# Patient Record
Sex: FEMALE | Race: OTHER | Employment: PART TIME | URBAN - METROPOLITAN AREA
[De-identification: names, ages, dates, MRNs, and addresses within clinical notes are randomized per-mention and may not be internally consistent; named-entity substitution may affect disease eponyms.]

---

## 2012-06-21 LAB — COLONOSCOPY, EXTERNAL: NORMAL

## 2017-05-13 ENCOUNTER — HOSPITAL ENCOUNTER (OUTPATIENT)
Dept: MAMMOGRAPHY | Age: 62
Discharge: HOME | End: 2017-05-13

## 2017-05-13 ENCOUNTER — HOSPITAL ENCOUNTER (OUTPATIENT)
Dept: LAB | Age: 62
Discharge: HOME | End: 2017-05-13

## 2017-05-13 ENCOUNTER — OFFICE VISIT (OUTPATIENT)
Dept: FAMILY PLANNING/WOMEN'S HEALTH CLINIC | Age: 62
End: 2017-05-13

## 2017-05-13 VITALS — DIASTOLIC BLOOD PRESSURE: 93 MMHG | SYSTOLIC BLOOD PRESSURE: 142 MMHG

## 2017-05-13 DIAGNOSIS — Z01.419 WELL WOMAN EXAM WITH ROUTINE GYNECOLOGICAL EXAM: Primary | ICD-10-CM

## 2017-05-13 DIAGNOSIS — Z01.419 WELL WOMAN EXAM WITH ROUTINE GYNECOLOGICAL EXAM: ICD-10-CM

## 2017-05-13 PROCEDURE — 88175 CYTOPATH C/V AUTO FLUID REDO: CPT | Performed by: NURSE PRACTITIONER

## 2017-05-13 PROCEDURE — 77067 SCR MAMMO BI INCL CAD: CPT

## 2017-05-13 NOTE — PROGRESS NOTES
EVERY WOMANS LIFE HISTORY QUESTIONNAIRE       No Yes Comments   Has a doctor ever seen or felt anything wrong with your breast? [x]                                  []                                     Have you ever had a breast biopsy? [x]                                  []                                          When and where was last mammogram performed? 4/2015 with [facility]    Have you ever been told that there was a problem on your mammogram?   No Yes Comments   [x]                                  []                                       Do you have breast implants? No Yes Comments   [x]                                  []                                       When was your last Pap test performed? 12/2012     Have you ever had an abnormal Pap test?   No Yes Comments   [x]                                  []                                       Have you had a hysterectomy? No Yes Comments (why)   []                                  [x]                                  2003 (supracervical)     Have you ever been diagnosed with any type of Cancer   No Yes Comments (type,when,where,type of treatment   [x]                                  []                                          Has a family member been diagnosed with breast or ovarian cancer? No Yes Comments (which family members, and type   [x]                                  []                                       Did your mother take ROLDAN? No Yes Unknown   []                                  []                                  X     Do you have a history of HIV exposure? No Yes    [x]                                  []                                         Have you been through menopause?    No Yes Date of LMP   []                                  [x]                                  2003 (hysterectomy)     Are you taking hormone replacement therapy (HRT)     No Yes Comments   [x]                                  [] How many times have you been pregnant? 3        Number of live births ? 3  Are you experiencing any of the following? No Yes Comments   Nipple Discharge [x]                                  []                                     Breast Lump/Masses [x]                                  []                                     Breast Skin Changes [x]                                  []                                          No Yes Comments   Vaginal Discharge [x]                                  []                                     Abnormal/unusual vaginal bleeding [x]                                  []                                         Are you experiencing any other health problems?     HTN, Hypothyroidism---PCP is Dr. Ishmael Lomeli Tell card given to pt for Pap Test result

## 2017-05-13 NOTE — PROGRESS NOTES
[address] [facility]  2017  Shakeel Franklin, 1955  HPI:  64 y. o.F,  here for [facility]. Pt denies abnormal SBE's, performs intermittently; denies abnormal vaginal dc and pelvic pain. Menopause s/p supracervical hysterectomy. [address] [address] of   Family History   Problem Relation Age of Onset    Other Mother      uterine fibroids    Post-op Nausea/Vomiting Mother     High Cholesterol Father     Coronary Artery Disease Father      SHX of   Social History     Social History    Marital status: LEGALLY      Spouse name: N/A    Number of children: N/A    Years of education: N/A     Social History Main Topics    Smoking status: Never Smoker    Smokeless tobacco: Never Used    Alcohol use No    Drug use: No    Sexual activity: Yes     Partners: Male     Birth control/ protection: Surgical     Other Topics Concern    None     Social History Narrative     Past surgeries include  has a past surgical history that includes hysterectomy (); hernia repair; cholecystectomy (11); and colonoscopy (12). No LMP recorded. Patient has had a hysterectomy. Last pap: 2012  Last mammogram:   Physical Exam: See scanned document.   Vitals:    17 1419   BP: (!) 142/93     Cassi Salcido NP

## 2017-05-26 ENCOUNTER — HOSPITAL ENCOUNTER (OUTPATIENT)
Dept: MAMMOGRAPHY | Age: 62
Discharge: HOME | End: 2017-05-26
Attending: INTERNAL MEDICINE

## 2017-05-26 DIAGNOSIS — R92.8 ABNORMAL MAMMOGRAM OF RIGHT BREAST: ICD-10-CM

## 2017-05-26 PROCEDURE — 77065 DX MAMMO INCL CAD UNI: CPT

## 2017-07-03 DIAGNOSIS — I10 HYPERTENSION, ESSENTIAL: ICD-10-CM

## 2017-07-03 RX ORDER — METOPROLOL TARTRATE 50 MG/1
TABLET ORAL
Qty: 180 TAB | Refills: 0 | Status: SHIPPED | OUTPATIENT
Start: 2017-07-03 | End: 2017-09-25 | Stop reason: SDUPTHER

## 2017-07-03 RX ORDER — AMLODIPINE BESYLATE 5 MG/1
5 TABLET ORAL DAILY
Qty: 90 TAB | Refills: 0 | Status: SHIPPED | OUTPATIENT
Start: 2017-07-03 | End: 2017-09-19 | Stop reason: SDUPTHER

## 2017-09-19 ENCOUNTER — OFFICE VISIT (OUTPATIENT)
Dept: INTERNAL MEDICINE CLINIC | Age: 62
End: 2017-09-19

## 2017-09-19 VITALS
TEMPERATURE: 98.4 F | WEIGHT: 160 LBS | OXYGEN SATURATION: 97 % | SYSTOLIC BLOOD PRESSURE: 142 MMHG | RESPIRATION RATE: 14 BRPM | HEART RATE: 60 BPM | HEIGHT: 62 IN | BODY MASS INDEX: 29.44 KG/M2 | DIASTOLIC BLOOD PRESSURE: 84 MMHG

## 2017-09-19 DIAGNOSIS — E66.3 OVERWEIGHT: ICD-10-CM

## 2017-09-19 DIAGNOSIS — I10 HYPERTENSION, ESSENTIAL: ICD-10-CM

## 2017-09-19 DIAGNOSIS — R92.8 ABNORMAL MAMMOGRAM OF RIGHT BREAST: ICD-10-CM

## 2017-09-19 DIAGNOSIS — E03.4 HYPOTHYROIDISM DUE TO ACQUIRED ATROPHY OF THYROID: Primary | ICD-10-CM

## 2017-09-19 DIAGNOSIS — I47.1 ATRIAL TACHYCARDIA (HCC): ICD-10-CM

## 2017-09-19 DIAGNOSIS — K21.9 GASTROESOPHAGEAL REFLUX DISEASE WITHOUT ESOPHAGITIS: ICD-10-CM

## 2017-09-19 RX ORDER — ACETAMINOPHEN 325 MG/1
TABLET ORAL
COMMUNITY

## 2017-09-19 RX ORDER — AMLODIPINE BESYLATE 5 MG/1
5 TABLET ORAL DAILY
Qty: 90 TAB | Refills: 1 | Status: SHIPPED | OUTPATIENT
Start: 2017-09-19 | End: 2017-09-19 | Stop reason: DRUGHIGH

## 2017-09-19 RX ORDER — AMLODIPINE BESYLATE 10 MG/1
10 TABLET ORAL DAILY
Qty: 90 TAB | Refills: 1 | Status: SHIPPED | OUTPATIENT
Start: 2017-09-19 | End: 2018-04-27 | Stop reason: SDUPTHER

## 2017-09-19 RX ORDER — LEVOTHYROXINE SODIUM 112 UG/1
112 TABLET ORAL
Qty: 90 TAB | Refills: 1 | Status: SHIPPED | OUTPATIENT
Start: 2017-09-19 | End: 2018-02-06 | Stop reason: CLARIF

## 2017-09-19 NOTE — PROGRESS NOTES
Esperanza Grubbs is a 58 y.o. female who was seen in clinic today (9/19/2017). She is self pay. Assessment & Plan:  Diagnoses and all orders for this visit:    1. Hypothyroidism due to acquired atrophy of thyroid- well controlled, continue current treatment pending review of labs   -     UNITHROID 112 mcg tablet; Take 1 Tab by mouth Daily (before breakfast). -     TSH 3RD GENERATION    2. Hypertension, essential- borderline controlled, will increase meds from 5mg to 10mg   -     amLODIPine (NORVASC) 10 mg tablet; Take 1 Tab by mouth daily. 3. Atrial tachycardia ([address]- asymptomatic, continue to monitor     4. Gastroesophageal reflux disease without esophagitis- well controlled, diet controlled     5. Abnormal mammogram of right breast- reviewed imaging, will f/u in 6 months    6. Overweight- needs improvement, worsening, I have reviewed/discussed the above normal BMI with the patient. I have recommended the following interventions: encourage exercise and lifestyle education regarding diet. Follow-up Disposition:  Return in about 1 year (around 9/19/2018), or if symptoms worsen or fail to improve, for Regular follow up.       ----------------------------------------------------------------------    Subjective:  Kristi was seen today for Hypertension and Hypothyroidism    Endocrine Review  Patient is seen for followup of hypothyroidism. Since last visit: no changes. She reports medication compliance: all the time and is taking separate from all her other meds. She reports the following concerns/problems/med side effects: none. Lab review: no recent labs for review, we reviewed her previous labs. Cardiovascular Review  The patient has hypertension. Since last visit: weight is up ~10 lbs. She reports taking medications as instructed, no medication side effects noted, patient does not perform home BP monitoring.   Diet and Lifestyle: generally follows a low fat low cholesterol diet, generally follows a low sodium diet, no formal exercise but active during the day. Labs: reviewed and discussed with patient. Lab Results   Component Value Date/Time    Sodium 142 04/08/2016 11:39 AM    Potassium 4.2 04/08/2016 11:39 AM        ----------------------------------------------------------------------      Prior to Admission medications    Medication Sig Start Date End Date Taking? Authorizing Provider   acetaminophen (TYLENOL) 325 mg tablet Take  by mouth every four (4) hours as needed for Pain. Yes Historical Provider   metoprolol tartrate (LOPRESSOR) 50 mg tablet TAKE ONE TABLET BY MOUTH TWICE A DAY 7/3/17  Yes Irineo Vital MD   amLODIPine (NORVASC) 5 mg tablet Take 1 Tab by mouth daily. 7/3/17  Yes Irineo Vital MD   UNITHROID 112 mcg tablet Take 1 Tab by mouth Daily (before breakfast). 4/27/17  Yes Irineo Vital MD   ibuprofen (ADVIL) 200 mg tablet Take 200 mg by mouth two (2) times daily as needed for Pain. Yes Historical Provider   aspirin 81 mg tablet Take 1 Tab by mouth daily. 11/18/11  Yes Irineo Vital MD   CALCIUM CARBONATE (CALCIUM 600 PO) Take 1 Tab by mouth two (2) times a day. Yes Historical Provider   MULTIVITAMIN PO Take 1 Tab by mouth daily. 5/31/11  Yes Historical Provider          Allergies   Allergen Reactions    Lisinopril Cough    Simvastatin Myalgia           Review of Systems   Constitutional: Negative for malaise/fatigue and weight loss. Respiratory: Negative for cough and shortness of breath. Cardiovascular: Negative for chest pain, palpitations and leg swelling. Gastrointestinal: Negative for abdominal pain, constipation, diarrhea, heartburn, nausea and vomiting. Genitourinary: Negative for frequency. Musculoskeletal: Negative for joint pain and myalgias. Skin: Negative for rash. Neurological: Negative for tingling, sensory change, focal weakness and headaches. Psychiatric/Behavioral: Negative for depression.  The patient is not nervous/anxious and does not have insomnia. Objective:   Physical Exam   Constitutional: No distress. Eyes: Conjunctivae are normal. No scleral icterus. Neck: No thyromegaly present. Cardiovascular: Regular rhythm and normal heart sounds. No murmur heard. Pulmonary/Chest: Effort normal and breath sounds normal. She has no wheezes. She has no rales. Abdominal: Bowel sounds are normal. She exhibits no mass. There is no hepatosplenomegaly. There is no tenderness. Musculoskeletal: She exhibits no edema. Psychiatric: She has a normal mood and affect. Her behavior is normal.         Visit Vitals    /84    Pulse 60    Temp 98.4 °F (36.9 °C) (Oral)    Resp 14    Ht 5' 2\" (1.575 m)    Wt 160 lb (72.6 kg)    SpO2 97%    BMI 29.26 kg/m2         Disclaimer:  Advised her to call back or return to office if symptoms worsen/change/persist.  Discussed expected course/resolution/complications of diagnosis in detail with patient. Medication risks/benefits/costs/interactions/alternatives discussed with patient. She was given an after visit summary which includes diagnoses, current medications, & vitals. She expressed understanding with the diagnosis and plan.         Brayden Hardy MD

## 2017-09-19 NOTE — MR AVS SNAPSHOT
Visit Information Date & Time Provider Department Dept. Phone Encounter #  
 9/19/2017  4:45 PM Ariadna Landrum, [address] [facility] Internal Medicine [phone] [id number] Follow-up Instructions Return in about 1 year (around 9/19/2018), or if symptoms worsen or fail to improve, for Regular follow up. Upcoming Health Maintenance Date Due ZOSTER VACCINE AGE 60> 6/29/2015 INFLUENZA AGE 9 TO ADULT 8/1/2017 BREAST CANCER SCRN MAMMOGRAM 5/26/2019 DTaP/Tdap/Td series (2 - Td) 10/7/2020 PAP AKA CERVICAL CYTOLOGY 5/13/2022 COLONOSCOPY 6/21/2022 Allergies as of 9/19/2017  Review Complete On: 9/19/2017 By: Ariadna Landrum MD  
  
 Severity Noted Reaction Type Reactions Lisinopril  10/28/2016    Cough Simvastatin  06/01/2012   Side Effect Myalgia Current Immunizations  Reviewed on 11/6/2015 Name Date Influenza Vaccine 10/28/2013 Influenza Vaccine (Quad) PF 10/28/2016, 11/6/2015 Influenza Vaccine Split 11/18/2011, 11/12/2010 Influenza Vaccine Whole 10/1/2009 TDAP Vaccine 10/7/2010 Not reviewed this visit You Were Diagnosed With   
  
 Codes Comments Hypothyroidism due to acquired atrophy of thyroid    -  Primary ICD-10-CM: E03.4 ICD-9-CM: 244.8, 246.8 Hypertension, essential     ICD-10-CM: I10 
ICD-9-CM: 401.9 Atrial tachycardia (HCC)     ICD-10-CM: I47.1 ICD-9-CM: 427.89 Gastroesophageal reflux disease without esophagitis     ICD-10-CM: K21.9 ICD-9-CM: 530.81 Abnormal mammogram of right breast     ICD-10-CM: R92.8 ICD-9-CM: 793.80 Overweight     ICD-10-CM: M74.5 ICD-9-CM: 278.02 Vitals BP Pulse Temp Resp Height(growth percentile) Weight(growth percentile) 142/84 60 98.4 °F (36.9 °C) (Oral) 14 5' 2\" (1.575 m) 160 lb (72.6 kg) SpO2 BMI OB Status Smoking Status 97% 29.26 kg/m2 Hysterectomy Never Smoker BMI and BSA Data Body Mass Index Body Surface Area 29.26 kg/m 2 1.78 m 2 Preferred Pharmacy Pharmacy Name Phone [address] [facility] [phone] [phone] Your Updated Medication List  
  
   
This list is accurate as of: 9/19/17  5:00 PM.  Always use your most recent med list. ADVIL 200 mg tablet Generic drug:  ibuprofen Take 200 mg by mouth two (2) times daily as needed for Pain. amLODIPine 10 mg tablet Commonly known as:  Jacquetta Couch Take 1 Tab by mouth daily. aspirin 81 mg tablet Take 1 Tab by mouth daily. CALCIUM 600 PO Take 1 Tab by mouth two (2) times a day. metoprolol tartrate 50 mg tablet Commonly known as:  LOPRESSOR  
TAKE ONE TABLET BY MOUTH TWICE A DAY MULTIVITAMIN PO Take 1 Tab by mouth daily. TYLENOL 325 mg tablet Generic drug:  acetaminophen Take  by mouth every four (4) hours as needed for Pain. UNITHROID 112 mcg tablet Generic drug:  levothyroxine Take 1 Tab by mouth Daily (before breakfast). Prescriptions Sent to Pharmacy Refills UNITHROID 112 mcg tablet 1 Sig: Take 1 Tab by mouth Daily (before breakfast). Class: Normal  
 Pharmacy: [facility] [place] [address] Ph #: [phone] Route: Oral  
 amLODIPine (NORVASC) 10 mg tablet 1 Sig: Take 1 Tab by mouth daily. Class: Normal  
 Pharmacy: [facility] [place] [address] Ph #: [phone] Route: Oral  
  
We Performed the Following TSH 3RD GENERATION [33460 CPT(R)] Follow-up Instructions Return in about 1 year (around 9/19/2018), or if symptoms worsen or fail to improve, for Regular follow up. To-Do List   
 11/30/2017 8:30 AM  
  Appointment with [facility] at [facility] ([phone]) Shower or bathe using soap and water. Do not use deodorant, powder, perfumes, or lotion the day of your exam.  If your prior mammograms were not performed at [facility] 6 please bring films with you or forward prior images 2 days before your procedure. If patient is not a callback diagnostic, the patient must have an order/script from the physician for the diagnostic. Please bring it on the day of the mammogram or have it faxed to the department. [facility]  [phone] [facility] [address] [place]  [phone] [facility] [phone] [facility] [address] [facility] [phone] [facility] [phone] Introducing [facility] & HEALTH SERVICES! Zoraida Diaz introduces [employer] patient portal. Now you can access parts of your medical record, email your doctor's office, and request medication refills online. 1. In your internet browser, go to [email or web address] 2. Click on the First Time User? Click Here link in the Sign In box. You will see the New Member Sign Up page. 3. Enter your [employer] Access Code exactly as it appears below. You will not need to use this code after youve completed the sign-up process. If you do not sign up before the expiration date, you must request a new code. · [employer] Access Code: [email or web address] Expires: 12/18/2017  5:00 PM 
 
4. Enter the last four digits of your Social Security Number (xxxx) and Date of Birth (mm/dd/yyyy) as indicated and click Submit. You will be taken to the next sign-up page. 5. Create a [employer] ID. This will be your [employer] login ID and cannot be changed, so think of one that is secure and easy to remember. 6. Create a [employer] password. You can change your password at any time. 7. Enter your Password Reset Question and Answer. This can be used at a later time if you forget your password. 8. Enter your e-mail address. You will receive e-mail notification when new information is available in [address] 9. Click Sign Up.  You can now view and download portions of your medical record. 10. Click the Download Summary menu link to download a portable copy of your medical information. If you have questions, please visit the Frequently Asked Questions section of the [employer] website. Remember, [employer] is NOT to be used for urgent needs. For medical emergencies, dial 911. Now available from your iPhone and Android! Please provide this summary of care documentation to your next provider. Your primary care clinician is listed as Jossie Kumar. If you have any questions after today's visit, please call [phone].

## 2017-09-25 RX ORDER — METOPROLOL TARTRATE 50 MG/1
TABLET ORAL
Qty: 180 TAB | Refills: 1 | Status: SHIPPED | OUTPATIENT
Start: 2017-09-25 | End: 2018-07-27 | Stop reason: SDUPTHER

## 2017-09-30 LAB — TSH SERPL DL<=0.005 MIU/L-ACNC: 0.47 UIU/ML (ref 0.45–4.5)

## 2017-11-30 ENCOUNTER — HOSPITAL ENCOUNTER (OUTPATIENT)
Dept: MAMMOGRAPHY | Age: 62
Discharge: HOME | End: 2017-11-30
Attending: NURSE PRACTITIONER

## 2017-11-30 DIAGNOSIS — R92.8 ABNORMAL MAMMOGRAM: ICD-10-CM

## 2017-11-30 PROCEDURE — 77065 DX MAMMO INCL CAD UNI: CPT

## 2017-12-20 ENCOUNTER — TELEPHONE (OUTPATIENT)
Dept: INTERNAL MEDICINE CLINIC | Age: 62
End: 2017-12-20

## 2017-12-20 NOTE — TELEPHONE ENCOUNTER
Patient called to ask about the status of her refill request for metoprolol tartrate (LOPRESSOR) 50 mg tablets. Will that be done soon?

## 2018-02-05 ENCOUNTER — TELEPHONE (OUTPATIENT)
Dept: INTERNAL MEDICINE CLINIC | Age: 63
End: 2018-02-05

## 2018-02-05 NOTE — TELEPHONE ENCOUNTER
Pt insurance is not covering her Unithroid it is  Going to cost  230.00.  She is out of the medication and needs help with getting a lower price script

## 2018-02-06 ENCOUNTER — TELEPHONE (OUTPATIENT)
Dept: INTERNAL MEDICINE CLINIC | Age: 63
End: 2018-02-06

## 2018-02-06 RX ORDER — LEVOTHYROXINE SODIUM 112 UG/1
112 TABLET ORAL
Qty: 90 TAB | Refills: 0 | Status: SHIPPED | OUTPATIENT
Start: 2018-02-06 | End: 2018-04-27 | Stop reason: SDUPTHER

## 2018-02-06 NOTE — TELEPHONE ENCOUNTER
Called pt and notified her that medication can be changed to Levothyroxine 112 mcg and would be $15.00 at [facility]. Pt is fine with that and medication has been sent to the [facility].

## 2018-02-06 NOTE — TELEPHONE ENCOUNTER
Can change to levothyroixin 112mcg, 1 tab daily, #90, RF 0. Will need labs in 2-3 months to verify stability. Find out where she wants it sent to.   Per George is $4 at [facility] to $15 at [facility].

## 2018-04-27 DIAGNOSIS — I10 HYPERTENSION, ESSENTIAL: ICD-10-CM

## 2018-04-27 RX ORDER — AMLODIPINE BESYLATE 10 MG/1
TABLET ORAL
Qty: 90 TAB | Refills: 1 | Status: SHIPPED | OUTPATIENT
Start: 2018-04-27 | End: 2018-07-27 | Stop reason: ALTCHOICE

## 2018-06-09 ENCOUNTER — OFFICE VISIT (OUTPATIENT)
Dept: FAMILY PLANNING/WOMEN'S HEALTH CLINIC | Age: 63
End: 2018-06-09

## 2018-06-09 ENCOUNTER — HOSPITAL ENCOUNTER (OUTPATIENT)
Dept: MAMMOGRAPHY | Age: 63
Discharge: HOME | End: 2018-06-09

## 2018-06-09 VITALS — SYSTOLIC BLOOD PRESSURE: 168 MMHG | DIASTOLIC BLOOD PRESSURE: 89 MMHG

## 2018-06-09 DIAGNOSIS — Z12.31 SCREENING MAMMOGRAM, ENCOUNTER FOR: ICD-10-CM

## 2018-06-09 DIAGNOSIS — Z12.31 SCREENING MAMMOGRAM, ENCOUNTER FOR: Primary | ICD-10-CM

## 2018-06-09 NOTE — PROGRESS NOTES
EVERY WOMANS LIFE HISTORY QUESTIONNAIRE       No Yes Comments   Has a doctor ever seen or felt anything wrong with your breast? [x]                                  []                                     Have you ever had a breast biopsy? [x]                                  []                                          When and where was last mammogram performed? 2017, [facility]    Have you ever been told that there was a problem on your mammogram?   No Yes Comments   []                                  [x]                                  Calcifications, benign     Do you have breast implants? No Yes Comments   [x]                                  []                                       When was your last Pap test performed? 5/2017,[facility]    Have you ever had an abnormal Pap test?   No Yes Comments   [x]                                  []                                       Have you had a hysterectomy? No Yes Comments (why)   []                                  [x]                                   Fibroids, 2003     Have you ever been diagnosed with any type of Cancer   No Yes Comments (type,when,where,type of treatment   [x]                                  []                                          Has a family member been diagnosed with breast or ovarian cancer? No Yes Comments (which family members, and type   [x]                                  []                                       Did your mother take ROLDAN? No Yes Unknown   []                                  []                                  unknown     Do you have a history of HIV exposure? No Yes    [x]                                  []                                         Have you been through menopause?    No Yes Date of LMP   []                                  [x]                                  2003     Are you taking hormone replacement therapy (HRT)     No Yes Comments   [x]                                  [] How many times have you been pregnant? 3    Number of live births ? 3    Are you experiencing any of the following? No Yes Comments   Nipple Discharge [x]                                  []                                     Breast Lump/Masses [x]                                  []                                     Breast Skin Changes [x]                                  []                                          No Yes Comments   Vaginal Discharge [x]                                  []                                     Abnormal/unusual vaginal bleeding [x]                                  []                                         Are you experiencing any other health problems?  Hypothyroid, hbp

## 2018-06-09 NOTE — PROGRESS NOTES
HISTORY OF PRESENT ILLNESS  Kristi Wilson is a 58 y.o. female. HPI   60yoF,  here for EWL exam. Ms. Shahrzad Wilson denies abnormal SBE's, performs every month. She reports a H/O breast calcifications in the right breast. She denies abnormal vaginal discharge and bloating; no pelvic pain. H/O supra-cervical hysterectomy in  for fibroids, last Pap in 2017. She did not take her 2 BP meds this morning and is aware her BP is elevated. She denies symptoms associated with it. Colonoscopy up to date with the last one 3 years ago that was negative. Nonsmoker. Review of Systems   Constitutional: Negative for chills, malaise/fatigue and weight loss. Eyes: Negative for blurred vision. Gastrointestinal: Negative for abdominal pain, blood in stool, constipation, diarrhea, nausea and vomiting. Neurological: Negative for dizziness and headaches. Physical Exam   Constitutional: She is oriented to person, place, and time. She appears well-developed and well-nourished. Pulmonary/Chest: Right breast exhibits no inverted nipple, no mass, no nipple discharge, no skin change and no tenderness. Left breast exhibits no inverted nipple, no mass, no nipple discharge, no skin change and no tenderness. Breasts are symmetrical.   Genitourinary: Rectum normal and vagina normal. Rectal exam shows no mass, no tenderness and guaiac negative stool. Pelvic exam was performed with patient supine. There is no rash, tenderness or lesion on the right labia. There is no rash, tenderness or lesion on the left labia. Cervix exhibits no motion tenderness, no discharge and no friability. No erythema, tenderness or bleeding in the vagina. No foreign body in the vagina. No signs of injury around the vagina. No vaginal discharge found. Lymphadenopathy:     She has no cervical adenopathy. She has no axillary adenopathy. Right: No inguinal and no supraclavicular adenopathy present.         Left: No inguinal and no supraclavicular adenopathy present. Neurological: She is alert and oriented to person, place, and time. Skin: Skin is warm and dry. Psychiatric: She has a normal mood and affect. Her behavior is normal. Thought content normal.   Nursing note and vitals reviewed. ASSESSMENT and PLAN  1. EWL exam  2. CBE benign  3. Diagnostic mammogram      -pt will return for this at [facility], 6/14      -Re: 6-month F/U for BI-RADS 3 (Nov. 2017)  4. H/O supra-cervical hysterectomy, 2003      -fibroids  5. H/O right breast calcifications  6.  BP GL's per Goran Khoury discussed      -reviewed BP medications in detail, dosage, time (pt has at home)

## 2018-06-14 ENCOUNTER — HOSPITAL ENCOUNTER (OUTPATIENT)
Dept: MAMMOGRAPHY | Age: 63
Discharge: HOME | End: 2018-06-14

## 2018-06-14 DIAGNOSIS — R92.1 BREAST CALCIFICATION SEEN ON MAMMOGRAM: ICD-10-CM

## 2018-06-14 PROCEDURE — 77066 DX MAMMO INCL CAD BI: CPT

## 2018-07-27 ENCOUNTER — OFFICE VISIT (OUTPATIENT)
Dept: INTERNAL MEDICINE CLINIC | Age: 63
End: 2018-07-27

## 2018-07-27 VITALS
WEIGHT: 159 LBS | HEART RATE: 62 BPM | RESPIRATION RATE: 16 BRPM | OXYGEN SATURATION: 98 % | BODY MASS INDEX: 29.26 KG/M2 | TEMPERATURE: 98.5 F | HEIGHT: 62 IN | SYSTOLIC BLOOD PRESSURE: 142 MMHG | DIASTOLIC BLOOD PRESSURE: 80 MMHG

## 2018-07-27 DIAGNOSIS — I47.1 ATRIAL TACHYCARDIA (HCC): ICD-10-CM

## 2018-07-27 DIAGNOSIS — E03.4 HYPOTHYROIDISM DUE TO ACQUIRED ATROPHY OF THYROID: ICD-10-CM

## 2018-07-27 DIAGNOSIS — E66.3 OVERWEIGHT (BMI 25.0-29.9): ICD-10-CM

## 2018-07-27 DIAGNOSIS — F43.9 STRESS AT HOME: ICD-10-CM

## 2018-07-27 DIAGNOSIS — I10 HYPERTENSION, ESSENTIAL: Primary | ICD-10-CM

## 2018-07-27 RX ORDER — METOPROLOL TARTRATE 50 MG/1
TABLET ORAL
Qty: 180 TAB | Refills: 1 | Status: SHIPPED | OUTPATIENT
Start: 2018-07-27 | End: 2019-07-11 | Stop reason: SDUPTHER

## 2018-07-27 RX ORDER — TERAZOSIN 5 MG/1
5 CAPSULE ORAL
Qty: 90 CAP | Refills: 1 | Status: SHIPPED | OUTPATIENT
Start: 2018-07-27 | End: 2019-06-11

## 2018-07-27 RX ORDER — LEVOTHYROXINE SODIUM 112 UG/1
112 TABLET ORAL
Qty: 90 TAB | Refills: 1 | Status: SHIPPED | OUTPATIENT
Start: 2018-07-27 | End: 2019-01-31 | Stop reason: SDUPTHER
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
## 2018-08-08 ENCOUNTER — DOCUMENTATION ONLY (OUTPATIENT)
Dept: INTERNAL MEDICINE CLINIC | Age: 63
End: 2018-08-08

## 2018-08-08 NOTE — PROGRESS NOTES
Call to patient regarding labs. Advised Dr. Sinai Camacho is still working on this for her and once done I will call her back. Also let her know Dr. Sinai Camacho is off next week so may be several weeks. . Patient verbalized understanding.

## 2018-08-28 ENCOUNTER — DOCUMENTATION ONLY (OUTPATIENT)
Dept: INTERNAL MEDICINE CLINIC | Age: 63
End: 2018-08-28

## 2018-08-28 DIAGNOSIS — E03.4 HYPOTHYROIDISM DUE TO ACQUIRED ATROPHY OF THYROID: Primary | ICD-10-CM

## 2018-08-28 NOTE — PROGRESS NOTES
Please call patient. I have spoken to [facility] rep Reena Borja). Charges have been removed, she does not owe LC anything. TSH ordered. Should verify cost but believe will be <$40 to get this done. Does not need to be fasting.

## 2018-08-28 NOTE — PROGRESS NOTES
Call to patient, advised per Dr. Balbir Sawyer she is good to have her labs done. Will be at the , she does not need to be fasting. She will come in Thursday to have it done.

## 2018-08-31 LAB — TSH SERPL DL<=0.005 MIU/L-ACNC: 0.56 UIU/ML (ref 0.45–4.5)

## 2018-09-14 ENCOUNTER — TELEPHONE (OUTPATIENT)
Dept: INTERNAL MEDICINE CLINIC | Age: 63
End: 2018-09-14

## 2018-09-21 ENCOUNTER — TELEPHONE (OUTPATIENT)
Dept: INTERNAL MEDICINE CLINIC | Age: 63
End: 2018-09-21

## 2018-09-21 NOTE — TELEPHONE ENCOUNTER
Call to patient to offer support. Told her Dr. Kathie Loyola is following Kemal's hospital course and that he (Kemal) has a great team of provider's caring for him. Kristi put on speaker phone to include Mirna Castillo, family friend. Dr. Kathie Loyola is provider for entire family. Told Kristi if she or their sons need anything to please call me; and to schedule follow up with Dr. Kathie Loyola as needed and within the next few weeks. They are scheduled to meet with Kemal's doctor for family meeting today. She will let us know if she needs anything.

## 2018-09-21 NOTE — TELEPHONE ENCOUNTER
Please call patient.  admitted to [facility] ICU. I have been following admission and aware of his poor prognosis. I agree with everything that is going on in the hospital.  He has a great team of providers taking care of him. I just want to provide my support to her and her children (they are all patients of mine). If she needs anything should come into see me. Should definitely follow up with me in the next 2-3 weeks.

## 2018-11-01 ENCOUNTER — OFFICE VISIT (OUTPATIENT)
Dept: INTERNAL MEDICINE CLINIC | Age: 63
End: 2018-11-01

## 2018-11-01 VITALS
SYSTOLIC BLOOD PRESSURE: 168 MMHG | WEIGHT: 160 LBS | HEART RATE: 68 BPM | DIASTOLIC BLOOD PRESSURE: 90 MMHG | HEIGHT: 62 IN | OXYGEN SATURATION: 98 % | TEMPERATURE: 98.2 F | RESPIRATION RATE: 16 BRPM | BODY MASS INDEX: 29.44 KG/M2

## 2018-11-01 DIAGNOSIS — F43.21 GRIEF: Primary | ICD-10-CM

## 2018-11-01 DIAGNOSIS — Z23 ENCOUNTER FOR IMMUNIZATION: ICD-10-CM

## 2018-11-01 DIAGNOSIS — I10 HYPERTENSION, ESSENTIAL: ICD-10-CM

## 2018-11-01 RX ORDER — AMLODIPINE BESYLATE 10 MG/1
10 TABLET ORAL DAILY
Qty: 90 TAB | Refills: 1 | Status: SHIPPED | OUTPATIENT
Start: 2018-11-01 | End: 2019-06-11 | Stop reason: SDUPTHER

## 2018-11-01 RX ORDER — AMLODIPINE BESYLATE 10 MG/1
TABLET ORAL DAILY
COMMUNITY
End: 2018-11-01 | Stop reason: SDUPTHER

## 2018-11-01 NOTE — PROGRESS NOTES
Follow up. Seeing a shadow from her right eye. Started Amlodipine in place of Terasozin. Asking if okay and can she stay on Amlodipine. Gwen Arias is a 61 y.o. female  who present for routine immunizations. she  denies any symptoms , reactions or allergies that would exclude them from being immunized today. Risks and adverse reactions were discussed and the VIS was given to them. All questions were addressed. she was observed for 5 min post injection. There were no reactions observed.     Levi Samuels RN

## 2018-11-01 NOTE — PATIENT INSTRUCTIONS
Grief (Actual/Anticipated): Care Instructions  Your Care Instructions    Grief is your emotional reaction to a major loss. The words \"sorrow\" and \"heartache\" often are used to describe feelings of grief. You feel grief when you lose a beloved person, pet, place, or thing. It is also natural to feel grief when you lose a valued way of life, such as a job, marriage, or good health. You may begin to grieve before a loss occurs. You may grieve for a loved one who is sick and dying. Children and adults often feel the pain of loss before a big move or divorce. This type of grief helps you get ready for a loss. Grief is different for each person. There is no \"normal\" or \"expected\" period of time for grieving. Some people adjust to their loss within a couple of months. Others may take 2 years or longer, especially if their lives were changed a lot or if the loss was sudden and shocking. Grieving can cause problems such as headaches, loss of appetite, and trouble with thinking or sleeping. You may withdraw from friends and family and behave in ways that are unusual for you. Grief may cause you to question your beliefs and views about life. Grief is natural and does not require medical treatment. But if you have trouble sleeping, it may help to take sleeping pills for a short time. It may help to talk with people who have been through or are going through similar losses. You may also want to talk to a counselor about your feelings. Talking about your loss, sharing your cares and concerns, and getting support from others are important parts of healthy grieving. Follow-up care is a key part of your treatment and safety. Be sure to make and go to all appointments, and call your doctor if you are having problems. It's also a good idea to know your test results and keep a list of the medicines you take. How can you care for yourself at home? · Get enough sleep. Your mind helps make sense of your life while you sleep. Missing sleep can lead to illness and make it harder for you to deal with your grief. · Eat healthy foods. Try to avoid eating only foods that give you comfort. Ask someone to join you for a meal if you do not like eating alone. Consider taking a multivitamin every day. · Get some exercise every day. Even a walk can help you deal with your grief. Other exercises, such as yoga, can also help you manage stress. · Comfort yourself. Take time to look at photos or use special items that make you feel better. · Stay involved in your life. Do not withdraw from the activities you enjoy. People you know at work, [employer], clubs, or other groups can help you get through your period of grief. · Think about joining a support group to help you deal with your grief. There are many support groups to help people recover from grief. When should you call for help? Call 911 anytime you think you may need emergency care. For example, call if:    · You feel you cannot stop from hurting yourself or someone else.    Watch closely for changes in your health, and be sure to contact your doctor if:    · You think you may be depressed.     · You do not get better as expected. Where can you learn more? Go to http://kelli-lazaro.info/. Enter H249 in the search box to learn more about \"Grief (Actual/Anticipated): Care Instructions. \"  Current as of: April 19, 2018  Content Version: 11.8  © 0145-6298 [employer]. Care instructions adapted under license by [employer] (which disclaims liability or warranty for this information). If you have questions about a medical condition or this instruction, always ask your healthcare professional. [address] any warranty or liability for your use of this information.
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
## 2018-11-01 NOTE — LETTER
11/2/2018 7:29 AM 
 
Ms. Kristi Zacarias [address] [id number] Dear Marcos Staley: 
 
Please find your most recent results below. Resulted Orders METABOLIC PANEL, COMPREHENSIVE Result Value Ref Range Glucose 97 65 - 99 mg/dL BUN 10 8 - 27 mg/dL Creatinine 0.61 0.57 - 1.00 mg/dL GFR est non-AA 97 >59 mL/min/1.73 GFR est  >59 mL/min/1.73  
 BUN/Creatinine ratio 16 12 - 28 Sodium 137 134 - 144 mmol/L Potassium 4.0 3.5 - 5.2 mmol/L Chloride 99 96 - 106 mmol/L  
 CO2 26 20 - 29 mmol/L Calcium 9.6 8.7 - 10.3 mg/dL Protein, total 7.7 6.0 - 8.5 g/dL Albumin 4.6 3.6 - 4.8 g/dL GLOBULIN, TOTAL 3.1 1.5 - 4.5 g/dL A-G Ratio 1.5 1.2 - 2.2 Bilirubin, total 0.4 0.0 - 1.2 mg/dL Alk. phosphatase 105 39 - 117 IU/L  
 AST (SGOT) 20 0 - 40 IU/L  
 ALT (SGPT) 20 0 - 32 IU/L  
LIPID PANEL Cholesterol, total 235 (H) 100 - 199 mg/dL Triglyceride 86 0 - 149 mg/dL HDL Cholesterol 67 >39 mg/dL VLDL, calculated 17 5 - 40 mg/dL LDL, calculated 151 (H) 0 - 99 mg/dL RECOMMENDATIONS: 
All labs are stable or at goal except for your cholesterol. It is slightly high. At this time no changes. We will likely need to start a cholesterol medication in the future though. Just keep an eye on the diet: limit the fried, fatty, and fast foods. Please call me if you have any questions: [phone] Sincerely, Cheng Burnette MD

## 2018-11-02 LAB
ALBUMIN SERPL-MCNC: 4.6 G/DL (ref 3.6–4.8)
ALBUMIN/GLOB SERPL: 1.5 {RATIO} (ref 1.2–2.2)
ALP SERPL-CCNC: 105 IU/L (ref 39–117)
ALT SERPL-CCNC: 20 IU/L (ref 0–32)
AST SERPL-CCNC: 20 IU/L (ref 0–40)
BILIRUB SERPL-MCNC: 0.4 MG/DL (ref 0–1.2)
BUN SERPL-MCNC: 10 MG/DL (ref 8–27)
BUN/CREAT SERPL: 16 (ref 12–28)
CALCIUM SERPL-MCNC: 9.6 MG/DL (ref 8.7–10.3)
CHLORIDE SERPL-SCNC: 99 MMOL/L (ref 96–106)
CHOLEST SERPL-MCNC: 235 MG/DL (ref 100–199)
CO2 SERPL-SCNC: 26 MMOL/L (ref 20–29)
CREAT SERPL-MCNC: 0.61 MG/DL (ref 0.57–1)
GLOBULIN SER CALC-MCNC: 3.1 G/DL (ref 1.5–4.5)
GLUCOSE SERPL-MCNC: 97 MG/DL (ref 65–99)
HDLC SERPL-MCNC: 67 MG/DL
LDLC SERPL CALC-MCNC: 151 MG/DL (ref 0–99)
POTASSIUM SERPL-SCNC: 4 MMOL/L (ref 3.5–5.2)
PROT SERPL-MCNC: 7.7 G/DL (ref 6–8.5)
SODIUM SERPL-SCNC: 137 MMOL/L (ref 134–144)
TRIGL SERPL-MCNC: 86 MG/DL (ref 0–149)
VLDLC SERPL CALC-MCNC: 17 MG/DL (ref 5–40)

## 2018-11-02 NOTE — PROGRESS NOTES
Letter sent to patient. All labs are stable or at goal except for worsening lipids. 10 yr CVD risk is 7.4%. Will defer statin at this time, but will need to address & likely start at next visit.

## 2018-11-09 ENCOUNTER — TELEPHONE (OUTPATIENT)
Dept: INTERNAL MEDICINE CLINIC | Age: 63
End: 2018-11-09

## 2018-12-07 ENCOUNTER — OFFICE VISIT (OUTPATIENT)
Dept: INTERNAL MEDICINE CLINIC | Age: 63
End: 2018-12-07

## 2018-12-07 VITALS — HEART RATE: 67 BPM | SYSTOLIC BLOOD PRESSURE: 156 MMHG | DIASTOLIC BLOOD PRESSURE: 95 MMHG

## 2018-12-07 DIAGNOSIS — I10 HYPERTENSION, ESSENTIAL: Primary | ICD-10-CM

## 2018-12-07 NOTE — PROGRESS NOTES
For BP check. Taking medication as prescribed. Advised would call back later with Dr. Raiza Patel recommendations.

## 2018-12-08 NOTE — PROGRESS NOTES
BP not at goal.  Chart reviewed. Has allergy to lisinopril. Did not tolerate alpha-blocker. Is on max dose of amlodipine and on beta blocker already. Will add in HCTZ 25mg. 1 tab PO daily, #90, RF 0. She was on HCTZ in the past ('11).

## 2018-12-12 ENCOUNTER — TELEPHONE (OUTPATIENT)
Dept: INTERNAL MEDICINE CLINIC | Age: 63
End: 2018-12-12

## 2018-12-12 DIAGNOSIS — I10 HYPERTENSION, ESSENTIAL: Primary | ICD-10-CM

## 2018-12-12 RX ORDER — HYDROCHLOROTHIAZIDE 25 MG/1
25 TABLET ORAL DAILY
Qty: 90 TAB | Refills: 0 | Status: SHIPPED | OUTPATIENT
Start: 2018-12-12 | End: 2019-07-12 | Stop reason: SDUPTHER

## 2019-01-31 DIAGNOSIS — E03.4 HYPOTHYROIDISM DUE TO ACQUIRED ATROPHY OF THYROID: ICD-10-CM

## 2019-01-31 RX ORDER — LEVOTHYROXINE SODIUM 112 UG/1
TABLET ORAL
Qty: 90 TAB | Refills: 1 | Status: SHIPPED | OUTPATIENT
Start: 2019-01-31 | End: 2019-04-26 | Stop reason: SDUPTHER

## 2019-04-26 DIAGNOSIS — E03.4 HYPOTHYROIDISM DUE TO ACQUIRED ATROPHY OF THYROID: ICD-10-CM

## 2019-04-28 RX ORDER — LEVOTHYROXINE SODIUM 112 UG/1
TABLET ORAL
Qty: 90 TAB | Refills: 1 | Status: SHIPPED | OUTPATIENT
Start: 2019-04-28 | End: 2019-12-09 | Stop reason: SDUPTHER

## 2019-06-11 ENCOUNTER — OFFICE VISIT (OUTPATIENT)
Dept: INTERNAL MEDICINE CLINIC | Age: 64
End: 2019-06-11

## 2019-06-11 VITALS
DIASTOLIC BLOOD PRESSURE: 90 MMHG | RESPIRATION RATE: 16 BRPM | SYSTOLIC BLOOD PRESSURE: 162 MMHG | HEIGHT: 62 IN | WEIGHT: 157 LBS | TEMPERATURE: 98.1 F | OXYGEN SATURATION: 98 % | HEART RATE: 68 BPM | BODY MASS INDEX: 28.89 KG/M2

## 2019-06-11 DIAGNOSIS — E66.3 OVERWEIGHT (BMI 25.0-29.9): ICD-10-CM

## 2019-06-11 DIAGNOSIS — Z12.39 BREAST CANCER SCREENING: ICD-10-CM

## 2019-06-11 DIAGNOSIS — Z23 ENCOUNTER FOR IMMUNIZATION: ICD-10-CM

## 2019-06-11 DIAGNOSIS — I47.1 ATRIAL TACHYCARDIA (HCC): ICD-10-CM

## 2019-06-11 DIAGNOSIS — I10 HYPERTENSION, ESSENTIAL: Primary | ICD-10-CM

## 2019-06-11 DIAGNOSIS — E03.4 HYPOTHYROIDISM DUE TO ACQUIRED ATROPHY OF THYROID: ICD-10-CM

## 2019-06-11 RX ORDER — LANOLIN ALCOHOL/MO/W.PET/CERES
1000 CREAM (GRAM) TOPICAL EVERY OTHER DAY
COMMUNITY

## 2019-06-11 RX ORDER — AMLODIPINE BESYLATE 10 MG/1
10 TABLET ORAL DAILY
Qty: 90 TAB | Refills: 1 | Status: SHIPPED | OUTPATIENT
Start: 2019-06-11 | End: 2020-01-17

## 2019-06-11 NOTE — PROGRESS NOTES
Zandra Matute is a 61 y.o. female who was seen in clinic today (6/11/2019). Assessment & Plan:     Diagnoses and all orders for this visit:    1. Hypertension, essential- at goal at home, did not take BP medication today, will check labs, she will RTC in 3-4 wks for BP cuff calibration  -     amLODIPine (NORVASC) 10 mg tablet; Take 1 Tab by mouth daily.  -     METABOLIC PANEL, COMPREHENSIVE  -     CBC W/O DIFF    2. Atrial tachycardia ([address]- well controlled, rare symptoms, continue Beta Blockers  -     METABOLIC PANEL, COMPREHENSIVE  -     CBC W/O DIFF    3. Hypothyroidism due to acquired atrophy of thyroid- well controlled, continue current treatment pending review of labs  -     TSH 3RD GENERATION    4. Breast cancer screening- she is overdue, she will get set up through a free clinic in the community. 5. Overweight (BMI 25.0-29. 9)- stable, needs improvement, I have recommended the following interventions: encourage exercise and lifestyle education regarding diet. 6. Encounter for immunization- declined Shingles due to cost         Follow-up and Dispositions    · Return in about 1 month (around 7/11/2019) for Nurse visit for blood pressure check THEN 6 months with me. Subjective:   Kristi was seen today for Hypertension and Hypothyroidism    Cardiovascular Review  The patient has hypertension and AT. Since last visit: started on HCTZ. She reports taking medications as instructed, no medication side effects noted, home BP monitoring in range of 300-793'H systolic over 49'Q diastolic. Diet and Lifestyle: generally follows a low sodium diet, no formal exercise but active during the day. Labs: reviewed and discussed with patient. Endocrine Review  Patient is seen for followup of hypothyroidism. Since last visit: no changes. She reports medication compliance: all the time and is taking separate from all other meds.   She reports the following concerns/problems/med side effects: none.  Lab review: labs reviewed and discussed with patient. Brief Labs:     Lab Results   Component Value Date/Time    Sodium 137 11/01/2018 11:22 AM    Potassium 4.0 11/01/2018 11:22 AM    Creatinine 0.61 11/01/2018 11:22 AM    Cholesterol, total 235 11/01/2018 11:22 AM    HDL Cholesterol 67 11/01/2018 11:22 AM    LDL, calculated 151 11/01/2018 11:22 AM    Triglyceride 86 11/01/2018 11:22 AM    TSH 0.559 08/30/2018 04:00 PM          Prior to Admission medications    Medication Sig Start Date End Date Taking? Authorizing Provider   cyanocobalamin (VITAMIN B-12) 1,000 mcg tablet Take 1,000 mcg by mouth every other day. Yes Provider, Historical   levothyroxine (SYNTHROID) 112 mcg tablet TAKE ONE TABLET BY MOUTH DAILY 4/28/19  Yes Antwan Hoover MD   hydroCHLOROthiazide (HYDRODIURIL) 25 mg tablet Take 1 Tab by mouth daily. 12/12/18  Yes Antwan Hoover MD   amLODIPine (NORVASC) 10 mg tablet Take 1 Tab by mouth daily. 11/1/18  Yes Antwan Hoover MD   metoprolol tartrate (LOPRESSOR) 50 mg tablet TAKE ONE TABLET BY MOUTH TWICE A DAY 7/27/18  Yes Antwan Hoover MD   terazosin (HYTRIN) 5 mg capsule Take 1 Cap by mouth nightly. 7/27/18  Yes Antwan Hoover MD   acetaminophen (TYLENOL) 325 mg tablet Take  by mouth every four (4) hours as needed for Pain. Yes Provider, Historical   aspirin 81 mg tablet Take 1 Tab by mouth daily. 11/18/11  Yes Antwan Hoover MD   CALCIUM CARBONATE (CALCIUM 600 PO) Take 1 Tab by mouth two (2) times a day. Yes Provider, Historical   MULTIVITAMIN PO Take 1 Tab by mouth daily. 5/31/11  Yes Provider, Historical          Allergies   Allergen Reactions    Lisinopril Cough    Simvastatin Myalgia           Review of Systems   Constitutional: Negative for malaise/fatigue and weight loss. Respiratory: Negative for cough and shortness of breath. Cardiovascular: Positive for palpitations. Negative for chest pain and leg swelling. Gastrointestinal: Negative for abdominal pain, constipation, diarrhea, heartburn, nausea and vomiting. She reports having some abdominal bloating, changed around her diet- no meat, no milk products. Used [employer] for 2 weeks. Symptoms have not returned   Genitourinary: Negative for frequency. Musculoskeletal: Negative for joint pain and myalgias. Skin: Negative for rash. Neurological: Negative for tingling, sensory change, focal weakness and headaches. Psychiatric/Behavioral: Negative for depression. The patient is not nervous/anxious and does not have insomnia. Objective:   Physical Exam   Constitutional: No distress. Eyes: Conjunctivae are normal. No scleral icterus. Neck: No thyromegaly present. Cardiovascular: Regular rhythm and normal heart sounds. No murmur heard. Pulmonary/Chest: Effort normal and breath sounds normal. She has no wheezes. She has no rales. Abdominal: Bowel sounds are normal. She exhibits no mass. There is no hepatosplenomegaly. There is no tenderness. Musculoskeletal: She exhibits no edema. Psychiatric: She has a normal mood and affect. Her behavior is normal.         Visit Vitals  /90   Pulse 68   Temp 98.1 °F (36.7 °C) (Oral)   Resp 16   Ht 5' 2\" (1.575 m)   Wt 157 lb (71.2 kg)   LMP 01/01/2003   SpO2 98%   BMI 28.72 kg/m²         Disclaimer:  Advised her to call back or return to office if symptoms worsen/change/persist.  Discussed expected course/resolution/complications of diagnosis in detail with patient. Medication risks/benefits/costs/interactions/alternatives discussed with patient. She was given an after visit summary which includes diagnoses, current medications, & vitals. She expressed understanding with the diagnosis and plan. Aspects of this note may have been generated using voice recognition software. Despite editing, there may be some syntax errors.        Cyrus Rios MD

## 2019-06-12 LAB
ALBUMIN SERPL-MCNC: 4.2 G/DL (ref 3.6–4.8)
ALBUMIN/GLOB SERPL: 1.4 {RATIO} (ref 1.2–2.2)
ALP SERPL-CCNC: 102 IU/L (ref 39–117)
ALT SERPL-CCNC: 17 IU/L (ref 0–32)
AST SERPL-CCNC: 19 IU/L (ref 0–40)
BILIRUB SERPL-MCNC: 0.4 MG/DL (ref 0–1.2)
BUN SERPL-MCNC: 5 MG/DL (ref 8–27)
BUN/CREAT SERPL: 9 (ref 12–28)
CALCIUM SERPL-MCNC: 9.4 MG/DL (ref 8.7–10.3)
CHLORIDE SERPL-SCNC: 102 MMOL/L (ref 96–106)
CO2 SERPL-SCNC: 24 MMOL/L (ref 20–29)
CREAT SERPL-MCNC: 0.55 MG/DL (ref 0.57–1)
ERYTHROCYTE [DISTWIDTH] IN BLOOD BY AUTOMATED COUNT: 13.7 % (ref 12.3–15.4)
GLOBULIN SER CALC-MCNC: 2.9 G/DL (ref 1.5–4.5)
GLUCOSE SERPL-MCNC: 89 MG/DL (ref 65–99)
HCT VFR BLD AUTO: 39.7 % (ref 34–46.6)
HGB BLD-MCNC: 13.5 G/DL (ref 11.1–15.9)
MCH RBC QN AUTO: 31.6 PG (ref 26.6–33)
MCHC RBC AUTO-ENTMCNC: 34 G/DL (ref 31.5–35.7)
MCV RBC AUTO: 93 FL (ref 79–97)
PLATELET # BLD AUTO: 327 X10E3/UL (ref 150–450)
POTASSIUM SERPL-SCNC: 3.8 MMOL/L (ref 3.5–5.2)
PROT SERPL-MCNC: 7.1 G/DL (ref 6–8.5)
RBC # BLD AUTO: 4.27 X10E6/UL (ref 3.77–5.28)
SODIUM SERPL-SCNC: 140 MMOL/L (ref 134–144)
TSH SERPL DL<=0.005 MIU/L-ACNC: 0.37 UIU/ML (ref 0.45–4.5)
WBC # BLD AUTO: 6.1 X10E3/UL (ref 3.4–10.8)

## 2019-06-12 NOTE — PROGRESS NOTES
Spoke with patient and let her know that her labs were stable except for her thyroid. Patient repeated the directions for medication and understood.

## 2019-06-12 NOTE — PROGRESS NOTES
Please call patient. All labs are stable or at goal except for TSH just barely low. She has been running low normal last few years. Would change to 1 tab daily except 1/2 tab on Sunday.

## 2019-07-11 DIAGNOSIS — I10 HYPERTENSION, ESSENTIAL: ICD-10-CM

## 2019-07-11 DIAGNOSIS — I47.1 ATRIAL TACHYCARDIA (HCC): ICD-10-CM

## 2019-07-11 RX ORDER — METOPROLOL TARTRATE 50 MG/1
TABLET ORAL
Qty: 180 TAB | Refills: 1 | Status: SHIPPED | OUTPATIENT
Start: 2019-07-11 | End: 2020-04-24 | Stop reason: SDUPTHER

## 2019-07-12 ENCOUNTER — CLINICAL SUPPORT (OUTPATIENT)
Dept: INTERNAL MEDICINE CLINIC | Age: 64
End: 2019-07-12

## 2019-07-12 VITALS — OXYGEN SATURATION: 97 % | DIASTOLIC BLOOD PRESSURE: 88 MMHG | HEART RATE: 56 BPM | SYSTOLIC BLOOD PRESSURE: 144 MMHG

## 2019-07-12 DIAGNOSIS — I10 HYPERTENSION, ESSENTIAL: ICD-10-CM

## 2019-07-12 DIAGNOSIS — I10 HYPERTENSION, ESSENTIAL: Primary | ICD-10-CM

## 2019-07-12 RX ORDER — HYDROCHLOROTHIAZIDE 12.5 MG/1
12.5 TABLET ORAL DAILY
Qty: 90 TAB | Refills: 1 | Status: CANCELLED | OUTPATIENT
Start: 2019-07-12

## 2019-07-12 RX ORDER — HYDROCHLOROTHIAZIDE 12.5 MG/1
12.5 TABLET ORAL DAILY
Qty: 90 TAB | Refills: 1 | Status: SHIPPED | OUTPATIENT
Start: 2019-07-12 | End: 2019-12-16 | Stop reason: SDUPTHER

## 2019-07-12 NOTE — PROGRESS NOTES
Cuff matches up pretty good, maybe running slightly low. Would continue w/ HCTZ, but change to 1/2 tab and take daily. Can not take every other day, this is not sufficient.

## 2019-07-12 NOTE — PROGRESS NOTES
For BP check. Taking HCTZ every other day. Wants to know if she should continue HCTZ, changed to every other day because it made her dizzy. Machine cuff 137/83.

## 2019-07-12 NOTE — PROGRESS NOTES
Call to patient, advised of Dr. Cr Hurst' note and recommendations. Prescription already sent to pharmacy by Dr. Cr Hurst as HCTZ 12.5 mg, one tab daily which is 1/2 the dose of the previous prescription. She will try taking 12.5 mg daily and see how she tolerates.

## 2019-12-09 DIAGNOSIS — E03.4 HYPOTHYROIDISM DUE TO ACQUIRED ATROPHY OF THYROID: ICD-10-CM

## 2019-12-09 RX ORDER — LEVOTHYROXINE SODIUM 112 UG/1
TABLET ORAL
Qty: 90 TAB | Refills: 1 | Status: SHIPPED | OUTPATIENT
Start: 2019-12-09 | End: 2020-01-17

## 2019-12-16 DIAGNOSIS — I10 HYPERTENSION, ESSENTIAL: ICD-10-CM

## 2019-12-16 RX ORDER — HYDROCHLOROTHIAZIDE 12.5 MG/1
12.5 TABLET ORAL DAILY
Qty: 90 TAB | Refills: 0 | Status: SHIPPED | OUTPATIENT
Start: 2019-12-16 | End: 2020-01-17 | Stop reason: SDUPTHER

## 2020-01-17 ENCOUNTER — OFFICE VISIT (OUTPATIENT)
Dept: INTERNAL MEDICINE CLINIC | Age: 65
End: 2020-01-17

## 2020-01-17 VITALS
RESPIRATION RATE: 16 BRPM | SYSTOLIC BLOOD PRESSURE: 136 MMHG | OXYGEN SATURATION: 99 % | BODY MASS INDEX: 29.44 KG/M2 | HEART RATE: 68 BPM | DIASTOLIC BLOOD PRESSURE: 80 MMHG | TEMPERATURE: 97.6 F | HEIGHT: 62 IN | WEIGHT: 160 LBS

## 2020-01-17 DIAGNOSIS — M25.461 SWELLING OF KNEE JOINT, RIGHT: ICD-10-CM

## 2020-01-17 DIAGNOSIS — I10 HYPERTENSION, ESSENTIAL: Primary | ICD-10-CM

## 2020-01-17 DIAGNOSIS — K21.9 GASTROESOPHAGEAL REFLUX DISEASE WITHOUT ESOPHAGITIS: ICD-10-CM

## 2020-01-17 DIAGNOSIS — E03.4 HYPOTHYROIDISM DUE TO ACQUIRED ATROPHY OF THYROID: ICD-10-CM

## 2020-01-17 DIAGNOSIS — I47.1 ATRIAL TACHYCARDIA (HCC): ICD-10-CM

## 2020-01-17 RX ORDER — FAMOTIDINE 40 MG/1
40 TABLET, FILM COATED ORAL DAILY
Qty: 30 TAB | Refills: 0 | Status: SHIPPED | OUTPATIENT
Start: 2020-01-17 | End: 2021-01-12

## 2020-01-17 RX ORDER — HYDROCHLOROTHIAZIDE 12.5 MG/1
12.5 TABLET ORAL DAILY
Qty: 90 TAB | Refills: 0 | Status: SHIPPED | OUTPATIENT
Start: 2020-01-17 | End: 2020-07-30 | Stop reason: SDUPTHER

## 2020-01-17 RX ORDER — METOPROLOL TARTRATE 50 MG/1
TABLET ORAL
Qty: 180 TAB | Refills: 1 | Status: CANCELLED | OUTPATIENT
Start: 2020-01-17

## 2020-01-17 RX ORDER — LEVOTHYROXINE SODIUM 112 UG/1
112 TABLET ORAL
COMMUNITY
End: 2020-07-22

## 2020-01-17 NOTE — PATIENT INSTRUCTIONS
Tdap (Tetanus, Diphtheria, Pertussis) Vaccine: What You Need to Know  Why get vaccinated? Tetanus, diphtheria, and pertussis are very serious diseases. Tdap vaccine can protect us from these diseases. And Tdap vaccine given to pregnant women can protect  babies against pertussis. Tetanus (lockjaw) is rare in the [facility] today. It causes painful muscle tightening and stiffness, usually all over the body. · It can lead to tightening of muscles in the head and neck so you can't open your mouth, swallow, or sometimes even breathe. Tetanus kills about 1 out of 10 people who are infected even after receiving the best medical care. Diphtheria is also rare in the [place] today. It can cause a thick coating to form in the back of the throat. · It can lead to breathing problems, heart failure, paralysis, and death. Pertussis (whooping cough) causes severe coughing spells, which can cause difficulty breathing, vomiting, and disturbed sleep. · It can also lead to weight loss, incontinence, and rib fractures. Up to 2 in 100 adolescents and 5 in 100 adults with pertussis are hospitalized or have complications, which could include pneumonia or death. These diseases are caused by bacteria. Diphtheria and pertussis are spread from person to person through secretions from coughing or sneezing. Tetanus enters the body through cuts, scratches, or wounds. Before vaccines, as many as 200,000 cases of diphtheria, 200,000 cases of pertussis, and hundreds of cases of tetanus were reported in the [place] each year. Since vaccination began, reports of cases for tetanus and diphtheria have dropped by about 99% and for pertussis by about 80%. Tdap vaccine  The Tdap vaccine can protect adolescents and adults from tetanus, diphtheria, and pertussis. One dose of Tdap is routinely given at age 6 or 15. People who did not get Tdap at that age should get it as soon as possible.   Tdap is especially important for health care professionals and anyone having close contact with a baby younger than 12 months. Pregnant women should get a dose of Tdap during every pregnancy, to protect the  from pertussis. Infants are most at risk for severe, life-threatening complications from pertussis. Another vaccine, called Td, protects against tetanus and diphtheria, but not pertussis. A Td booster should be given every 10 years. Tdap may be given as one of these boosters if you have never gotten Tdap before. Tdap may also be given after a severe cut or burn to prevent tetanus infection. Your doctor or the person giving you the vaccine can give you more information. Tdap may safely be given at the same time as other vaccines. Some people should not get this vaccine  · A person who has ever had a life-threatening allergic reaction after a previous dose of any diphtheria-, tetanus-, or pertussis-containing vaccine, OR has a severe allergy to any part of this vaccine, should not get Tdap vaccine. Tell the person giving the vaccine about any severe allergies. · Anyone who had coma or long repeated seizures within 7 days after a childhood dose of DTP or DTaP, or a previous dose of Tdap, should not get Tdap, unless a cause other than the vaccine was found. They can still get Td. · Talk to your doctor if you:  ? Have seizures or another nervous system problem. ? Had severe pain or swelling after any vaccine containing diphtheria, tetanus, or pertussis. ? Ever had a condition called Guillain-Barré Syndrome (GBS). ? Aren't feeling well on the day the shot is scheduled. Risks  With any medicine, including vaccines, there is a chance of side effects. These are usually mild and go away on their own. Serious reactions are also possible but are rare. Most people who get Tdap vaccine do not have any problems with it.   Mild problems following Tdap  (Did not interfere with activities)  · Pain where the shot was given (about 3 in 4 adolescents or 2 in 3 adults)  · Redness or swelling where the shot was given (about 1 person in 5)  · Mild fever of at least 100.4°F (up to about 1 in 25 adolescents or 1 in 100 adults)  · Headache (about 3 or 4 people in 10)  · Tiredness (about 1 person in 3 or 4)  · Nausea, vomiting, diarrhea, stomachache (up to 1 in 4 adolescents or 1 in 10 adults)  · Chills, sore joints (about 1 person in 10)  · Body aches (about 1 person in 3 or 4)  · Rash, swollen glands (uncommon)  Moderate problems following Tdap  (Interfered with activities, but did not require medical attention)  · Pain where the shot was given (up to 1 in 5 or 6)  · Redness or swelling where the shot was given (up to about 1 in 16 adolescents or 1 in 12 adults)  · Fever over 102°F (about 1 in 100 adolescents or 1 in 250 adults)  · Headache (about 1 in 7 adolescents or 1 in 10 adults)  · Nausea, vomiting, diarrhea, stomachache (up to 1 to 3 people in 100)  · Swelling of the entire arm where the shot was given (up to about 1 in 500)  Severe problems following Tdap  (Unable to perform usual activities; required medical attention)  · Swelling, severe pain, bleeding and redness in the arm where the shot was given (rare)  Problems that could happen after any vaccine:  · People sometimes faint after a medical procedure, including vaccination. Sitting or lying down for about 15 minutes can help prevent fainting, and injuries caused by a fall. Tell your doctor if you feel dizzy or have vision changes or ringing in the ears. · Some people get severe pain in the shoulder and have difficulty moving the arm where a shot was given. This happens very rarely. · Any medication can cause a severe allergic reaction. Such reactions from a vaccine are very rare, estimated at fewer than 1 in a million doses, and would happen within a few minutes to a few hours after the vaccination.   As with any medicine, there is a very remote chance of a vaccine causing a serious injury or death. The safety of vaccines is always being monitored. For more information, visit: www.cdc.gov/vaccinesafety. What if there is a serious problem? What should I look for? · Look for anything that concerns you, such as signs of a severe allergic reaction, very high fever, or unusual behavior. Signs of a severe allergic reaction can include hives, swelling of the face and throat, difficulty breathing, a fast heartbeat, dizziness, and weakness. These would usually start a few minutes to a few hours after the vaccination. What should I do? · If you think it is a severe allergic reaction or other emergency that can't wait, call  or get the person to the nearest hospital. Otherwise, call your doctor. · Afterward, the reaction should be reported to the Vaccine Adverse Event Reporting System (VAERS). Your doctor might file this report, or you can do it yourself through the VAERS web site at www.vaers. [facility].gov, or by calling 3-290.513.5783. [employer] does not give medical advice. The National Vaccine Injury Compensation Program  The National Vaccine Injury Compensation Program (VICP) is a federal program that was created to compensate people who may have been injured by certain vaccines. Persons who believe they may have been injured by a vaccine can learn about the program and about filing a claim by calling 9-436.191.5639 or visiting the [facility] [employer] website at www.[facility].gov/vaccinecompensation. There is a time limit to file a claim for compensation. How can I learn more? · Ask your doctor. He or she can give you the vaccine package insert or suggest other sources of information. · Call your local or state health department. · Contact the Centers for Disease Control and Prevention (CDC):  ? Call 6-326.161.1020 (3-054-YKX-INFO) or  ? Visit CDC's website at www.cdc.gov/vaccines  Vaccine Information Statement (Interim)  Tdap Vaccine  (2/24/15)  [address] [id number]  [place] [employer] for Disease Control and Prevention  Many Vaccine Information Statements are available in Sinhala and other languages. See www.immunize.org/vis. Muchas hojas de información sobre vacunas están disponibles en español y en otros idiomas. Visite www.immunize.org/vis. Care instructions adapted under license by [employer] (which disclaims liability or warranty for this information). If you have questions about a medical condition or this instruction, always ask your healthcare professional. [address] any warranty or liability for your use of this information.

## 2020-01-17 NOTE — PROGRESS NOTES
Rose Rey is a 59 y.o. female who was seen in clinic today (1/17/2020). Assessment & Plan:   Diagnoses and all orders for this visit:    1. Hypertension, essential- borderline controlled, acceptable, no changed pending review of labs  -     LIPID PANEL  -     METABOLIC PANEL, COMPREHENSIVE  -     hydroCHLOROthiazide (HYDRODIURIL) 12.5 mg tablet; Take 1 Tab by mouth daily. 2. Atrial tachycardia ([address]- no recurrence, asymptomatic, continue BB    3. Hypothyroidism due to acquired atrophy of thyroid- previously abnormal, due for labs since dose adjusted, continue current treatment   -     TSH 3RD GENERATION    4. Gastroesophageal reflux disease without esophagitis- recurrent, slightly worse, differential dx reviewed, will restart meds x 1 month, reviewed life style and diet changes  -     famotidine (PEPCID) 40 mg tablet; Take 1 Tab by mouth daily. 5. Swelling of knee joint, right- this is a new problem, symptoms are: stable, differential dx reviewed with the patient, exact etiology is unclear at this time. Since there is no pain will treat with wrap, ice, and elevation. Red flags were reviewed with the patient to RTC or notify me, expected time course for resolution reviewed. Weight is stable, I have recommended the following interventions: encourage exercise and lifestyle education regarding diet. Follow-up and Dispositions    · Return in about 6 months (around 7/17/2020) for Regular follow up. Subjective:   Kristi was seen today for Hypertension; Hypothyroidism; and Knee Swelling    Cardiovascular Review  The patient has hypertension and obesity. Since last visit: HCTZ dose adjusted and stopped Amlodipine (swelling). She reports taking medications as instructed, no medication side effects noted, home BP monitoring in range of 879-381'Z systolic over 99-08'O diastolic.   Diet and Lifestyle: generally follows a low fat low cholesterol diet, generally follows a low sodium diet, exercises regularly. Labs: reviewed and discussed with patient. Endocrine Review  Patient is seen for followup of hypothyroidism. Since last visit: dose reduced  She reports medication compliance: all the time and is taking separate from all other meds. She reports the following concerns/problems/med side effects: none. Lab review: labs reviewed and discussed with patient. Brief Labs:     Lab Results   Component Value Date/Time    Sodium 140 06/11/2019 08:51 AM    Potassium 3.8 06/11/2019 08:51 AM    Creatinine 0.55 06/11/2019 08:51 AM    Cholesterol, total 235 11/01/2018 11:22 AM    HDL Cholesterol 67 11/01/2018 11:22 AM    LDL, calculated 151 11/01/2018 11:22 AM    Triglyceride 86 11/01/2018 11:22 AM    TSH 0.371 06/11/2019 08:51 AM          Prior to Admission medications    Medication Sig Start Date End Date Taking? Authorizing Provider   levothyroxine (SYNTHROID) 112 mcg tablet Take  by mouth. Take 1 tab daily except 1/2 tab on Sunday. Yes Provider, Historical   hydroCHLOROthiazide (HYDRODIURIL) 12.5 mg tablet Take 1 Tab by mouth daily. 12/16/19  Yes Evelio Miller MD   metoprolol tartrate (LOPRESSOR) 50 mg tablet TAKE ONE TABLET BY MOUTH TWICE A DAY 7/11/19  Yes Evelio Miller MD   cyanocobalamin (VITAMIN B-12) 1,000 mcg tablet Take 1,000 mcg by mouth every other day. Yes Provider, Historical   acetaminophen (TYLENOL) 325 mg tablet Take  by mouth every four (4) hours as needed for Pain. Yes Provider, Historical   aspirin 81 mg tablet Take 1 Tab by mouth daily. 11/18/11  Yes Evelio Miller MD   CALCIUM CARBONATE (CALCIUM 600 PO) Take 1 Tab by mouth two (2) times a day. Yes Provider, Historical   MULTIVITAMIN PO Take 1 Tab by mouth daily. 5/31/11  Yes Provider, Historical   levothyroxine (SYNTHROID) 112 mcg tablet TAKE ONE TABLET BY MOUTH DAILY 12/9/19 1/17/20  Evelio Miller MD   amLODIPine (NORVASC) 10 mg tablet Take 1 Tab by mouth daily.  6/11/19 1/17/20 Tish Hart MD          Allergies   Allergen Reactions    Lisinopril Cough    Simvastatin Myalgia           Review of Systems   Constitutional: Negative for malaise/fatigue and weight loss. Respiratory: Negative for cough and shortness of breath. Cardiovascular: Negative for chest pain and palpitations. Gastrointestinal: Positive for heartburn (new, epigastric, every other day, slightly worse with some foods (she thinks)). Negative for abdominal pain, constipation, diarrhea, nausea and vomiting. Musculoskeletal: Negative for joint pain. R knee has had on/off swelling for the last week, no h/o trauma, no pain, worse as the day goes on. No edema in the ankles        Objective:   Physical Exam  Constitutional:       General: She is not in acute distress. Appearance: Normal appearance. Eyes:      Conjunctiva/sclera: Conjunctivae normal.   Neck:      Thyroid: No thyromegaly or thyroid tenderness. Cardiovascular:      Rate and Rhythm: Regular rhythm. Heart sounds: No murmur. Pulmonary:      Effort: Pulmonary effort is normal.      Breath sounds: Normal breath sounds. No decreased breath sounds or wheezing. Abdominal:      General: Bowel sounds are normal.      Palpations: Abdomen is soft. Tenderness: There is no tenderness. Musculoskeletal:      Right knee: She exhibits swelling (superior/lateral aspect). She exhibits normal range of motion. No tenderness found. No patellar tendon tenderness noted. Right lower leg: No edema. Left lower leg: No edema.    Psychiatric:         Mood and Affect: Mood and affect normal.           Visit Vitals  /80   Pulse 68   Temp 97.6 °F (36.4 °C) (Oral)   Resp 16   Ht 5' 2\" (1.575 m)   Wt 160 lb (72.6 kg)   LMP 01/01/2003   SpO2 99%   BMI 29.26 kg/m²         Disclaimer:  Advised her to call back or return to office if symptoms worsen/change/persist.  Discussed expected course/resolution/complications of diagnosis in detail with patient. Medication risks/benefits/costs/interactions/alternatives discussed with patient. She was given an after visit summary which includes diagnoses, current medications, & vitals. She expressed understanding with the diagnosis and plan. Aspects of this note may have been generated using voice recognition software. Despite editing, there may be some syntax errors.        Arya Edwards MD

## 2020-01-18 LAB
ALBUMIN SERPL-MCNC: 4.7 G/DL (ref 3.6–4.8)
ALBUMIN/GLOB SERPL: 1.6 {RATIO} (ref 1.2–2.2)
ALP SERPL-CCNC: 110 IU/L (ref 39–117)
ALT SERPL-CCNC: 22 IU/L (ref 0–32)
AST SERPL-CCNC: 23 IU/L (ref 0–40)
BILIRUB SERPL-MCNC: 0.4 MG/DL (ref 0–1.2)
BUN SERPL-MCNC: 7 MG/DL (ref 8–27)
BUN/CREAT SERPL: 11 (ref 12–28)
CALCIUM SERPL-MCNC: 9.9 MG/DL (ref 8.7–10.3)
CHLORIDE SERPL-SCNC: 99 MMOL/L (ref 96–106)
CHOLEST SERPL-MCNC: 240 MG/DL (ref 100–199)
CO2 SERPL-SCNC: 21 MMOL/L (ref 20–29)
CREAT SERPL-MCNC: 0.64 MG/DL (ref 0.57–1)
GLOBULIN SER CALC-MCNC: 2.9 G/DL (ref 1.5–4.5)
GLUCOSE SERPL-MCNC: 95 MG/DL (ref 65–99)
HDLC SERPL-MCNC: 65 MG/DL
LDLC SERPL CALC-MCNC: 158 MG/DL (ref 0–99)
POTASSIUM SERPL-SCNC: 3.9 MMOL/L (ref 3.5–5.2)
PROT SERPL-MCNC: 7.6 G/DL (ref 6–8.5)
SODIUM SERPL-SCNC: 138 MMOL/L (ref 134–144)
TRIGL SERPL-MCNC: 87 MG/DL (ref 0–149)
TSH SERPL DL<=0.005 MIU/L-ACNC: 1.77 UIU/ML (ref 0.45–4.5)
VLDLC SERPL CALC-MCNC: 17 MG/DL (ref 5–40)

## 2020-01-20 NOTE — PROGRESS NOTES
Letter sent to patient. All labs are stable or at goal for her.   TSH improved and at goal.  10 yr CVD risk is 7.8%, will defer statin (allergy to simvastatin)

## 2020-01-23 ENCOUNTER — TELEPHONE (OUTPATIENT)
Dept: INTERNAL MEDICINE CLINIC | Age: 65
End: 2020-01-23

## 2020-04-03 ENCOUNTER — HOSPITAL ENCOUNTER (OUTPATIENT)
Age: 65
Setting detail: OBSERVATION
Discharge: HOME | End: 2020-04-03
Attending: EMERGENCY MEDICINE | Admitting: STUDENT IN AN ORGANIZED HEALTH CARE EDUCATION/TRAINING PROGRAM
Payer: MEDICAID

## 2020-04-03 ENCOUNTER — APPOINTMENT (OUTPATIENT)
Dept: GENERAL RADIOLOGY | Age: 65
End: 2020-04-03
Attending: EMERGENCY MEDICINE
Payer: MEDICAID

## 2020-04-03 ENCOUNTER — APPOINTMENT (OUTPATIENT)
Dept: NON INVASIVE DIAGNOSTICS | Age: 65
End: 2020-04-03
Attending: INTERNAL MEDICINE
Payer: MEDICAID

## 2020-04-03 VITALS
SYSTOLIC BLOOD PRESSURE: 123 MMHG | TEMPERATURE: 97.6 F | HEART RATE: 51 BPM | OXYGEN SATURATION: 100 % | BODY MASS INDEX: 29.37 KG/M2 | HEIGHT: 62 IN | DIASTOLIC BLOOD PRESSURE: 71 MMHG | RESPIRATION RATE: 18 BRPM | WEIGHT: 159.61 LBS

## 2020-04-03 DIAGNOSIS — R77.8 ELEVATED TROPONIN: ICD-10-CM

## 2020-04-03 DIAGNOSIS — I20.0 UNSTABLE ANGINA (HCC): ICD-10-CM

## 2020-04-03 DIAGNOSIS — R00.2 PALPITATIONS: Primary | ICD-10-CM

## 2020-04-03 LAB
ALBUMIN SERPL-MCNC: 3.8 G/DL (ref 3.5–5)
ALBUMIN/GLOB SERPL: 0.8 {RATIO} (ref 1.1–2.2)
ALP SERPL-CCNC: 138 U/L (ref 45–117)
ALT SERPL-CCNC: 30 U/L (ref 12–78)
ANION GAP SERPL CALC-SCNC: 5 MMOL/L (ref 5–15)
AST SERPL-CCNC: 21 U/L (ref 15–37)
ATRIAL RATE: 71 BPM
AV VELOCITY RATIO: 0.71
BASOPHILS # BLD: 0.1 K/UL (ref 0–0.1)
BASOPHILS NFR BLD: 1 % (ref 0–1)
BILIRUB SERPL-MCNC: 0.3 MG/DL (ref 0.2–1)
BUN SERPL-MCNC: 11 MG/DL (ref 6–20)
BUN/CREAT SERPL: 16 (ref 12–20)
CALCIUM SERPL-MCNC: 9.5 MG/DL (ref 8.5–10.1)
CALCULATED P AXIS, ECG09: 50 DEGREES
CALCULATED R AXIS, ECG10: 51 DEGREES
CALCULATED T AXIS, ECG11: 47 DEGREES
CHLORIDE SERPL-SCNC: 104 MMOL/L (ref 97–108)
CO2 SERPL-SCNC: 29 MMOL/L (ref 21–32)
COMMENT, HOLDF: NORMAL
CREAT SERPL-MCNC: 0.68 MG/DL (ref 0.55–1.02)
D DIMER PPP FEU-MCNC: <0.19 MG/L FEU (ref 0–0.65)
DIAGNOSIS, 93000: NORMAL
DIFFERENTIAL METHOD BLD: ABNORMAL
ECHO AO ROOT DIAM: 3.04 CM
ECHO AV AREA PEAK VELOCITY: 2.7 CM2
ECHO AV PEAK GRADIENT: 7.8 MMHG
ECHO AV PEAK VELOCITY: 139.89 CM/S
ECHO EST RA PRESSURE: 3 MMHG
ECHO LA AREA 4C: 16 CM2
ECHO LA MAJOR AXIS: 3.39 CM
ECHO LA TO AORTIC ROOT RATIO: 1.11
ECHO LA VOL 2C: 32.07 ML (ref 22–52)
ECHO LA VOL 4C: 35.9 ML (ref 22–52)
ECHO LA VOL BP: 38.3 ML (ref 22–52)
ECHO LA VOL/BSA BIPLANE: 22.05 ML/M2 (ref 16–28)
ECHO LA VOLUME INDEX A2C: 18.47 ML/M2 (ref 16–28)
ECHO LA VOLUME INDEX A4C: 20.67 ML/M2 (ref 16–28)
ECHO LV E' LATERAL VELOCITY: 12.44 CM/S
ECHO LV E' SEPTAL VELOCITY: 8.78 CM/S
ECHO LV INTERNAL DIMENSION DIASTOLIC: 4.94 CM (ref 3.9–5.3)
ECHO LV INTERNAL DIMENSION SYSTOLIC: 3.36 CM
ECHO LV IVSD: 1.15 CM (ref 0.6–0.9)
ECHO LV MASS 2D: 250.2 G (ref 67–162)
ECHO LV MASS INDEX 2D: 144.1 G/M2 (ref 43–95)
ECHO LV POSTERIOR WALL DIASTOLIC: 1.11 CM (ref 0.6–0.9)
ECHO LVOT DIAM: 2.21 CM
ECHO LVOT PEAK GRADIENT: 4 MMHG
ECHO LVOT PEAK VELOCITY: 99.43 CM/S
ECHO MV A VELOCITY: 73.1 CM/S
ECHO MV AREA PHT: 2.6 CM2
ECHO MV E DECELERATION TIME (DT): 293.6 MS
ECHO MV E VELOCITY: 67.55 CM/S
ECHO MV E/A RATIO: 0.9
ECHO MV E/E' LATERAL: 5.43
ECHO MV E/E' RATIO (AVERAGED): 6.56
ECHO MV E/E' SEPTAL: 7.69
ECHO MV PRESSURE HALF TIME (PHT): 85.1 MS
ECHO PULMONARY ARTERY SYSTOLIC PRESSURE (PASP): 17.1 MMHG
ECHO PV MAX VELOCITY: 95.51 CM/S
ECHO PV PEAK GRADIENT: 3.6 MMHG
ECHO RIGHT VENTRICULAR SYSTOLIC PRESSURE (RVSP): 17.1 MMHG
ECHO RV INTERNAL DIMENSION: 4.55 CM
ECHO RV TAPSE: 3.41 CM (ref 1.5–2)
ECHO TV REGURGITANT MAX VELOCITY: 187.63 CM/S
ECHO TV REGURGITANT PEAK GRADIENT: 14.1 MMHG
EOSINOPHIL # BLD: 0.3 K/UL (ref 0–0.4)
EOSINOPHIL NFR BLD: 3 % (ref 0–7)
ERYTHROCYTE [DISTWIDTH] IN BLOOD BY AUTOMATED COUNT: 12.5 % (ref 11.5–14.5)
GLOBULIN SER CALC-MCNC: 4.5 G/DL (ref 2–4)
GLUCOSE SERPL-MCNC: 130 MG/DL (ref 65–100)
HCT VFR BLD AUTO: 44 % (ref 35–47)
HGB BLD-MCNC: 14.6 G/DL (ref 11.5–16)
IMM GRANULOCYTES # BLD AUTO: 0 K/UL (ref 0–0.04)
IMM GRANULOCYTES NFR BLD AUTO: 0 % (ref 0–0.5)
LVFS 2D: 31.94 %
LYMPHOCYTES # BLD: 3.7 K/UL (ref 0.8–3.5)
LYMPHOCYTES NFR BLD: 36 % (ref 12–49)
MAGNESIUM SERPL-MCNC: 2.3 MG/DL (ref 1.6–2.4)
MCH RBC QN AUTO: 31.8 PG (ref 26–34)
MCHC RBC AUTO-ENTMCNC: 33.2 G/DL (ref 30–36.5)
MCV RBC AUTO: 95.9 FL (ref 80–99)
MONOCYTES # BLD: 0.9 K/UL (ref 0–1)
MONOCYTES NFR BLD: 9 % (ref 5–13)
MV DEC SLOPE: 2.3
NEUTS SEG # BLD: 5.3 K/UL (ref 1.8–8)
NEUTS SEG NFR BLD: 51 % (ref 32–75)
NRBC # BLD: 0 K/UL (ref 0–0.01)
NRBC BLD-RTO: 0 PER 100 WBC
P-R INTERVAL, ECG05: 144 MS
PLATELET # BLD AUTO: 372 K/UL (ref 150–400)
PMV BLD AUTO: 9.5 FL (ref 8.9–12.9)
POTASSIUM SERPL-SCNC: 3.8 MMOL/L (ref 3.5–5.1)
PROT SERPL-MCNC: 8.3 G/DL (ref 6.4–8.2)
Q-T INTERVAL, ECG07: 402 MS
QRS DURATION, ECG06: 94 MS
QTC CALCULATION (BEZET), ECG08: 436 MS
RBC # BLD AUTO: 4.59 M/UL (ref 3.8–5.2)
SAMPLES BEING HELD,HOLD: NORMAL
SODIUM SERPL-SCNC: 138 MMOL/L (ref 136–145)
TROPONIN I SERPL-MCNC: 0.27 NG/ML
TROPONIN I SERPL-MCNC: 0.32 NG/ML
TROPONIN I SERPL-MCNC: 0.36 NG/ML
TSH SERPL DL<=0.05 MIU/L-ACNC: 4.9 UIU/ML (ref 0.36–3.74)
VENTRICULAR RATE, ECG03: 71 BPM
WBC # BLD AUTO: 10.3 K/UL (ref 3.6–11)

## 2020-04-03 PROCEDURE — 93458 L HRT ARTERY/VENTRICLE ANGIO: CPT | Performed by: INTERNAL MEDICINE

## 2020-04-03 PROCEDURE — 99284 EMERGENCY DEPT VISIT MOD MDM: CPT

## 2020-04-03 PROCEDURE — 93005 ELECTROCARDIOGRAM TRACING: CPT

## 2020-04-03 PROCEDURE — 93306 TTE W/DOPPLER COMPLETE: CPT

## 2020-04-03 PROCEDURE — 74011250636 HC RX REV CODE- 250/636: Performed by: INTERNAL MEDICINE

## 2020-04-03 PROCEDURE — 96372 THER/PROPH/DIAG INJ SC/IM: CPT

## 2020-04-03 PROCEDURE — 74011636320 HC RX REV CODE- 636/320: Performed by: INTERNAL MEDICINE

## 2020-04-03 PROCEDURE — 71045 X-RAY EXAM CHEST 1 VIEW: CPT

## 2020-04-03 PROCEDURE — 99218 HC RM OBSERVATION: CPT

## 2020-04-03 PROCEDURE — 74011250636 HC RX REV CODE- 250/636: Performed by: STUDENT IN AN ORGANIZED HEALTH CARE EDUCATION/TRAINING PROGRAM

## 2020-04-03 PROCEDURE — 85379 FIBRIN DEGRADATION QUANT: CPT

## 2020-04-03 PROCEDURE — 84443 ASSAY THYROID STIM HORMONE: CPT

## 2020-04-03 PROCEDURE — C1894 INTRO/SHEATH, NON-LASER: HCPCS | Performed by: INTERNAL MEDICINE

## 2020-04-03 PROCEDURE — 84484 ASSAY OF TROPONIN QUANT: CPT

## 2020-04-03 PROCEDURE — 80053 COMPREHEN METABOLIC PANEL: CPT

## 2020-04-03 PROCEDURE — 99152 MOD SED SAME PHYS/QHP 5/>YRS: CPT | Performed by: INTERNAL MEDICINE

## 2020-04-03 PROCEDURE — 74011250637 HC RX REV CODE- 250/637: Performed by: STUDENT IN AN ORGANIZED HEALTH CARE EDUCATION/TRAINING PROGRAM

## 2020-04-03 PROCEDURE — 77030019569 HC BND COMPR RAD TERU -B: Performed by: INTERNAL MEDICINE

## 2020-04-03 PROCEDURE — 77030004532 HC CATH ANGI DX IMP BSC -A: Performed by: INTERNAL MEDICINE

## 2020-04-03 PROCEDURE — 36415 COLL VENOUS BLD VENIPUNCTURE: CPT

## 2020-04-03 PROCEDURE — 99153 MOD SED SAME PHYS/QHP EA: CPT | Performed by: INTERNAL MEDICINE

## 2020-04-03 PROCEDURE — 77030010221 HC SPLNT WR POS TELE -B: Performed by: INTERNAL MEDICINE

## 2020-04-03 PROCEDURE — 83735 ASSAY OF MAGNESIUM: CPT

## 2020-04-03 PROCEDURE — 74011000250 HC RX REV CODE- 250: Performed by: INTERNAL MEDICINE

## 2020-04-03 PROCEDURE — 85025 COMPLETE CBC W/AUTO DIFF WBC: CPT

## 2020-04-03 PROCEDURE — 77030013744: Performed by: INTERNAL MEDICINE

## 2020-04-03 RX ORDER — ONDANSETRON 2 MG/ML
4 INJECTION INTRAMUSCULAR; INTRAVENOUS
Status: DISCONTINUED | OUTPATIENT
Start: 2020-04-03 | End: 2020-04-03 | Stop reason: HOSPADM

## 2020-04-03 RX ORDER — HYDROCHLOROTHIAZIDE 25 MG/1
12.5 TABLET ORAL DAILY
Status: DISCONTINUED | OUTPATIENT
Start: 2020-04-03 | End: 2020-04-03 | Stop reason: HOSPADM

## 2020-04-03 RX ORDER — SODIUM CHLORIDE 0.9 % (FLUSH) 0.9 %
5-40 SYRINGE (ML) INJECTION EVERY 8 HOURS
Status: CANCELLED | OUTPATIENT
Start: 2020-04-03

## 2020-04-03 RX ORDER — FENTANYL CITRATE 50 UG/ML
INJECTION, SOLUTION INTRAMUSCULAR; INTRAVENOUS AS NEEDED
Status: DISCONTINUED | OUTPATIENT
Start: 2020-04-03 | End: 2020-04-03 | Stop reason: HOSPADM

## 2020-04-03 RX ORDER — MORPHINE SULFATE 2 MG/ML
1 INJECTION, SOLUTION INTRAMUSCULAR; INTRAVENOUS
Status: DISCONTINUED | OUTPATIENT
Start: 2020-04-03 | End: 2020-04-03 | Stop reason: HOSPADM

## 2020-04-03 RX ORDER — LIDOCAINE HYDROCHLORIDE 10 MG/ML
INJECTION INFILTRATION; PERINEURAL AS NEEDED
Status: DISCONTINUED | OUTPATIENT
Start: 2020-04-03 | End: 2020-04-03 | Stop reason: HOSPADM

## 2020-04-03 RX ORDER — LEVOTHYROXINE SODIUM 112 UG/1
56 TABLET ORAL
COMMUNITY
End: 2020-04-24 | Stop reason: SDUPTHER

## 2020-04-03 RX ORDER — SODIUM CHLORIDE 9 MG/ML
500 INJECTION, SOLUTION INTRAVENOUS CONTINUOUS
Status: CANCELLED | OUTPATIENT
Start: 2020-04-03 | End: 2020-04-07

## 2020-04-03 RX ORDER — SODIUM CHLORIDE 0.9 % (FLUSH) 0.9 %
5-40 SYRINGE (ML) INJECTION AS NEEDED
Status: DISCONTINUED | OUTPATIENT
Start: 2020-04-03 | End: 2020-04-03 | Stop reason: HOSPADM

## 2020-04-03 RX ORDER — FAMOTIDINE 20 MG/1
40 TABLET, FILM COATED ORAL DAILY
Status: DISCONTINUED | OUTPATIENT
Start: 2020-04-03 | End: 2020-04-03 | Stop reason: HOSPADM

## 2020-04-03 RX ORDER — GUAIFENESIN 100 MG/5ML
81 LIQUID (ML) ORAL DAILY
Status: DISCONTINUED | OUTPATIENT
Start: 2020-04-03 | End: 2020-04-03 | Stop reason: HOSPADM

## 2020-04-03 RX ORDER — HEPARIN SODIUM 200 [USP'U]/100ML
INJECTION, SOLUTION INTRAVENOUS
Status: COMPLETED | OUTPATIENT
Start: 2020-04-03 | End: 2020-04-03

## 2020-04-03 RX ORDER — SODIUM CHLORIDE 0.9 % (FLUSH) 0.9 %
5-40 SYRINGE (ML) INJECTION EVERY 8 HOURS
Status: DISCONTINUED | OUTPATIENT
Start: 2020-04-03 | End: 2020-04-03 | Stop reason: HOSPADM

## 2020-04-03 RX ORDER — LEVOTHYROXINE SODIUM 112 UG/1
112 TABLET ORAL
Status: DISCONTINUED | OUTPATIENT
Start: 2020-04-03 | End: 2020-04-03 | Stop reason: HOSPADM

## 2020-04-03 RX ORDER — HEPARIN SODIUM 1000 [USP'U]/ML
INJECTION, SOLUTION INTRAVENOUS; SUBCUTANEOUS AS NEEDED
Status: DISCONTINUED | OUTPATIENT
Start: 2020-04-03 | End: 2020-04-03 | Stop reason: HOSPADM

## 2020-04-03 RX ORDER — SODIUM CHLORIDE 0.9 % (FLUSH) 0.9 %
5-40 SYRINGE (ML) INJECTION AS NEEDED
Status: CANCELLED | OUTPATIENT
Start: 2020-04-03

## 2020-04-03 RX ORDER — ROSUVASTATIN CALCIUM 10 MG/1
5 TABLET, COATED ORAL
Status: DISCONTINUED | OUTPATIENT
Start: 2020-04-03 | End: 2020-04-03 | Stop reason: HOSPADM

## 2020-04-03 RX ORDER — GUAIFENESIN 100 MG/5ML
324 LIQUID (ML) ORAL
Status: DISCONTINUED | OUTPATIENT
Start: 2020-04-03 | End: 2020-04-03

## 2020-04-03 RX ORDER — LEVOTHYROXINE SODIUM 112 UG/1
56 TABLET ORAL
Status: DISCONTINUED | OUTPATIENT
Start: 2020-04-05 | End: 2020-04-03 | Stop reason: HOSPADM

## 2020-04-03 RX ORDER — ENOXAPARIN SODIUM 100 MG/ML
40 INJECTION SUBCUTANEOUS EVERY 24 HOURS
Status: DISCONTINUED | OUTPATIENT
Start: 2020-04-03 | End: 2020-04-03 | Stop reason: HOSPADM

## 2020-04-03 RX ORDER — ACETAMINOPHEN 325 MG/1
650 TABLET ORAL
Status: DISCONTINUED | OUTPATIENT
Start: 2020-04-03 | End: 2020-04-03 | Stop reason: HOSPADM

## 2020-04-03 RX ORDER — MIDAZOLAM HYDROCHLORIDE 1 MG/ML
INJECTION, SOLUTION INTRAMUSCULAR; INTRAVENOUS AS NEEDED
Status: DISCONTINUED | OUTPATIENT
Start: 2020-04-03 | End: 2020-04-03 | Stop reason: HOSPADM

## 2020-04-03 RX ORDER — METOPROLOL TARTRATE 50 MG/1
50 TABLET ORAL 2 TIMES DAILY
Status: DISCONTINUED | OUTPATIENT
Start: 2020-04-03 | End: 2020-04-03 | Stop reason: HOSPADM

## 2020-04-03 RX ADMIN — METOPROLOL TARTRATE 50 MG: 50 TABLET, FILM COATED ORAL at 19:01

## 2020-04-03 RX ADMIN — FAMOTIDINE 40 MG: 20 TABLET ORAL at 08:14

## 2020-04-03 RX ADMIN — ENOXAPARIN SODIUM 40 MG: 40 INJECTION SUBCUTANEOUS at 03:36

## 2020-04-03 RX ADMIN — HYDROCHLOROTHIAZIDE 12.5 MG: 25 TABLET ORAL at 08:14

## 2020-04-03 RX ADMIN — ASPIRIN 81 MG 81 MG: 81 TABLET ORAL at 08:14

## 2020-04-03 RX ADMIN — LEVOTHYROXINE SODIUM 112 MCG: 112 TABLET ORAL at 06:10

## 2020-04-03 RX ADMIN — METOPROLOL TARTRATE 50 MG: 50 TABLET, FILM COATED ORAL at 08:14

## 2020-04-03 RX ADMIN — Medication 10 ML: at 06:15

## 2020-04-03 NOTE — PROGRESS NOTES
Cardiac Cath Lab Procedure Area Arrival Note:    Sean Esquivel arrived to Cardiac Cath Lab, Procedure Area. Patient identifiers verified with NAME and DATE OF BIRTH. Procedure verified with patient. Consent forms verified. Allergies verified. Patient informed of procedure and plan of care. Questions answered with review. Patient voiced understanding of procedure and plan of care. Patient on cardiac monitor, non-invasive blood pressure, SPO2 monitor. On  or O2 @ 2 lpm via NC.  IV of NS on pump at 75 ml/hr. Patient status doing well without problems. Patient is A&Ox 4. Patient reports no pain. Patient medicated during procedure with orders obtained and verified by Dr. Alia Maldonado. Refer to patients Cardiac Cath Lab PROCEDURE REPORT for vital signs, assessment, status, and response during procedure, printed at end of case. Printed report on chart or scanned into chart. TRANSFER - OUT REPORT:    Verbal report given to RT Akhil (JUDY) on Saen Esquivel being transferred to Cath Lab Recovery for routine progression of care       Report consisted of patients Situation, Background, Assessment and   Recommendations(SBAR). Information from the following report(s) SBAR, Procedure Summary and MAR was reviewed with the receiving nurse. Opportunity for questions and clarification was provided.

## 2020-04-03 NOTE — ED TRIAGE NOTES
Arrived from home, reporting rapid heart rate, that started yesterday. Reports episodes are intermittent, lasting 10-15 min, with some SOB during supine position.      Denies CP, NVD, Dizziness

## 2020-04-03 NOTE — PROGRESS NOTES
Patient admitted this am by Dr Bobby Castro for palpitations and has elevated trop   For [address] today

## 2020-04-03 NOTE — PROGRESS NOTES
TRANSFER - OUT REPORT:    Verbal report given to Siobhan Yao RN on [facility] being transferred to [facility] for routine progression of care       Report consisted of patients Situation, Background, Assessment and   Recommendations(SBAR). Information from the following report(s) Kardex and Procedure Summary was reviewed with the receiving nurse. Opportunity for questions and clarification was provided.

## 2020-04-03 NOTE — PROGRESS NOTES
Reason for Admission:  The patient presented to ER with elevated HR, to undergo a LHC today                     RUR Score:  No risk of re-admission score yet assigned at this time                    Plan for utilizing home health: The patient is not currently open to any home health services- will monitor for any H2H needs/diagnosis     PCP: First and Last name:  Dr. Dallas Mcburney   Name of Practice: [facility]   Are you a current patient: Yes/No: Yes   Approximate date of last visit: 2020                    Current Advanced Directive/Advance Care Plan: Full code, not on file                          Transition of Care Plan:  Home    The CM met with the patient at bedside in order to introduce the role of CM and assess for patient needs. The patient lives in apartment alone, however, she endorses that her Landlord's son also spends some time in the apartment with her. The patient verified demographics- the patient does not currently have health insurance. The patient endorses that she works at [employer] and is working on getting health insurance through [employer]- she is working with her supervisor and paperwork is being filed. The patient anticipates that she will hopefully have health insurance coverage by May. The patient endorses that she used to have a Care Card application but it - CM provided additional Care Card, [employer] anticipated to screen patient for Medicaid. The patient speaks and reads English, but [place]ican is her first language- CM provided in [place] per patient's preference. The patient is independent with ADLs and mobility. The patient denied difficulty affording or accessing medications- she utilizes [employer] and has her prescriptions at bedside, total cost approximately $25.00. The CM provided a [employer] savings card at bedside to also utilize at [employer]  The patient has three sons, however, son Alivia Hines ([phone]) lives in [facility] son Kemal lives in [place], and son Ren Whitehead is in [facility] currently. Patient's local emergency contact is her brother Jesus Nair ([phone]). CM will add Malik Patino to emergency contacts per patient request. The patient endorses that her son will transport home upon discharge. Observation notice provided in writing to patient and/or caregiver as well as verbal explanation of the policy. Patients who are in outpatient status also receive the Observation notice. CM will follow for transitions of care. Veronica Bernheim, MSW     Care Management Interventions  PCP Verified by CM: Yes(Patient verified PCP as Dr. Luis Holliday )  Mode of Transport at Discharge:  Other (see comment)(Patient's brother will transport home upon discharge)  Transition of Care Consult (CM Consult): Discharge Planning  MyChart Signup: No  Discharge Durable Medical Equipment: No  Health Maintenance Reviewed: Yes  Physical Therapy Consult: No  Occupational Therapy Consult: No  Speech Therapy Consult: No  Current Support Network: Own Home, Lives Alone(Patient lives at home in apartment alone, however, she endorses that her landlord's son also spends time in the apartment )  Confirm Follow Up Transport: Family  Discharge Location  Discharge Placement: Home

## 2020-04-03 NOTE — PROCEDURES
Findings:  1)Minor coronary disease--non obstructive with no angiographic evidence of acute plaque rupture  2)NOrmal LVEDP    Recommendation  1)Primary prevention for CAD  2)Trop elevation may be related to arrhythmia    Access: Right radial    Contrast 23 cc.

## 2020-04-03 NOTE — PROGRESS NOTES
TRANSFER - IN REPORT:    Verbal report received from [place] (name) on Laurence Ganis  being received from ED (unit) for routine progression of care      Report consisted of patients Situation, Background, Assessment and   Recommendations(SBAR). Information from the following report(s) SBAR, Kardex, ED Summary, Recent Results and Cardiac Rhythm NSR was reviewed with the receiving nurse. Opportunity for questions and clarification was provided. Assessment completed upon patients arrival to unit and care assumed.

## 2020-04-03 NOTE — ED PROVIDER NOTES
HPI     72-year-old female with a history of high cholesterol, hypertension, hypothyroid, GERD, presents the emergency department complaining of fast heartbeat. She states she has been having episodes of her heart racing since yesterday. She states it only lasted a few minutes but it happens multiple times throughout the day. She states she does get a little short of breath with it but does not have chest pain or feel dizzy. She states she has been nauseated some. She does not get sweaty. She has not had any pain or swelling in her legs or any recent travel. She does not smoke. She denies fever cough cold or congestion.     Past Medical History:   Diagnosis Date    GERD (gastroesophageal reflux disease)     Hypercholesterolemia     Hyperlipidemia 5/26/2011    Hypertension     Hypothyroid     Microscopic hematuria 1/2011    - Dr Soo Powell, w/u negative    Other ill-defined conditions(799.89)     states may have sleep apnea but never tested       Past Surgical History:   Procedure Laterality Date    HX CHOLECYSTECTOMY  6/2/11    HX COLONOSCOPY  6/21/12    HX HERNIA REPAIR      HX HYSTERECTOMY  2003    for fibroids, cervix is present         Family History:   Problem Relation Age of Onset    Other Mother         uterine fibroids    Post-op Nausea/Vomiting Mother     High Cholesterol Father     Coronary Artery Disease Father        Social History     Socioeconomic History    Marital status:      Spouse name: Not on file    Number of children: Not on file    Years of education: Not on file    Highest education level: Not on file   Occupational History    Not on file   Social Needs    Financial resource strain: Not on file    Food insecurity     Worry: Not on file     Inability: Not on file    Transportation needs     Medical: Not on file     Non-medical: Not on file   Tobacco Use    Smoking status: Never Smoker    Smokeless tobacco: Never Used   Substance and Sexual Activity    Alcohol use: No    Drug use: No    Sexual activity: Not Currently     Partners: Male     Birth control/protection: Surgical   Lifestyle    Physical activity     Days per week: Not on file     Minutes per session: Not on file    Stress: Not on file   Relationships    Social connections     Talks on phone: Not on file     Gets together: Not on file     Attends Ca[employer]ism service: Not on file     Active member of club or organization: Not on file     Attends meetings of clubs or organizations: Not on file     Relationship status: Not on file    Intimate partner violence     Fear of current or ex partner: Not on file     Emotionally abused: Not on file     Physically abused: Not on file     Forced sexual activity: Not on file   Other Topics Concern    Not on file   Social History Narrative    Not on file         ALLERGIES: Lisinopril and Simvastatin    Review of Systems   Constitutional: Negative for fever. HENT: Negative for congestion. Respiratory: Positive for shortness of breath. Negative for cough. Cardiovascular: Positive for palpitations. Negative for chest pain and leg swelling. Gastrointestinal: Negative for abdominal pain, nausea and vomiting. Genitourinary: Negative for dysuria. Musculoskeletal: Negative for gait problem. Skin: Negative for rash. Neurological: Negative for headaches. Psychiatric/Behavioral: Positive for dysphoric mood. Vitals:    04/03/20 0118   BP: (!) 185/96   Pulse: 78   Resp: 16   Temp: 98.1 °F (36.7 °C)   SpO2: 100%            Physical Exam  Constitutional:       General: She is not in acute distress. Appearance: She is well-developed. HENT:      Head: Normocephalic and atraumatic. Mouth/Throat:      Pharynx: No oropharyngeal exudate. Eyes:      General: No scleral icterus. Right eye: No discharge. Left eye: No discharge. Pupils: Pupils are equal, round, and reactive to light.    Neck:      Musculoskeletal: Normal range of motion and neck supple. Vascular: No JVD. Cardiovascular:      Rate and Rhythm: Normal rate and regular rhythm. Heart sounds: Normal heart sounds. No murmur. Pulmonary:      Effort: Pulmonary effort is normal. No respiratory distress. Breath sounds: Normal breath sounds. No stridor. No wheezing or rales. Chest:      Chest wall: No tenderness. Abdominal:      General: Bowel sounds are normal. There is no distension. Palpations: Abdomen is soft. There is no mass. Tenderness: There is no abdominal tenderness. There is no guarding or rebound. Musculoskeletal: Normal range of motion. Skin:     General: Skin is warm and dry. Capillary Refill: Capillary refill takes less than 2 seconds. Findings: No rash. Neurological:      Mental Status: She is oriented to person, place, and time. Psychiatric:         Behavior: Behavior normal.         Thought Content: Thought content normal.         Judgment: Judgment normal.          MDM       Procedures        ED EKG interpretation:  Rhythm: normal sinus rhythm; and regular . Rate (approx.): 71; Axis: normal; P wave: normal; QRS interval: normal ; ST/T wave: non-specific changes; . This EKG was interpreted by Erica Young MD,ED Provider. Elevated troponin. No chest pain or symptoms currently. Patient took aspirin this morning. WIll admit. Hospitalist Perfect Serve for Admission  2:10 AM    ED Room Number: RM60/15  Patient Name and age:  Jadyn Roberts 59 y.o.  female  Working Diagnosis:   1. Palpitations    2. Elevated troponin      COVID-19 Suspicion:  no  Readmission: no  Isolation Requirements:  no  Recommended Level of Care:  telemetry  Code Status:  Full Code  Department:[facility] Adult ED - (21 [phone]  Other:  59year old female with HTN, Chol, presents with intermittent palpitations with SOB. EKG ok. Troponin elevated at . 27.

## 2020-04-03 NOTE — PROGRESS NOTES
TRANSFER - IN REPORT:    Verbal report received from [facility]by Mason RT [facility] for routine progression of care. Report consisted of patients Situation, Background, Assessment and Recommendations(SBAR). Information from the following report(s) Kardex and Procedure Summary was reviewed with the receiving clinician. Opportunity for questions and clarification was provided. Assessment completed upon patients arrival to [address] [place] [address] and care assumed. Cardiac Cath Lab Recovery Arrival Note:    Aditya Simpson arrived to [facility] recovery area. Patient procedure= LHC. Patient on cardiac monitor, non-invasive blood pressure, SPO2 monitor. On RA . IV  of NS on pump at 75 ml/hr. Patient status doing well without problems. Patient is A&Ox 3. Patient reports no pain. PROCEDURE SITE CHECK:    Procedure site:without any bleeding and no hematoma, No pain/discomfort reported at procedure site. No change in patient status. Continue to monitor patient and status.

## 2020-04-03 NOTE — CONSULTS
Cardiology Consult/Progress Note           4/3/2020  1:15 AM   Elevated troponin [R79.89]  Palpitation [R00.2]   HPI: Barbara Ricketts is a 59 y.o. female admitted for Elevated troponin [R79.89]  Palpitation [R00.2]. She has PMH of HTN, hypothyroid, GERD. Presents with chief c/opalpitations and chest pain. She reported left arm and chest pain 2 week ago after lifting some water bottles. She also has history of intermittent symptoms over the past 2 weeks. Yesterday felt palpitations and heart racing for most of the day prompting ER visit. Had associated nausea and SOB. She has ruled in for NSTEMI with troponin now risen to 0.36. Dr. Roge Iverson who was requested to see for palpitations has requested that Dr. Annetta Monterroso proceed with [facility] today. She had seen Dr. Cooper Loyola in 2012 for palpitations, holter, stress echo were nomal. Has not been followed regularly by cardiology.  [facility]: never smoker, no ETOH  FH:+FH of CAD,  father had CAD, HLD    Investigation   Telemetry: normal sinus rhythm  ECG: , normal sinus rhythm, unchanged from previous tracings  Echocardiogram: Pending. Assessment and PLAN   1. NSTEMI:  Troponin trending up, now 0.37. EKG, NSR, no ischemic changes. Given family history of CAD, and other risk factors (post menopausal, HLD, HTN) will proceed with [facility] today. Will follow up echocardiogram. Continue ASA, statin, BB  2. HLD: HDL 65, . Crestor was started 5 mg q HS. 3. Hx of HTN: bp elevated. Continue HCTZ and Lopressor  4. Hypothyroid: TSH 4.9. Check free t4 On synthroid  5. Palpitations: ? PAF or SVT. Will need followup outpatient after discharge- followup per Dr. Roge Iverson.      []    High complexity decision making was performed  []    Patient is at high-risk of decompensation with multiple organ involvement     Review of Symptoms: (to be updated by Dr. Annetta Monterroso)  Respiratory: No exertional dyspnea, orthopnea, PND, cough, hemoptysis, URI.   Cardiovascular: No CP, +palpitations, no sweating, lightheadedness, dizziness, syncope, presyncope, lower extremity swelling. Otherwise no other pertinent positive or negative symptoms on ROS.   GI: had associated nausea  Patient Active Problem List    Diagnosis Date Noted    Palpitation 04/03/2020    Elevated troponin 04/03/2020    Abnormal mammogram of right breast 09/19/2017    Atrial tachycardia ([address] 11/09/2012    GERD (gastroesophageal reflux disease)     Microscopic hematuria 01/01/2011    Hypothyroid 09/23/2010    Hypertension, essential       July Rodriguez MD  Past Medical History:   Diagnosis Date    GERD (gastroesophageal reflux disease)     Hypercholesterolemia     Hyperlipidemia 5/26/2011    Hypertension     Hypothyroid     Microscopic hematuria 1/2011    - Dr Melinda Evans, w/u negative    Other ill-defined conditions(799.89)     states may have sleep apnea but never tested      Past Surgical History:   Procedure Laterality Date    HX CHOLECYSTECTOMY  6/2/11    HX COLONOSCOPY  6/21/12    HX HERNIA REPAIR      HX HYSTERECTOMY  2003    for fibroids, cervix is present     Social History     Socioeconomic History    Marital status:      Spouse name: Not on file    Number of children: Not on file    Years of education: Not on file    Highest education level: Not on file   Tobacco Use    Smoking status: Never Smoker    Smokeless tobacco: Never Used   Substance and Sexual Activity    Alcohol use: No    Drug use: No    Sexual activity: Not Currently     Partners: Male     Birth control/protection: Surgical     Family History   Problem Relation Age of Onset    Other Mother         uterine fibroids    Post-op Nausea/Vomiting Mother     High Cholesterol Father     Coronary Artery Disease Father       Current Facility-Administered Medications   Medication Dose Route Frequency    aspirin chewable tablet 81 mg  81 mg Oral DAILY    famotidine (PEPCID) tablet 40 mg  40 mg Oral DAILY    levothyroxine (SYNTHROID) tablet 112 mcg  112 mcg Oral Once per day on Mon Tue Wed Thu Fri Sat    metoprolol tartrate (LOPRESSOR) tablet 50 mg  50 mg Oral BID    sodium chloride (NS) flush 5-40 mL  5-40 mL IntraVENous Q8H    sodium chloride (NS) flush 5-40 mL  5-40 mL IntraVENous PRN    acetaminophen (TYLENOL) tablet 650 mg  650 mg Oral Q4H PRN    morphine injection 1 mg  1 mg IntraVENous Q4H PRN    ondansetron (ZOFRAN) injection 4 mg  4 mg IntraVENous Q4H PRN    enoxaparin (LOVENOX) injection 40 mg  40 mg SubCUTAneous Q24H    hydroCHLOROthiazide (HYDRODIURIL) tablet 12.5 mg  12.5 mg Oral DAILY    [START ON 4/5/2020] levothyroxine (SYNTHROID) tablet 56 mcg  56 mcg Oral every Sunday    rosuvastatin (CRESTOR) tablet 5 mg  5 mg Oral QHS      Prior to Admission Medications   Prescriptions Last Dose Informant Patient Reported? Taking? CALCIUM CARBONATE (CALCIUM 600 PO)   Yes Yes   Sig: Take 1 Tab by mouth two (2) times a day. acetaminophen (TYLENOL) 325 mg tablet   Yes Yes   Sig: Take  by mouth every four (4) hours as needed for Pain. aspirin 81 mg tablet   Yes Yes   Sig: Take 1 Tab by mouth daily. cyanocobalamin (VITAMIN B-12) 1,000 mcg tablet   Yes Yes   Sig: Take 1,000 mcg by mouth every other day. famotidine (PEPCID) 40 mg tablet   No Yes   Sig: Take 1 Tab by mouth daily. hydroCHLOROthiazide (HYDRODIURIL) 12.5 mg tablet   No Yes   Sig: Take 1 Tab by mouth daily. levothyroxine (SYNTHROID) 112 mcg tablet   Yes Yes   Sig: Take 112 mcg by mouth six (6) days a week. Take 1 tab daily except 1/2 tab on Sunday. levothyroxine (SYNTHROID) 112 mcg tablet   Yes Yes   Sig: Take 56 mcg by mouth every Sunday. Take 1 tab daily except 1/2 tab on Sunday. metoprolol tartrate (LOPRESSOR) 50 mg tablet   No Yes   Sig: TAKE ONE TABLET BY MOUTH TWICE A DAY   multivitamin (ONE A DAY) tablet   Yes Yes   Sig: Take 1 Tab by mouth daily.       Facility-Administered Medications: None      Allergies   Allergen Reactions  Lisinopril Cough    Simvastatin Myalgia       Labs:   Recent Results (from the past 24 hour(s))   EKG, 12 LEAD, INITIAL    Collection Time: 04/03/20  1:20 AM   Result Value Ref Range    Ventricular Rate 71 BPM    Atrial Rate 71 BPM    P-R Interval 144 ms    QRS Duration 94 ms    Q-T Interval 402 ms    QTC Calculation (Bezet) 436 ms    Calculated P Axis 50 degrees    Calculated R Axis 51 degrees    Calculated T Axis 47 degrees    Diagnosis       Normal sinus rhythm  When compared with ECG of 31-MAY-2011 15:29,  No significant change was found  Confirmed by Yoan Hemphill M.D., Rl Rosas ([id number]) on 4/3/2020 8:27:50 AM     SAMPLES BEING HELD    Collection Time: 04/03/20  1:28 AM   Result Value Ref Range    SAMPLES BEING HELD red     COMMENT        Add-on orders for these samples will be processed based on acceptable specimen integrity and analyte stability, which may vary by analyte. CBC WITH AUTOMATED DIFF    Collection Time: 04/03/20  1:28 AM   Result Value Ref Range    WBC 10.3 3.6 - 11.0 K/uL    RBC 4.59 3.80 - 5.20 M/uL    HGB 14.6 11.5 - 16.0 g/dL    HCT 44.0 35.0 - 47.0 %    MCV 95.9 80.0 - 99.0 FL    MCH 31.8 26.0 - 34.0 PG    MCHC 33.2 30.0 - 36.5 g/dL    RDW 12.5 11.5 - 14.5 %    PLATELET 845 069 - 450 K/uL    MPV 9.5 8.9 - 12.9 FL    NRBC 0.0 0  WBC    ABSOLUTE NRBC 0.00 0.00 - 0.01 K/uL    NEUTROPHILS 51 32 - 75 %    LYMPHOCYTES 36 12 - 49 %    MONOCYTES 9 5 - 13 %    EOSINOPHILS 3 0 - 7 %    BASOPHILS 1 0 - 1 %    IMMATURE GRANULOCYTES 0 0.0 - 0.5 %    ABS. NEUTROPHILS 5.3 1.8 - 8.0 K/UL    ABS. LYMPHOCYTES 3.7 (H) 0.8 - 3.5 K/UL    ABS. MONOCYTES 0.9 0.0 - 1.0 K/UL    ABS. EOSINOPHILS 0.3 0.0 - 0.4 K/UL    ABS. BASOPHILS 0.1 0.0 - 0.1 K/UL    ABS. IMM.  GRANS. 0.0 0.00 - 0.04 K/UL    DF AUTOMATED     METABOLIC PANEL, COMPREHENSIVE    Collection Time: 04/03/20  1:28 AM   Result Value Ref Range    Sodium 138 136 - 145 mmol/L    Potassium 3.8 3.5 - 5.1 mmol/L    Chloride 104 97 - 108 mmol/L    CO2 29 21 - 32 mmol/L    Anion gap 5 5 - 15 mmol/L    Glucose 130 (H) 65 - 100 mg/dL    BUN 11 6 - 20 MG/DL    Creatinine 0.68 0.55 - 1.02 MG/DL    BUN/Creatinine ratio 16 12 - 20      GFR est AA >60 >60 ml/min/1.73m2    GFR est non-AA >60 >60 ml/min/1.73m2    Calcium 9.5 8.5 - 10.1 MG/DL    Bilirubin, total 0.3 0.2 - 1.0 MG/DL    ALT (SGPT) 30 12 - 78 U/L    AST (SGOT) 21 15 - 37 U/L    Alk. phosphatase 138 (H) 45 - 117 U/L    Protein, total 8.3 (H) 6.4 - 8.2 g/dL    Albumin 3.8 3.5 - 5.0 g/dL    Globulin 4.5 (H) 2.0 - 4.0 g/dL    A-G Ratio 0.8 (L) 1.1 - 2.2     TROPONIN I    Collection Time: 04/03/20  1:28 AM   Result Value Ref Range    Troponin-I, Qt. 0.27 (H) <0.05 ng/mL   MAGNESIUM    Collection Time: 04/03/20  1:28 AM   Result Value Ref Range    Magnesium 2.3 1.6 - 2.4 mg/dL   TSH 3RD GENERATION    Collection Time: 04/03/20  1:28 AM   Result Value Ref Range    TSH 4.90 (H) 0.36 - 3.74 uIU/mL   D DIMER    Collection Time: 04/03/20  1:54 AM   Result Value Ref Range    D-dimer <0.19 0.00 - 0.65 mg/L FEU   TROPONIN I    Collection Time: 04/03/20  7:44 AM   Result Value Ref Range    Troponin-I, Qt. 0.36 (H) <0.05 ng/mL   ECHO ADULT COMPLETE    Collection Time: 04/03/20 11:50 AM   Result Value Ref Range    LA Volume 38.30 22 - 52 mL    LV E' Lateral Velocity 12.44 cm/s    LV E' Septal Velocity 8.78 cm/s    Tapse 3.41 (A) 1.5 - 2.0 cm    Ao Root D 3.04 cm       Intake/Output Summary (Last 24 hours) at 4/3/2020 1237  Last data filed at 4/3/2020 0748  Gross per 24 hour   Intake 0 ml   Output 400 ml   Net -400 ml      Patient Vitals for the past 24 hrs:   Temp Pulse Resp BP SpO2   04/03/20 1109 97.6 °F (36.4 °C) (!) 53 18 (!) 153/97 99 %   04/03/20 0748 97.9 °F (36.6 °C) 64 18 142/82 98 %   04/03/20 0307 98.4 °F (36.9 °C) 63 17 172/83 99 %   04/03/20 0210 98.1 °F (36.7 °C) 64 17 155/83 100 %   04/03/20 0118 98.1 °F (36.7 °C) 78 16 (!) 185/96 100 %    EXAM to be updated by Dr. Nidia Johns:  General:    Alert, cooperative, no distress. Psychiatric:    Normal Mood and affect    Eye/ENT:      Pupils equal, No asymmetry, Conjunctival pink. Able to hear voice at normal amplitude   Lungs:      Visibly symmetric chest expansion, No palpable tenderness. Clear to auscultation bilaterally. Heart[de-identified]    Regular rate and rhythm, S1, S2 normal, no murmur, click, rub or gallop. No JVD, Normal palpable peripheral pulses. No cyanosis   Abdomen:     Soft, non-tender. Bowel sounds normal. No masses,  No      organomegaly. Extremities:   Extremities normal, atraumatic, no edema. Neurologic:    No gross focal deficits         Rosa Colón NP  4/3/2020   12:37 PM     [facility] Office:   [facility] Office:  [address] [place] [address] Dr    [place] [address] [facility]  [address]     [address] [place] [address] Nw  P: [phone]    P: [phone]  F: [phone]    F: [phone]

## 2020-04-03 NOTE — ROUTINE PROCESS
TRANSFER - OUT REPORT:    Verbal report given to Desirae Magallontere (name) on [facility]  being transferred to  [facility] (unit) for routine progression of care       Report consisted of patients Situation, Background, Assessment and   Recommendations(SBAR). Information from the following report(s) SBAR, ED Summary and Recent Results was reviewed with the receiving nurse. Lines:   Peripheral IV 04/03/20 Right Antecubital (Active)   Site Assessment Clean, dry, & intact 4/3/2020  1:31 AM        Opportunity for questions and clarification was provided.       Patient transported with:   Monitor  Registered Nurse

## 2020-04-03 NOTE — PROGRESS NOTES
[address] [facility] visit. Unable to talk with Mrs. Marielos Haeys because nurse was with her. Prayer offered for her well-being.     LETI Lui, RN, ACSW, LCSW   Page:  [phone]

## 2020-04-03 NOTE — H&P
HISTORY AND PHYSICAL  Bhavya Quintero MD        PCP: Arpit Hernandez MD    Please note that this dictation was completed with Swank, the computer voice recognition software. Quite often unanticipated grammatical, syntax, homophones, and other interpretive errors are inadvertently transcribed by the computer software. Please disregard these errors. Please excuse any errors that have escaped final proofreading. CHIEF COMPLAINTS   Racing of the heart        HISTORY OF PRESENT ILLNESS  Patient is 79-year-old female with a history of hyperlipidemia, hypertension, hypothyroid and GERD who presents to the emergency department  with chief complaint of racing of the heart. Patient reports symptoms started last week , occurs intermittently , get worse since  yesterday  and was associated with nausea and shortness of breath. She denies any fever , chills, vomiting, chest pain, cough, lightheadedness or dizziness, urinary or bowel complaints. Of note, she was evaluated by cardiology for similar symptoms in 2012 , Holter monitor was unremarkable. Normal exercise stress echo in 4/2010 , LVEF:55 % to 60 % in 2012. The emergency department, V/S were unremarkable. Lab work-ups: Leukocytosis or lactic acidosis , BMP unremarkable, troponin 0 0. 11. EKG: NSR , chest x-ray with no acute cardiopulmonary disease.        PMH/PSH:  Past Medical History:   Diagnosis Date    GERD (gastroesophageal reflux disease)     Hypercholesterolemia     Hyperlipidemia 5/26/2011    Hypertension     Hypothyroid     Microscopic hematuria 1/2011    - Dr Soo Powell, w/u negative    Other ill-defined conditions(799.89)     states may have sleep apnea but never tested     Past Surgical History:   Procedure Laterality Date    HX CHOLECYSTECTOMY  6/2/11    HX COLONOSCOPY  6/21/12    HX HERNIA REPAIR      HX HYSTERECTOMY  2003    for fibroids, cervix is present       Home meds:   Prior to Admission medications    Medication Sig Start Date End Date Taking? Authorizing Provider   levothyroxine (SYNTHROID) 112 mcg tablet Take  by mouth. Take 1 tab daily except 1/2 tab on Sunday. Provider, Historical   hydroCHLOROthiazide (HYDRODIURIL) 12.5 mg tablet Take 1 Tab by mouth daily. 1/17/20   Mariam Houser MD   famotidine (PEPCID) 40 mg tablet Take 1 Tab by mouth daily. 1/17/20   Mariam Houser MD   metoprolol tartrate (LOPRESSOR) 50 mg tablet TAKE ONE TABLET BY MOUTH TWICE A DAY 7/11/19   Mariam Houser MD   cyanocobalamin (VITAMIN B-12) 1,000 mcg tablet Take 1,000 mcg by mouth every other day. Provider, Historical   acetaminophen (TYLENOL) 325 mg tablet Take  by mouth every four (4) hours as needed for Pain. Provider, Historical   aspirin 81 mg tablet Take 1 Tab by mouth daily. 11/18/11   Mariam Houser MD   CALCIUM CARBONATE (CALCIUM 600 PO) Take 1 Tab by mouth two (2) times a day. Provider, Historical   MULTIVITAMIN PO Take 1 Tab by mouth daily. 5/31/11   Provider, Historical       Allergies: Allergies   Allergen Reactions    Lisinopril Cough    Simvastatin Myalgia       FH:  Family History   Problem Relation Age of Onset    Other Mother         uterine fibroids    Post-op Nausea/Vomiting Mother     High Cholesterol Father     Coronary Artery Disease Father        SH:  Social History     Tobacco Use    Smoking status: Never Smoker    Smokeless tobacco: Never Used   Substance Use Topics    Alcohol use: No       ROS: A comprehensive review of systems was negative except for that written in the HPI. PHYSICAL EXAM:  Visit Vitals  /83 (BP 1 Location: Left arm)   Pulse 64   Temp 98.1 °F (36.7 °C)   Resp 17   LMP 01/01/2003   SpO2 100%       General:          Alert, cooperative, no distress, appears stated age.      HEENT:           Atraumatic, anicteric sclerae, pink conjunctivae                          No oral ulcers, mucosa moist, throat clear, dentition fair  Neck: Supple, symmetrical,  thyroid: non tender  Lungs:             Clear to auscultation bilaterally. No Wheezing or Rhonchi. No rales. Chest wall:      No tenderness  No Accessory muscle use. Heart:              Regular  rhythm,  No  murmur   No edema  Abdomen:        Soft, non-tender. Not distended. Bowel sounds normal  Extremities:     No cyanosis. No clubbing,                            Skin turgor normal, Capillary refill normal, Radial dial pulse 2+  Skin:                Not pale. Not Jaundiced  No rashes   Psych:             Not anxious or agitated. Neurologic:     Alert and oriented to PPT, CNII-XII intact. Motor and sensory exam grossly intact. Labs/Imaging:  Recent Results (from the past 24 hour(s))   EKG, 12 LEAD, INITIAL    Collection Time: 04/03/20  1:20 AM   Result Value Ref Range    Ventricular Rate 71 BPM    Atrial Rate 71 BPM    P-R Interval 144 ms    QRS Duration 94 ms    Q-T Interval 402 ms    QTC Calculation (Bezet) 436 ms    Calculated P Axis 50 degrees    Calculated R Axis 51 degrees    Calculated T Axis 47 degrees    Diagnosis       Normal sinus rhythm  When compared with ECG of 31-MAY-2011 15:29,  No significant change was found     SAMPLES BEING HELD    Collection Time: 04/03/20  1:28 AM   Result Value Ref Range    SAMPLES BEING HELD red     COMMENT        Add-on orders for these samples will be processed based on acceptable specimen integrity and analyte stability, which may vary by analyte.    CBC WITH AUTOMATED DIFF    Collection Time: 04/03/20  1:28 AM   Result Value Ref Range    WBC 10.3 3.6 - 11.0 K/uL    RBC 4.59 3.80 - 5.20 M/uL    HGB 14.6 11.5 - 16.0 g/dL    HCT 44.0 35.0 - 47.0 %    MCV 95.9 80.0 - 99.0 FL    MCH 31.8 26.0 - 34.0 PG    MCHC 33.2 30.0 - 36.5 g/dL    RDW 12.5 11.5 - 14.5 %    PLATELET 841 156 - 466 K/uL    MPV 9.5 8.9 - 12.9 FL    NRBC 0.0 0  WBC    ABSOLUTE NRBC 0.00 0.00 - 0.01 K/uL    NEUTROPHILS 51 32 - 75 %    LYMPHOCYTES 36 12 - 49 %    MONOCYTES 9 5 - 13 %    EOSINOPHILS 3 0 - 7 %    BASOPHILS 1 0 - 1 %    IMMATURE GRANULOCYTES 0 0.0 - 0.5 %    ABS. NEUTROPHILS 5.3 1.8 - 8.0 K/UL    ABS. LYMPHOCYTES 3.7 (H) 0.8 - 3.5 K/UL    ABS. MONOCYTES 0.9 0.0 - 1.0 K/UL    ABS. EOSINOPHILS 0.3 0.0 - 0.4 K/UL    ABS. BASOPHILS 0.1 0.0 - 0.1 K/UL    ABS. IMM. GRANS. 0.0 0.00 - 0.04 K/UL    DF AUTOMATED     METABOLIC PANEL, COMPREHENSIVE    Collection Time: 04/03/20  1:28 AM   Result Value Ref Range    Sodium 138 136 - 145 mmol/L    Potassium 3.8 3.5 - 5.1 mmol/L    Chloride 104 97 - 108 mmol/L    CO2 29 21 - 32 mmol/L    Anion gap 5 5 - 15 mmol/L    Glucose 130 (H) 65 - 100 mg/dL    BUN 11 6 - 20 MG/DL    Creatinine 0.68 0.55 - 1.02 MG/DL    BUN/Creatinine ratio 16 12 - 20      GFR est AA >60 >60 ml/min/1.73m2    GFR est non-AA >60 >60 ml/min/1.73m2    Calcium 9.5 8.5 - 10.1 MG/DL    Bilirubin, total 0.3 0.2 - 1.0 MG/DL    ALT (SGPT) 30 12 - 78 U/L    AST (SGOT) 21 15 - 37 U/L    Alk.  phosphatase 138 (H) 45 - 117 U/L    Protein, total 8.3 (H) 6.4 - 8.2 g/dL    Albumin 3.8 3.5 - 5.0 g/dL    Globulin 4.5 (H) 2.0 - 4.0 g/dL    A-G Ratio 0.8 (L) 1.1 - 2.2     TROPONIN I    Collection Time: 04/03/20  1:28 AM   Result Value Ref Range    Troponin-I, Qt. 0.27 (H) <0.05 ng/mL   MAGNESIUM    Collection Time: 04/03/20  1:28 AM   Result Value Ref Range    Magnesium 2.3 1.6 - 2.4 mg/dL   TSH 3RD GENERATION    Collection Time: 04/03/20  1:28 AM   Result Value Ref Range    TSH 4.90 (H) 0.36 - 3.74 uIU/mL   D DIMER    Collection Time: 04/03/20  1:54 AM   Result Value Ref Range    D-dimer <0.19 0.00 - 0.65 mg/L FEU       Recent Labs     04/03/20  0128   WBC 10.3   HGB 14.6   HCT 44.0        Recent Labs     04/03/20  0128      K 3.8      CO2 29   BUN 11   CREA 0.68   *   CA 9.5   MG 2.3     Recent Labs     04/03/20  0128   SGOT 21   ALT 30   *   TBILI 0.3   TP 8.3*   ALB 3.8   GLOB 4.5*       Recent Labs     04/03/20  0128   TROIQ 0.27*       No results for input(s): INR, PTP, APTT, INREXT in the last 72 hours. No results for input(s): PH, PCO2, PO2 in the last 72 hours. XR CHEST PORT  Narrative: EXAM: XR CHEST PORT    INDICATION: palpitations    COMPARISON: 9.5.2013    FINDINGS: A portable AP radiograph of the chest was obtained at 0129 hours. The  patient is on a cardiac monitor. The lungs are clear. The cardiac and  mediastinal contours and pulmonary vascularity are normal.  The bones and soft  tissues are grossly within normal limits. Impression: IMPRESSION: Normal chest.          Assessment & Plan:  =====================  Palpitation  Elevated troponin , Demand type  VS r/oACS   EKG NSR ,unremarkable for acute ischemia  Trend serial troponins  Check TSH   Chest x-ray revealed no acute cardiopulmonary process  Received  in the ED  Continue home BB  N.p.o. for now  Cardiac consult    Hypertension  Stable  continue home meds    Hypothyroidism  Check TSH  Continue home meds    Hyperlipidemia   ? Reported intolerance to statins     GERD  Stable  Continue home famotidine    Patient's Baseline: Ambulates with walking  DVT ppx: Lovenox   Code status: Full   Disposition: TBD                 Signed By: Lowell Sampson MD     April 3, 2020

## 2020-04-03 NOTE — PROGRESS NOTES
0700: patient had an uneventful shift. 0730: Bedside and Verbal shift change report given to Sami Carrera (oncoming nurse) by Shania Hernandez (offgoing nurse). Report included the following information SBAR, Kardex, ED Summary, Intake/Output, MAR, Recent Results and Cardiac Rhythm NSR. Problem: Falls - Risk of  Goal: *Absence of Falls  Description: Document Arnold Kevin Fall Risk and appropriate interventions in the flowsheet. Outcome: Progressing Towards Goal  Note: Fall Risk Interventions:     Medication Interventions: Patient to call before getting OOB, Teach patient to arise slowly     Call bell and personal effects within reach. Bed locked and in low position. Non skid footwear in place. Problem: Pressure Injury - Risk of  Goal: *Prevention of pressure injury  Description: Document Johan Scale and appropriate interventions in the flowsheet.   Outcome: Progressing Towards Goal  Note: Pressure Injury Interventions:     Turns self at appropriate intervals; skin assessed Q4H; blood glucose controlled           Problem: Arrhythmia Pathway (Adult)  Goal: *Absence of arrhythmia  Outcome: Progressing Towards Goal

## 2020-04-03 NOTE — DISCHARGE SUMMARY
Discharge Summary       PATIENT ID: Santhosh Iglesias  MRN: [id number]   YOB: 1955    DATE OF ADMISSION: 4/3/2020  1:15 AM    DATE OF DISCHARGE: 4/3/2020   PRIMARY CARE PROVIDER: Hanh Rivas MD     ATTENDING PHYSICIAN: Lindsey ePreyra   DISCHARGING PROVIDER: Kaelyn Joyner MD    To contact this individual call [phone] and ask the  to page. If unavailable ask to be transferred the Adult Hospitalist Department. CONSULTATIONS: IP CONSULT TO CARDIOLOGY    PROCEDURES/SURGERIES: Procedure(s):  LEFT HEART CATH / CORONARY ANGIOGRAPHY    ADMITTING 01 [facility] COURSE:     Patient is 22-year-old female with a history of hyperlipidemia, hypertension, hypothyroid and GERD who presents to the emergency department  with chief complaint of racing of the heart. Patient reports symptoms started last week , occurs intermittently , get worse since  yesterday  and was associated with nausea and shortness of breath. She denies any fever , chills, vomiting, chest pain, cough, lightheadedness or dizziness, urinary or bowel complaints.      Of note, she was evaluated by cardiology for similar symptoms in 2012 , Holter monitor was unremarkable. Normal exercise stress echo in 4/2010 , LVEF:55 % to 60 % in 2012.     The emergency department, V/S were unremarkable. Lab work-ups: Leukocytosis or lactic acidosis , BMP unremarkable, troponin 0 0. 11. EKG: NSR , chest x-ray with no acute cardiopulmonary disease. Palpitation  Elevated troponin , Demand type  VS r/oACS   EKG NSR ,unremarkable for acute ischemia  Trend serial troponins  Check TSH   Chest x-ray revealed no acute cardiopulmonary process  Received  in the ED       Hypertension  Stable  continue home meds     Hypothyroidism  Check TSH  Continue home meds     Hyperlipidemia   ? Reported intolerance to statins      GERD  Stable  Continue home famotidine        Per [facility]     1)Minor coronary disease--non obstructive with no angiographic evidence of acute plaque rupture  2)NOrmal LVEDP      Recommendation  1)Primary prevention for CAD  2)Trop elevation may be related to arrhythmia      Follow up with pcp     DISCHARGE DIAGNOSES / PLAN:      Above      ADDITIONAL CARE RECOMMENDATIONS:   Please follow up with pcp     PENDING TEST RESULTS:   At the time of discharge the following test results are still pending:     FOLLOW UP APPOINTMENTS:    Follow-up Information     Follow up With Specialties Details Why Contact Info    Trung Savage MD Internal Medicine In 1 week  [facility]  [address] [facility] [address]  [facility] [address] [facility]      Debo Velazquez MD Cardiology In 1 week  [address] [facility] [address]  [facility] 7 [phone]               DIET: Regular Diet  Oral Nutritional Supplements:    ACTIVITY: Activity as tolerated    WOUND CARE:     EQUIPMENT needed:       DISCHARGE MEDICATIONS:  Current Discharge Medication List      CONTINUE these medications which have NOT CHANGED    Details   !! levothyroxine (SYNTHROID) 112 mcg tablet Take 56 mcg by mouth every Sunday. Take 1 tab daily except 1/2 tab on Sunday. !! levothyroxine (SYNTHROID) 112 mcg tablet Take 112 mcg by mouth six (6) days a week. Take 1 tab daily except 1/2 tab on Sunday. hydroCHLOROthiazide (HYDRODIURIL) 12.5 mg tablet Take 1 Tab by mouth daily. Qty: 90 Tab, Refills: 0    Associated Diagnoses: Hypertension, essential      famotidine (PEPCID) 40 mg tablet Take 1 Tab by mouth daily. Qty: 30 Tab, Refills: 0    Associated Diagnoses: Gastroesophageal reflux disease without esophagitis      metoprolol tartrate (LOPRESSOR) 50 mg tablet TAKE ONE TABLET BY MOUTH TWICE A DAY  Qty: 180 Tab, Refills: 1    Associated Diagnoses: Hypertension, essential; Atrial tachycardia (HCC)      cyanocobalamin (VITAMIN B-12) 1,000 mcg tablet Take 1,000 mcg by mouth every other day.       acetaminophen (TYLENOL) 325 mg tablet Take  by mouth every four (4) hours as needed for Pain. aspirin 81 mg tablet Take 1 Tab by mouth daily. CALCIUM CARBONATE (CALCIUM 600 PO) Take 1 Tab by mouth two (2) times a day. multivitamin (ONE A DAY) tablet Take 1 Tab by mouth daily. !! - Potential duplicate medications found. Please discuss with provider. NOTIFY YOUR PHYSICIAN FOR ANY OF THE FOLLOWING:   Fever over 101 degrees for 24 hours. Chest pain, shortness of breath, fever, chills, nausea, vomiting, diarrhea, change in mentation, falling, weakness, bleeding. Severe pain or pain not relieved by medications. Or, any other signs or symptoms that you may have questions about. DISPOSITION:   xx Home With:   OT  PT  HH  RN       Long term SNF/Inpatient Rehab    Independent/assisted living    Hospice    Other:       PATIENT CONDITION AT DISCHARGE:     Functional status    Poor     Deconditioned    x Independent      Cognition    x Lucid     Forgetful     Dementia      Catheters/lines (plus indication)    [place]     PICC     PEG    x None      Code status   x  Full code     DNR      PHYSICAL EXAMINATION AT DISCHARGE:  General:          Alert, cooperative, no distress, appears stated age. HEENT:           Atraumatic, anicteric sclerae, pink conjunctivae                          No oral ulcers, mucosa moist, throat clear, dentition fair  Neck:               Supple, symmetrical  Lungs:             Clear to auscultation bilaterally. No Wheezing or Rhonchi. No rales. Chest wall:      No tenderness  No Accessory muscle use. Heart:              Regular  rhythm,  No  murmur   No edema  Abdomen:        Soft, non-tender. Not distended. Bowel sounds normal  Extremities:     No cyanosis. No clubbing,                            Skin turgor normal, Capillary refill normal  Skin:                Not pale. Not Jaundiced  No rashes   Psych:             Not anxious or agitated.   Neurologic:      Alert, moves all extremities, answers questions appropriately and responds to commands       CHRONIC MEDICAL DIAGNOSES:  Problem List as of 4/3/2020 Date Reviewed: 1/17/2020          Codes Class Noted - Resolved    Palpitation ICD-10-CM: R00.2  ICD-9-CM: 785.1  4/3/2020 - Present        Elevated troponin ICD-10-CM: R79.89  ICD-9-CM: 790.6  4/3/2020 - Present        Abnormal mammogram of right breast ICD-10-CM: R92.8  ICD-9-CM: 793.80  9/19/2017 - Present        Atrial tachycardia ([address] ICD-10-CM: I47.1  ICD-9-CM: 427.89  11/9/2012 - Present        GERD (gastroesophageal reflux disease) ICD-10-CM: K21.9  ICD-9-CM: 530.81  Unknown - Present        Microscopic hematuria ICD-10-CM: R31.29  ICD-9-CM: 599.72  1/1/2011 - Present    Overview Signed 1/21/2011  7:27 AM by Virginia Conley MD     - Dr Laly De La Torre, w/u negative             Hypertension, essential ICD-10-CM: I10  ICD-9-CM: 401.9  Unknown - Present    Overview Signed 1/17/2020 10:08 AM by Virginia Conley MD     Did not tolerate lisinopril (cough), amlodipine (? Swelling), HCTZ (25mg due to dizziness, okay on 12.5m)             Hypothyroid ICD-10-CM: E03.9  ICD-9-CM: 244.9  9/23/2010 - Present        RESOLVED: Palpitations ICD-10-CM: R00.2  ICD-9-CM: 785.1  10/11/2012 - 9/19/2017        RESOLVED: Dizziness ICD-10-CM: R42  ICD-9-CM: 780.4  10/11/2012 - 8/27/2015        RESOLVED: Urinary frequency ICD-10-CM: R35.0  ICD-9-CM: 788.41  10/4/2012 - 5/3/2013        RESOLVED: Status post laparoscopic cholecystectomy ICD-10-CM: Z90.49  ICD-9-CM: V45.89  Unknown - 10/4/2012        RESOLVED: Hyperlipidemia ICD-10-CM: E78.5  ICD-9-CM: 272.4  5/26/2011 - 10/28/2016              Greater than 30  minutes were spent with the patient on counseling and coordination of care    Signed:   Eduardo Foster MD  4/3/2020  3:56 PM

## 2020-04-03 NOTE — PROGRESS NOTES
Admission Medication Reconciliation:    Information obtained from:  patient, chart review, internal medicine follow-up visit notes  RxQuery data available¹:  NO    Comments/Recommendations: Updated PTA meds/reviewed patient's allergies. 1)  Refill history not available. 2)  Medication changes (since last review): Added  - none    Adjusted  - levothyroxine from 112 mcg daily to 112 mcg daily EXCEPT 56 mcg on Sundays    Removed  - none       ¹RxQuery pharmacy benefit data reflects medications filled and processed through the patient's insurance, however   this data does NOT capture whether the medication was picked up or is currently being taken by the patient. Allergies:  Lisinopril and Simvastatin    Significant PMH/Disease States:   Past Medical History:   Diagnosis Date    GERD (gastroesophageal reflux disease)     Hypercholesterolemia     Hyperlipidemia 5/26/2011    Hypertension     Hypothyroid     Microscopic hematuria 1/2011    - Dr Soo Powell, w/u negative    Other ill-defined conditions(799.89)     states may have sleep apnea but never tested     Chief Complaint for this Admission:    Chief Complaint   Patient presents with    Rapid Heart Rate     Prior to Admission Medications:   Prior to Admission Medications   Prescriptions Last Dose Informant Taking? CALCIUM CARBONATE (CALCIUM 600 PO)   Yes   Sig: Take 1 Tab by mouth two (2) times a day. acetaminophen (TYLENOL) 325 mg tablet   Yes   Sig: Take  by mouth every four (4) hours as needed for Pain. aspirin 81 mg tablet   Yes   Sig: Take 1 Tab by mouth daily. cyanocobalamin (VITAMIN B-12) 1,000 mcg tablet   Yes   Sig: Take 1,000 mcg by mouth every other day. famotidine (PEPCID) 40 mg tablet   Yes   Sig: Take 1 Tab by mouth daily. hydroCHLOROthiazide (HYDRODIURIL) 12.5 mg tablet   Yes   Sig: Take 1 Tab by mouth daily. levothyroxine (SYNTHROID) 112 mcg tablet   Yes   Sig: Take 112 mcg by mouth six (6) days a week.  Take 1 tab daily except 1/2 tab on Sunday. levothyroxine (SYNTHROID) 112 mcg tablet   Yes   Sig: Take 56 mcg by mouth every Sunday. Take 1 tab daily except 1/2 tab on Sunday. metoprolol tartrate (LOPRESSOR) 50 mg tablet   Yes   Sig: TAKE ONE TABLET BY MOUTH TWICE A DAY   multivitamin (ONE A DAY) tablet   Yes   Sig: Take 1 Tab by mouth daily. Facility-Administered Medications: None       Please contact the main inpatient pharmacy with any questions or concerns at [phone] and we will direct you to the clinical pharmacist covering this patient's care while in-house.    Damaris Zhang

## 2020-04-03 NOTE — DISCHARGE INSTRUCTIONS
Discharge Instructions       PATIENT ID: Elizabeth Galvez  MRN: [id number]   YOB: 1955    DATE OF ADMISSION: 4/3/2020  1:15 AM    DATE OF DISCHARGE: 4/3/2020    PRIMARY CARE PROVIDER: Dilcia Schmidt MD     ATTENDING PHYSICIAN: Hayes Peraza MD  DISCHARGING PROVIDER: Bernarda Olguin MD    To contact this individual call [phone] and ask the  to page. If unavailable ask to be transferred the Adult Hospitalist Department. DISCHARGE DIAGNOSES   palpitations    CONSULTATIONS: IP CONSULT TO CARDIOLOGY    PROCEDURES/SURGERIES: Procedure(s):  LEFT HEART CATH / CORONARY ANGIOGRAPHY    PENDING TEST RESULTS:   At the time of discharge the following test results are still pending:     FOLLOW UP APPOINTMENTS:   Follow-up Information     Follow up With Specialties Details Why Contact Info    Dilcia Schmidt MD Internal Medicine In 1 week  [place] [address]  [address] [address] [facility] [address] Gomez MD Cardiology In 1 week  [place] [address]  [address] [address] [facility] [address] [phone]             ADDITIONAL CARE RECOMMENDATIONS:   Please follow up with Dr Carlos Cr , and talk to her to start cholesterol medication , other than simvastatin like crestor / may not have side effects like myalgia     DIET: Regular Diet  Oral Nutritional Supplements    ACTIVITY: Activity as tolerated    WOUND CARE:     EQUIPMENT needed:       DISCHARGE MEDICATIONS:   See Medication Reconciliation Form    · It is important that you take the medication exactly as they are prescribed. · Keep your medication in the bottles provided by the pharmacist and keep a list of the medication names, dosages, and times to be taken in your wallet. · Do not take other medications without consulting your doctor. NOTIFY YOUR PHYSICIAN FOR ANY OF THE FOLLOWING:   Fever over 101 degrees for 24 hours.    Chest pain, shortness of breath, fever, chills, nausea, vomiting, diarrhea, change in mentation, falling, weakness, bleeding. Severe pain or pain not relieved by medications. Or, any other signs or symptoms that you may have questions about.       DISPOSITION:  xx  Home With:   OT  PT  HH  RN       SNF/Inpatient Rehab/LTAC    Independent/assisted living    Hospice    Other:     CDMP Checked:   Yes x     PROBLEM LIST Updated:  Yes x       Signed:   Kaelyn Joyner MD  4/3/2020  3:47 PM

## 2020-04-06 ENCOUNTER — TELEPHONE (OUTPATIENT)
Dept: CARDIOLOGY CLINIC | Age: 65
End: 2020-04-06

## 2020-04-06 NOTE — TELEPHONE ENCOUNTER
----- Message from Sherri Deleon MD sent at 4/4/2020 10:15 AM EDT -----    Needs fu with ashwini chew for EP    LVM for patient to return call at earliest convenience. Appt has been scheduled for Janette Gillespie MD for April 20, 2020 @ 1:15 pm.   [address] [place] [address] [address]. [address] [place] [address], [address] [place] [address]  [phone]      Patient returned call, 2 pt identifiers used    Above information given.

## 2020-04-20 ENCOUNTER — PATIENT MESSAGE (OUTPATIENT)
Dept: INTERNAL MEDICINE CLINIC | Age: 65
End: 2020-04-20

## 2020-04-21 PROBLEM — R77.8 ELEVATED TROPONIN: Status: RESOLVED | Noted: 2020-04-03 | Resolved: 2020-04-21

## 2020-04-21 NOTE — TELEPHONE ENCOUNTER
Call to patient, identified with 2 identifiers. Missed appointment with Dr. Arlene Cervantes this morning, she rescheduled with Tanisha Robert NP, same day. She was being seen for hospital follow up, saw cardiologist yesterday and got confused about her follow up with Dr. Arlene Cervantes. She is not currently having any issues and is okay to wait to see Dr. Arlene Cervantes. Today's appointment with Stephanie Pratt was cancelled and rescheduled with Dr. Arlene Cervantes for Friday 04/24/20 at 11 a.m. Both providers notified.

## 2020-04-21 NOTE — TELEPHONE ENCOUNTER
----- Message from Shan Tobar MD sent at 4/21/2020  1:53 PM EDT -----  Unless she is having issues that need for her to be seen for today, I would prefer to see her. We can put her on my schedule for later this week or next week. This is for a hospital f/u from April 3rd. Was supposed to see [facility] yesterday. I attached my precharting note, in case she wants to be seen. If it sounds like to Seema Avila like this is all anxiety driven then defer treatment to me. Tell her you will talk to me and we will call her back with a plan either today or tomorrow. Follow Up  María Elena Dai is seen for follow up from recent admission to [address] [place] [address] on 4/3. We reviewed the records. She presented with palpitations. Work up was negative, except for slight increase in troponin attributed to arrhythmia. TTE normal.  Cardiac cath w/ mild disease, no intervention. Her medications were not adjusted. She has a similar issue in '12. Holter relatively normal, TTE normal at that time.     She had f/u with Dr Filemon Rodgers at [facility] yesterday.     There was a question about her needing a statin. Chart reviewed was on simvastatin in the past but stopped due to myalgias. Was on pravastatin briefly, but no documentation of any issues.          She has a history of hypothyroidism. Since last visit: TSH checked while admitted and was slightly high (4.9). Previously was 0.371, decreased from 1 tab daily to 1 tab 6 days/wk, increased to 1.77, and now is at 4.9. Medications were not adjusted in the hospital.      ----- Message -----  From: Vivian Liu RN  Sent: 4/21/2020  12:39 PM EDT  To: Laura Torres NP, Shan Tobar MD    Kristi missed her appointment this morning and has since been put on Abbie's schedule at 2:40. She was asking me about her, if anxiety could be a part of it; has cardiologist; wasn't sure what she could do with her virtually. Any recommendations/suggestions?

## 2020-04-24 ENCOUNTER — VIRTUAL VISIT (OUTPATIENT)
Dept: INTERNAL MEDICINE CLINIC | Age: 65
End: 2020-04-24

## 2020-04-24 DIAGNOSIS — E78.00 PURE HYPERCHOLESTEROLEMIA: ICD-10-CM

## 2020-04-24 DIAGNOSIS — I47.1 ATRIAL TACHYCARDIA (HCC): Primary | ICD-10-CM

## 2020-04-24 DIAGNOSIS — I10 HYPERTENSION, ESSENTIAL: ICD-10-CM

## 2020-04-24 DIAGNOSIS — E03.4 HYPOTHYROIDISM DUE TO ACQUIRED ATROPHY OF THYROID: ICD-10-CM

## 2020-04-24 DIAGNOSIS — F51.01 PRIMARY INSOMNIA: ICD-10-CM

## 2020-04-24 RX ORDER — LOVASTATIN 20 MG/1
20 TABLET ORAL
Qty: 30 TAB | Refills: 0 | Status: SHIPPED | OUTPATIENT
Start: 2020-04-24 | End: 2021-01-12

## 2020-04-24 RX ORDER — AMLODIPINE BESYLATE 5 MG/1
5 TABLET ORAL DAILY
COMMUNITY
End: 2022-10-14 | Stop reason: SDUPTHER

## 2020-04-24 RX ORDER — METOPROLOL TARTRATE 50 MG/1
TABLET ORAL
Qty: 180 TAB | Refills: 1 | Status: SHIPPED | OUTPATIENT
Start: 2020-04-24 | End: 2021-03-05 | Stop reason: SDUPTHER

## 2020-04-24 RX ORDER — TRAZODONE HYDROCHLORIDE 100 MG/1
50-100 TABLET ORAL
Qty: 30 TAB | Refills: 1 | Status: SHIPPED | OUTPATIENT
Start: 2020-04-24 | End: 2020-09-04

## 2020-04-24 NOTE — PROGRESS NOTES
Sean Esquivel is a 59 y.o. female who was seen by synchronous (real-time) audio-video technology on 4/24/2020. She had a visit on 4/21/20 but she missed it. She confirmed that, for purposes of billing, this is a virtual visit with her provider for which we will submit a claim for reimbursement to her insurance company. She is aware that she will be responsible for any copays, coinsurance amounts or other amounts not covered by her insurance company. Do you accept - YES    This visit was completed was completed virtually using [employer]      Assessment & Plan:   Diagnoses and all orders for this visit:    1. Atrial tachycardia ([address]- resolved, asymptomatic, continue on BB. Defer to specialist.  -     metoprolol tartrate (LOPRESSOR) 50 mg tablet; TAKE ONE TABLET BY MOUTH TWICE A DAY    2. Primary insomnia- acute on chronic issue, slightly worse, no overt depression or anxiety, sounds like stress. Reviewed OTC and Rx options, will try meds below. -     traZODone (DESYREL) 100 mg tablet; Take 0.5-1 Tabs by mouth nightly as needed for Sleep. 3. Hypertension, essential- unclear control, high in cardiology office and started on amlodipine, will defer to him. She was on this in the past, unclear why stopped but I believe edema. Did not tolerate lisinopril (cough) and higher doses of HCTZ (dizziness). -     metoprolol tartrate (LOPRESSOR) 50 mg tablet; TAKE ONE TABLET BY MOUTH TWICE A DAY    4. Pure hypercholesterolemia- uncontrolled, due to cost will start on meds below, has had myalgias w/ simvastatin. Was on pravastatin previously but not sure if stopped due to side effects or cost.  -     lovastatin (MEVACOR) 20 mg tablet; Take 1 Tab by mouth nightly. 5. Hypothyroid- asymptomatic, TSH high, dose was adjusted previously due to low TSH. Will not make any changes and repeat at f/u. Follow-up and Dispositions    · Return in about 3 months (around 7/24/2020).          Subjective:   Kristi Fraser Kelley Fowler was seen for Hospital Follow Up    Follow Up  Sean Esquivel is seen for follow up from recent admission to [facility] on 4/3. We reviewed the records. She presented with palpitations. Work up was negative, except for slight increase in troponin attributed to arrhythmia. TTE normal.  Cardiac cath w/ mild disease, no intervention. Her medications were not adjusted. She has a similar issue in '12. Holter relatively normal, TTE normal at that time.     She had f/u with Dr Elida Lam at [facility] on 4/20. Note reviewed. He started her on amlodipine. She was on this in the past and did have some slight swelling, nothing at this time. Chart reviewed was on simvastatin in the past but stopped due to myalgias. Was on pravastatin briefly, but no documentation of any issues. She reports symptoms are resolved and have no retuned.        Endocrine Review  Patient is seen for followup of hypothyroidism. Since last visit: TSH checked while admitted and was slightly high (4.9). Previously was 0.371, decreased from 1 tab daily to 1 tab 6 days/wk, increased to 1.77, and now is at 4.9. She reports medication compliance: all the time and is taking separate from all other meds. She reports the following concerns/problems/med side effects: none. Lab review: labs reviewed and discussed with patient. PMH, PSH, Meds, and Allergies reviewed. Prior to Admission medications    Medication Sig Start Date End Date Taking? Authorizing Provider   levothyroxine (SYNTHROID) 112 mcg tablet Take 112 mcg by mouth six (6) days a week. Take 1 tab daily except 1/2 tab on Sunday. Yes Provider, Historical   hydroCHLOROthiazide (HYDRODIURIL) 12.5 mg tablet Take 1 Tab by mouth daily. 1/17/20  Yes Jolanta Adorno MD   famotidine (PEPCID) 40 mg tablet Take 1 Tab by mouth daily.  1/17/20  Yes Jolanta Adorno MD   metoprolol tartrate (LOPRESSOR) 50 mg tablet TAKE ONE TABLET BY MOUTH TWICE A DAY 7/11/19  Yes July Rodriguez MD   cyanocobalamin (VITAMIN B-12) 1,000 mcg tablet Take 1,000 mcg by mouth every other day. Yes Provider, Historical   acetaminophen (TYLENOL) 325 mg tablet Take  by mouth every four (4) hours as needed for Pain. Yes Provider, Historical   aspirin 81 mg tablet Take 1 Tab by mouth daily. 11/18/11  Yes July Rodriguez MD   CALCIUM CARBONATE (CALCIUM 600 PO) Take 1 Tab by mouth two (2) times a day. Yes Provider, Historical   multivitamin (ONE A DAY) tablet Take 1 Tab by mouth daily. 5/31/11  Yes Provider, Historical   levothyroxine (SYNTHROID) 112 mcg tablet Take 56 mcg by mouth every Sunday. Take 1 tab daily except 1/2 tab on Sunday. 4/24/20  Provider, Historical       Allergies   Allergen Reactions    Lisinopril Cough    Simvastatin Myalgia         Review of Systems   Constitutional: Negative for chills, fever, malaise/fatigue and weight loss. Respiratory: Negative for cough and shortness of breath. Cardiovascular: Negative for chest pain, palpitations and leg swelling. Gastrointestinal: Negative for abdominal pain, constipation, diarrhea, heartburn, nausea and vomiting. Musculoskeletal: Negative for joint pain and myalgias. Neurological: Negative for dizziness and headaches. Psychiatric/Behavioral: Negative for depression. The patient has insomnia. The patient is not nervous/anxious. Objective:     General: alert, cooperative, no distress   Mental  status: normal mood, behavior, speech, dress, motor activity, and thought processes   Eyes: normal sclera   Mouth: Covered by mouth by mask   Neck: no visualized mass   Resp: normal effort and no respiratory distress   Neuro: no gross deficits   Musculoskeletal: normal ROM of neck   Skin:    Psychiatric: normal affect         Due to this being a TeleHealth evaluation, many elements of the physical examination are unable to be assessed.      Pursuant to the emergency declaration under the [address] and the National Emergencies Act, [facility] waiver authority and the [employer] and [place] General Act, this Virtual  Visit was conducted, with patient's consent, to reduce the patient's risk of exposure to COVID-19 and provide continuity of care for an established patient. Services were provided through a video synchronous discussion virtually to substitute for in-person clinic visit. We discussed the expected course, resolution and complications of the diagnosis(es) in detail. Medication risks, benefits, costs, interactions, and alternatives were discussed as indicated. I advised her to contact the office if her condition worsens, changes or fails to improve as anticipated. She expressed understanding with the diagnosis(es) and plan.      Shan Tobar MD

## 2020-07-22 RX ORDER — LEVOTHYROXINE SODIUM 112 UG/1
TABLET ORAL
Qty: 90 TAB | Refills: 0 | Status: SHIPPED | OUTPATIENT
Start: 2020-07-22 | End: 2020-11-03 | Stop reason: SDUPTHER

## 2020-07-30 DIAGNOSIS — I10 HYPERTENSION, ESSENTIAL: ICD-10-CM

## 2020-07-31 RX ORDER — HYDROCHLOROTHIAZIDE 12.5 MG/1
12.5 TABLET ORAL DAILY
Qty: 90 TAB | Refills: 0 | Status: SHIPPED | OUTPATIENT
Start: 2020-07-31 | End: 2020-12-01

## 2020-09-04 ENCOUNTER — OFFICE VISIT (OUTPATIENT)
Dept: INTERNAL MEDICINE CLINIC | Age: 65
End: 2020-09-04

## 2020-09-04 VITALS
TEMPERATURE: 97.3 F | WEIGHT: 160 LBS | HEART RATE: 82 BPM | SYSTOLIC BLOOD PRESSURE: 138 MMHG | RESPIRATION RATE: 18 BRPM | HEIGHT: 62 IN | BODY MASS INDEX: 29.44 KG/M2 | OXYGEN SATURATION: 98 % | DIASTOLIC BLOOD PRESSURE: 86 MMHG

## 2020-09-04 DIAGNOSIS — E03.4 HYPOTHYROIDISM DUE TO ACQUIRED ATROPHY OF THYROID: Primary | ICD-10-CM

## 2020-09-04 DIAGNOSIS — Z23 NEEDS FLU SHOT: ICD-10-CM

## 2020-09-04 DIAGNOSIS — R30.0 BURNING WITH URINATION: ICD-10-CM

## 2020-09-04 DIAGNOSIS — I47.1 ATRIAL TACHYCARDIA (HCC): ICD-10-CM

## 2020-09-04 DIAGNOSIS — R30.0 DYSURIA: ICD-10-CM

## 2020-09-04 DIAGNOSIS — Z12.31 ENCOUNTER FOR SCREENING MAMMOGRAM FOR MALIGNANT NEOPLASM OF BREAST: ICD-10-CM

## 2020-09-04 DIAGNOSIS — M25.561 ACUTE PAIN OF RIGHT KNEE: ICD-10-CM

## 2020-09-04 DIAGNOSIS — Z78.0 POSTMENOPAUSAL STATE: ICD-10-CM

## 2020-09-04 DIAGNOSIS — I10 HYPERTENSION, ESSENTIAL: ICD-10-CM

## 2020-09-04 DIAGNOSIS — E78.00 PURE HYPERCHOLESTEROLEMIA: ICD-10-CM

## 2020-09-04 LAB
BILIRUB UR QL STRIP: NEGATIVE
GLUCOSE UR-MCNC: NEGATIVE MG/DL
KETONES P FAST UR STRIP-MCNC: NEGATIVE MG/DL
PH UR STRIP: 7 [PH] (ref 4.6–8)
PROT UR QL STRIP: NEGATIVE
SP GR UR STRIP: 1.01 (ref 1–1.03)
UA UROBILINOGEN AMB POC: NORMAL (ref 0.2–1)
URINALYSIS CLARITY POC: CLEAR
URINALYSIS COLOR POC: YELLOW
URINE BLOOD POC: NORMAL
URINE LEUKOCYTES POC: NEGATIVE
URINE NITRITES POC: NEGATIVE

## 2020-09-04 PROCEDURE — 90694 VACC AIIV4 NO PRSRV 0.5ML IM: CPT

## 2020-09-04 PROCEDURE — G8752 SYS BP LESS 140: HCPCS | Performed by: INTERNAL MEDICINE

## 2020-09-04 PROCEDURE — 99214 OFFICE O/P EST MOD 30 MIN: CPT | Performed by: INTERNAL MEDICINE

## 2020-09-04 PROCEDURE — G8427 DOCREV CUR MEDS BY ELIG CLIN: HCPCS | Performed by: INTERNAL MEDICINE

## 2020-09-04 PROCEDURE — G0008 ADMIN INFLUENZA VIRUS VAC: HCPCS

## 2020-09-04 PROCEDURE — 1101F PT FALLS ASSESS-DOCD LE1/YR: CPT | Performed by: INTERNAL MEDICINE

## 2020-09-04 PROCEDURE — G8536 NO DOC ELDER MAL SCRN: HCPCS | Performed by: INTERNAL MEDICINE

## 2020-09-04 PROCEDURE — G9899 SCRN MAM PERF RSLTS DOC: HCPCS | Performed by: INTERNAL MEDICINE

## 2020-09-04 PROCEDURE — 3017F COLORECTAL CA SCREEN DOC REV: CPT | Performed by: INTERNAL MEDICINE

## 2020-09-04 PROCEDURE — 81003 URINALYSIS AUTO W/O SCOPE: CPT | Performed by: INTERNAL MEDICINE

## 2020-09-04 PROCEDURE — 1090F PRES/ABSN URINE INCON ASSESS: CPT | Performed by: INTERNAL MEDICINE

## 2020-09-04 PROCEDURE — G8400 PT W/DXA NO RESULTS DOC: HCPCS | Performed by: INTERNAL MEDICINE

## 2020-09-04 PROCEDURE — G8754 DIAS BP LESS 90: HCPCS | Performed by: INTERNAL MEDICINE

## 2020-09-04 PROCEDURE — G8432 DEP SCR NOT DOC, RNG: HCPCS | Performed by: INTERNAL MEDICINE

## 2020-09-04 PROCEDURE — G8417 CALC BMI ABV UP PARAM F/U: HCPCS | Performed by: INTERNAL MEDICINE

## 2020-09-04 NOTE — PROGRESS NOTES
Albertina Fleischer is a 72 y.o. female who was seen in clinic today (9/4/2020). Assessment & Plan:     Diagnoses and all orders for this visit:    1. Hypothyroidism due to acquired atrophy of thyroid- previously elevated, due for labs  -     TSH 3RD GENERATION    2. Encounter for screening mammogram for malignant neoplasm of breast  -     VALERIO MAMMO BI SCREENING INCL CAD; Future    3. Postmenopausal state  -     DEXA BONE DENSITY STUDY AXIAL; Future    4. Hypertension, essential- at goal, continue current treatment pending review of labs   -     METABOLIC PANEL, COMPREHENSIVE  -     CBC W/O DIFF    5. Atrial tachycardia ([address]- asymptomatic, no changes  -     METABOLIC PANEL, COMPREHENSIVE  -     CBC W/O DIFF    6. Pure hypercholesterolemia- elevated, will repeat labs and now that she has insurance can consider different statin (had issues w/ simvastatin)  -     METABOLIC PANEL, COMPREHENSIVE    7. Acute pain of right knee- this is a new problem, symptoms are: stable, differential dx reviewed with the patient, favor OA exacerbation more then meniscus at this time. Will treat with: ice, NSAIDs, rest, stretching. Red flags were reviewed with the patient to RTC or notify me, expected time course for resolution reviewed. 8. Dysuria    9. Burning with urination  -     AMB POC URINALYSIS DIP STICK AUTO W/O MICRO    10. Needs flu shot  -     FLU (FLUAD QUAD INFLUENZA VACCINE,QUAD,ADJUVANTED)      Follow-up and Dispositions    · Return in about 4 months (around 1/4/2021) for FULL PHYSICAL - 30 minutes. Subjective:   Kristi was seen today for Hypertension (FU); Cholesterol Problem (FU); and Thyroid Problem (FU)        Since last visit: turned 65, is on Medicare. Endocrine Review  Patient is seen for followup of hypothyroidism. She is taking 1 tab daily but 1/2 tab on Sunday. She reports medication compliance: all the time and is taking separate from all other meds.   She reports the following concerns/problems/med side effects: none. Lab review: labs reviewed and discussed with patient. Cardiovascular Review  The patient has hypertension, hyperlipidemia and h/o AT. She reports taking medications as instructed, no medication side effects noted, patient does not perform home BP monitoring. Diet and Lifestyle: generally follows a low fat low cholesterol diet, generally follows a low sodium diet, no formal exercise but active during the day. Labs: reviewed and discussed with patient. Pain Review:  She presents do to pain of her right knee that is secondary to no known injury and started a few weeks ago. Since it started her pain has not changed. She describes the pain as aching. It is intermittent and only lasting for a few seconds. The pain radiates: no where. It is located on the inside of the knee. No giving out but at times does feel like it will lock up on her, but has not yet. Exacerbating factors identifiable by patient are standing, walking. She has tried the following: Tylenol and osteo bi-flex. These have been: not very effective. Previous workup: none. Prior to Admission medications    Medication Sig Start Date End Date Taking? Authorizing Provider   hydroCHLOROthiazide (HYDRODIURIL) 12.5 mg tablet Take 1 Tab by mouth daily. 7/31/20  Yes Noa Tellez MD   levothyroxine (SYNTHROID) 112 mcg tablet TAKE ONE TABLET BY MOUTH DAILY 7/22/20  Yes Noa Tellez MD   metoprolol tartrate (LOPRESSOR) 50 mg tablet TAKE ONE TABLET BY MOUTH TWICE A DAY 4/24/20  Yes Nao Tellez MD   amLODIPine (NORVASC) 5 mg tablet Take 5 mg by mouth daily. Yes Provider, Historical   lovastatin (MEVACOR) 20 mg tablet Take 1 Tab by mouth nightly. 4/24/20  Yes Noa Tellez MD   famotidine (PEPCID) 40 mg tablet Take 1 Tab by mouth daily.  1/17/20  Yes Noa Tellez MD   cyanocobalamin (VITAMIN B-12) 1,000 mcg tablet Take 1,000 mcg by mouth every other day.   Yes Provider, Historical   aspirin 81 mg tablet Take 1 Tab by mouth daily. 11/18/11  Yes Caroline Rico MD   multivitamin (ONE A DAY) tablet Take 1 Tab by mouth daily. 5/31/11  Yes Provider, Historical   traZODone (DESYREL) 100 mg tablet Take 0.5-1 Tabs by mouth nightly as needed for Sleep. 4/24/20 9/4/20  Caroline Rico MD   acetaminophen (TYLENOL) 325 mg tablet Take  by mouth every four (4) hours as needed for Pain. Provider, Historical   CALCIUM CARBONATE (CALCIUM 600 PO) Take 1 Tab by mouth two (2) times a day. 9/4/20  Provider, Historical          Allergies   Allergen Reactions    Lisinopril Cough    Simvastatin Myalgia           Review of Systems   Constitutional: Negative for malaise/fatigue and weight loss. Respiratory: Negative for cough and shortness of breath. Cardiovascular: Negative for chest pain, palpitations and leg swelling. Gastrointestinal: Negative for abdominal pain, constipation, diarrhea, heartburn, nausea and vomiting. Musculoskeletal: Positive for joint pain. Negative for myalgias. Skin: Negative for rash. Neurological: Negative for dizziness and headaches. Psychiatric/Behavioral: Negative for depression. The patient is not nervous/anxious and does not have insomnia. Objective:   Physical Exam  Constitutional:       General: She is not in acute distress. Appearance: Normal appearance. Eyes:      Conjunctiva/sclera: Conjunctivae normal.   Neck:      Thyroid: No thyromegaly. Cardiovascular:      Rate and Rhythm: Regular rhythm. Heart sounds: No murmur. Pulmonary:      Effort: Pulmonary effort is normal.      Breath sounds: Normal breath sounds. No decreased breath sounds or wheezing. Abdominal:      General: Bowel sounds are normal.      Palpations: Abdomen is soft. Tenderness: There is no abdominal tenderness.    Musculoskeletal:      Right knee: She exhibits normal range of motion, no swelling, no ecchymosis and no deformity. Tenderness found. Medial joint line and lateral joint line tenderness noted. No patellar tendon tenderness noted. Right lower leg: No edema. Left lower leg: No edema. Psychiatric:         Mood and Affect: Mood and affect normal.           Visit Vitals  /86   Pulse 82   Temp 97.3 °F (36.3 °C) (Temporal)   Resp 18   Ht 5' 2\" (1.575 m)   Wt 160 lb (72.6 kg)   LMP 01/01/2003   SpO2 98%   BMI 29.26 kg/m²         Disclaimer:  Advised her to call back or return to office if symptoms worsen/change/persist.  Discussed expected course/resolution/complications of diagnosis in detail with patient. Medication risks/benefits/costs/interactions/alternatives discussed with patient. She was given an after visit summary which includes diagnoses, current medications, & vitals. She expressed understanding with the diagnosis and plan. Aspects of this note may have been generated using voice recognition software. Despite editing, there may be some syntax errors.        Ruben Wang MD

## 2020-09-05 LAB
ALBUMIN SERPL-MCNC: 4.3 G/DL (ref 3.8–4.8)
ALBUMIN/GLOB SERPL: 1.5 {RATIO} (ref 1.2–2.2)
ALP SERPL-CCNC: 109 IU/L (ref 39–117)
ALT SERPL-CCNC: 16 IU/L (ref 0–32)
AST SERPL-CCNC: 22 IU/L (ref 0–40)
BILIRUB SERPL-MCNC: 0.4 MG/DL (ref 0–1.2)
BUN SERPL-MCNC: 8 MG/DL (ref 8–27)
BUN/CREAT SERPL: 14 (ref 12–28)
CALCIUM SERPL-MCNC: 9.5 MG/DL (ref 8.7–10.3)
CHLORIDE SERPL-SCNC: 100 MMOL/L (ref 96–106)
CO2 SERPL-SCNC: 24 MMOL/L (ref 20–29)
CREAT SERPL-MCNC: 0.59 MG/DL (ref 0.57–1)
ERYTHROCYTE [DISTWIDTH] IN BLOOD BY AUTOMATED COUNT: 12.7 % (ref 11.7–15.4)
GLOBULIN SER CALC-MCNC: 2.9 G/DL (ref 1.5–4.5)
GLUCOSE SERPL-MCNC: 89 MG/DL (ref 65–99)
HCT VFR BLD AUTO: 41 % (ref 34–46.6)
HGB BLD-MCNC: 13.7 G/DL (ref 11.1–15.9)
MCH RBC QN AUTO: 31.9 PG (ref 26.6–33)
MCHC RBC AUTO-ENTMCNC: 33.4 G/DL (ref 31.5–35.7)
MCV RBC AUTO: 96 FL (ref 79–97)
PLATELET # BLD AUTO: 361 X10E3/UL (ref 150–450)
POTASSIUM SERPL-SCNC: 3.7 MMOL/L (ref 3.5–5.2)
PROT SERPL-MCNC: 7.2 G/DL (ref 6–8.5)
RBC # BLD AUTO: 4.29 X10E6/UL (ref 3.77–5.28)
SODIUM SERPL-SCNC: 139 MMOL/L (ref 134–144)
TSH SERPL DL<=0.005 MIU/L-ACNC: 2.95 UIU/ML (ref 0.45–4.5)
WBC # BLD AUTO: 7.9 X10E3/UL (ref 3.4–10.8)

## 2020-09-17 ENCOUNTER — HOSPITAL ENCOUNTER (OUTPATIENT)
Dept: MAMMOGRAPHY | Age: 65
Discharge: HOME | End: 2020-09-17
Attending: INTERNAL MEDICINE
Payer: MEDICARE

## 2020-09-17 DIAGNOSIS — Z12.31 ENCOUNTER FOR SCREENING MAMMOGRAM FOR MALIGNANT NEOPLASM OF BREAST: ICD-10-CM

## 2020-09-17 DIAGNOSIS — Z78.0 POSTMENOPAUSAL STATE: ICD-10-CM

## 2020-09-17 PROBLEM — M85.852 OSTEOPENIA OF LEFT HIP: Status: ACTIVE | Noted: 2020-09-17

## 2020-09-17 PROCEDURE — 77067 SCR MAMMO BI INCL CAD: CPT

## 2020-09-17 PROCEDURE — 77080 DXA BONE DENSITY AXIAL: CPT

## 2020-09-18 NOTE — PROGRESS NOTES
Results reviewed. Results released via [email or web address]. DEXA (Bone Density) is abnormal - osteopenia (L hip only), no changes.

## 2020-11-03 RX ORDER — LEVOTHYROXINE SODIUM 112 UG/1
TABLET ORAL
Qty: 90 TAB | Refills: 0 | Status: SHIPPED | OUTPATIENT
Start: 2020-11-03 | End: 2021-01-12

## 2020-12-01 DIAGNOSIS — I10 HYPERTENSION, ESSENTIAL: ICD-10-CM

## 2020-12-01 RX ORDER — HYDROCHLOROTHIAZIDE 12.5 MG/1
TABLET ORAL
Qty: 90 TAB | Refills: 1 | Status: SHIPPED | OUTPATIENT
Start: 2020-12-01 | End: 2021-01-13

## 2020-12-18 ENCOUNTER — HOSPITAL ENCOUNTER (OUTPATIENT)
Dept: GENERAL RADIOLOGY | Age: 65
Discharge: HOME | End: 2020-12-18
Attending: INTERNAL MEDICINE
Payer: MEDICARE

## 2020-12-18 ENCOUNTER — OFFICE VISIT (OUTPATIENT)
Dept: INTERNAL MEDICINE CLINIC | Age: 65
End: 2020-12-18
Payer: MEDICARE

## 2020-12-18 ENCOUNTER — TELEPHONE (OUTPATIENT)
Dept: INTERNAL MEDICINE CLINIC | Age: 65
End: 2020-12-18

## 2020-12-18 VITALS
TEMPERATURE: 97.8 F | WEIGHT: 157 LBS | OXYGEN SATURATION: 100 % | DIASTOLIC BLOOD PRESSURE: 93 MMHG | HEIGHT: 62 IN | BODY MASS INDEX: 28.89 KG/M2 | SYSTOLIC BLOOD PRESSURE: 170 MMHG | HEART RATE: 68 BPM | RESPIRATION RATE: 18 BRPM

## 2020-12-18 DIAGNOSIS — M25.561 ACUTE PAIN OF RIGHT KNEE: ICD-10-CM

## 2020-12-18 DIAGNOSIS — M25.561 ACUTE PAIN OF RIGHT KNEE: Primary | ICD-10-CM

## 2020-12-18 DIAGNOSIS — Z23 ENCOUNTER FOR IMMUNIZATION: ICD-10-CM

## 2020-12-18 PROCEDURE — G8536 NO DOC ELDER MAL SCRN: HCPCS | Performed by: INTERNAL MEDICINE

## 2020-12-18 PROCEDURE — G8755 DIAS BP > OR = 90: HCPCS | Performed by: INTERNAL MEDICINE

## 2020-12-18 PROCEDURE — 1090F PRES/ABSN URINE INCON ASSESS: CPT | Performed by: INTERNAL MEDICINE

## 2020-12-18 PROCEDURE — 3017F COLORECTAL CA SCREEN DOC REV: CPT | Performed by: INTERNAL MEDICINE

## 2020-12-18 PROCEDURE — 90732 PPSV23 VACC 2 YRS+ SUBQ/IM: CPT | Performed by: INTERNAL MEDICINE

## 2020-12-18 PROCEDURE — G0463 HOSPITAL OUTPT CLINIC VISIT: HCPCS | Performed by: INTERNAL MEDICINE

## 2020-12-18 PROCEDURE — 96372 THER/PROPH/DIAG INJ SC/IM: CPT | Performed by: INTERNAL MEDICINE

## 2020-12-18 PROCEDURE — G8432 DEP SCR NOT DOC, RNG: HCPCS | Performed by: INTERNAL MEDICINE

## 2020-12-18 PROCEDURE — G8753 SYS BP > OR = 140: HCPCS | Performed by: INTERNAL MEDICINE

## 2020-12-18 PROCEDURE — 99213 OFFICE O/P EST LOW 20 MIN: CPT | Performed by: INTERNAL MEDICINE

## 2020-12-18 PROCEDURE — G9899 SCRN MAM PERF RSLTS DOC: HCPCS | Performed by: INTERNAL MEDICINE

## 2020-12-18 PROCEDURE — G8427 DOCREV CUR MEDS BY ELIG CLIN: HCPCS | Performed by: INTERNAL MEDICINE

## 2020-12-18 PROCEDURE — G8399 PT W/DXA RESULTS DOCUMENT: HCPCS | Performed by: INTERNAL MEDICINE

## 2020-12-18 PROCEDURE — 1101F PT FALLS ASSESS-DOCD LE1/YR: CPT | Performed by: INTERNAL MEDICINE

## 2020-12-18 PROCEDURE — G8417 CALC BMI ABV UP PARAM F/U: HCPCS | Performed by: INTERNAL MEDICINE

## 2020-12-18 PROCEDURE — 73562 X-RAY EXAM OF KNEE 3: CPT

## 2020-12-18 RX ORDER — METHYLPREDNISOLONE ACETATE 40 MG/ML
40 INJECTION, SUSPENSION INTRA-ARTICULAR; INTRALESIONAL; INTRAMUSCULAR; SOFT TISSUE ONCE
Status: COMPLETED | OUTPATIENT
Start: 2020-12-18 | End: 2020-12-18

## 2020-12-18 RX ORDER — METHYLPREDNISOLONE SODIUM SUCCINATE 40 MG/ML
40 INJECTION, POWDER, LYOPHILIZED, FOR SOLUTION INTRAMUSCULAR; INTRAVENOUS ONCE
Status: DISCONTINUED | OUTPATIENT
Start: 2020-12-18 | End: 2020-12-18 | Stop reason: CLARIF

## 2020-12-18 RX ORDER — DICLOFENAC SODIUM 10 MG/G
2 GEL TOPICAL DAILY
COMMUNITY

## 2020-12-18 RX ORDER — LIDOCAINE HYDROCHLORIDE 10 MG/ML
1 INJECTION INFILTRATION; PERINEURAL ONCE
Status: COMPLETED | OUTPATIENT
Start: 2020-12-18 | End: 2020-12-18

## 2020-12-18 RX ORDER — METHYLPREDNISOLONE ACETATE 40 MG/ML
40 INJECTION, SUSPENSION INTRA-ARTICULAR; INTRALESIONAL; INTRAMUSCULAR; SOFT TISSUE ONCE
Status: DISCONTINUED | OUTPATIENT
Start: 2020-12-18 | End: 2020-12-18 | Stop reason: CLARIF

## 2020-12-18 RX ADMIN — METHYLPREDNISOLONE ACETATE 40 MG: 40 INJECTION, SUSPENSION INTRA-ARTICULAR; INTRALESIONAL; INTRAMUSCULAR; SOFT TISSUE at 12:36

## 2020-12-18 RX ADMIN — LIDOCAINE HYDROCHLORIDE 1 ML: 10 INJECTION, SOLUTION INFILTRATION; PERINEURAL at 10:30

## 2020-12-18 NOTE — PATIENT INSTRUCTIONS
Pneumococcal Polysaccharide Vaccine: What You Need to Know  Why get vaccinated? Pneumococcal polysaccharide vaccine (PPSV23) can prevent pneumococcal disease. Pneumococcal disease refers to any illness caused by pneumococcal bacteria. These bacteria can cause many types of illnesses, including pneumonia, which is an infection of the lungs. Pneumococcal bacteria are one of the most common causes of pneumonia. Besides pneumonia, pneumococcal bacteria can also cause:  · Ear infections,  · Sinus infections  · Meningitis (infection of the tissue covering the brain and spinal cord)  · Bacteremia (bloodstream infection)  Anyone can get pneumococcal disease, but children under 3years of age, people with certain medical conditions, adults 72 years or older, and cigarette smokers are at the highest risk. Most pneumococcal infections are mild. However, some can result in long-term problems, such as brain damage or hearing loss. Meningitis, bacteremia, and pneumonia caused by pneumococcal disease can be fatal.  PPSV23  PPSV23 protects against 23 types of bacteria that cause pneumococcal disease. PPSV23 is recommended for:  · All adults 72 years or older,  · Anyone 2 years or older with certain medical conditions that can lead to an increased risk for pneumococcal disease. Most people need only one dose of PPSV23. A second dose of PPSV23, and another type of pneumococcal vaccine called PCV13, are recommended for certain high-risk groups. Your health care provider can give you more information. People 65 years or older should get a dose of PPSV23 even if they have already gotten one or more doses of the vaccine before they turned 65. Talk with your health care provider  Tell your vaccine provider if the person getting the vaccine:  · Has had an allergic reaction after a previous dose of PPSV23, or has any severe, life-threatening allergies.   In some cases, your health care provider may decide to postpone PPSV23 vaccination to a future visit. People with minor illnesses, such as a cold, may be vaccinated. People who are moderately or severely ill should usually wait until they recover before getting PPSV23. Your health care provider can give you more information. Risks of a vaccine reaction  · Redness or pain where the shot is given, feeling tired, fever, or muscle aches can happen after PPSV23. People sometimes faint after medical procedures, including vaccination. Tell your provider if you feel dizzy or have vision changes or ringing in the ears. As with any medicine, there is a very remote chance of a vaccine causing a severe allergic reaction, other serious injury, or death. What if there is a serious problem? An allergic reaction could occur after the vaccinated person leaves the clinic. If you see signs of a severe allergic reaction (hives, swelling of the face and throat, difficulty breathing, a fast heartbeat, dizziness, or weakness), call 9-1-1 and get the person to the nearest hospital.  For other signs that concern you, call your health care provider. Adverse reactions should be reported to the Vaccine Adverse Event Reporting System (VAERS). Your health care provider will usually file this report, or you can do it yourself. Visit the VAERS website at www.vaers. hhs.gov at www.vaers. hhs.gov or call 1-190.227.8684. VAERS is only for reporting reactions, and VAERS staff do not give medical advice. How can I learn more? · Ask your health care provider. · Call your local or state health department. · Contact the Centers for Disease Control and Prevention (CDC):  ? Call 7-476.405.6876 (1-800-CDC-INFO) or  ? Visit CDC's website at www.cdc.gov/vaccines  Vaccine Information Statement  PPSV23 Vaccine  10/30/2019  [facility] for Disease Control and Prevention  Many Vaccine Information Statements are available in Mongolian and other languages. See www.immunize.org/vis.   Hojas de información Sobre Vacunas están disponibles en español y en muchos otros idiomas. [employer]ite [employer]. Care instructions adapted under license by [employer] (which disclaims liability or warranty for this information). If you have questions about a medical condition or this instruction, always ask your healthcare professional. [address] any warranty or liability for your use of this information.

## 2020-12-18 NOTE — PROGRESS NOTES
Right knee pain since Wednesday. NKI. Cata Cisse  is a 72 y.o. who presents for routine immunizations. Prior to vaccine administration: Consent was obtained. Risks and adverse reactions were discussed. The patient was provided the VIS and they were given an opportunity to ask questions; all questions were addressed. She  denies any symptoms, reactions or allergies that would exclude them from being immunized today. There were no adverse reactions observed post vaccination. Patient was advised to seek medical or call the office with any questions or concerns post vaccination. Patient verbalized understanding.    Agustina Benz RN

## 2020-12-18 NOTE — PROGRESS NOTES
Results reviewed. Please call patient and notify her that her xray is abnormal, and shows arthritis. Hopefully steroid injection helped. Needs to limit triggers (prolonged standing, walking). If this continues will need to see ortho to d/w her about TKR.

## 2020-12-18 NOTE — PROGRESS NOTES
Verified patient identity with two identifiers. Spoke with patient by phone. notified her that her xray is abnormal, and shows arthritis. She states steroid injection has helped. Informed to limit triggers (prolonged standing, walking) and if this continues will need to refer to ortho. Patient verbalized understanding.

## 2020-12-18 NOTE — PROGRESS NOTES
Edie Baumann is a 72 y.o. female who was seen in clinic today (12/18/2020) for an acute visit. Assessment & Plan:   1. Acute pain of right knee- this is an acute on chronic problem, symptoms are: not improving, differential dx reviewed with the patient, favor inflammation (likely overuse on top of OA). Will treat w/ steroid injection today and continue w/ topical treatments. Can continue ice and knee brace. Will get xray - shows OA, no fracture. Red flags and expectations were reviewed & discussed with the her. She verbalized understanding. .   -     lidocaine (XYLOCAINE) 10 mg/mL (1 %) injection 1 mL; 1 mL, Intra artICUlar, ONCE, 1 dose, Fri 12/18/20 at 1200  -     methylPREDNISolone acetate (DEPO-MEDROL) 40 mg/mL injection 40 mg; 40 mg, Intra artICUlar, ONCE, 1 dose, Fri 12/18/20 at 1200    2. Encounter for immunization  -     PNEUMOCOCCAL POLYSACCHARIDE VACCINE, 23-VALENT, ADULT OR IMMUNOSUPPRESSED PT DOSE,  -     ADMIN PNEUMOCOCCAL VACCINE      Follow-up and Dispositions    · Return if symptoms worsen or fail to improve. Subjective:   Kristi was seen today for Knee Pain    Pain Review:  She presents do to pain of her right knee that is secondary to no known injury and started 2 days ago. Since it started her pain has not changed. She has had issues of R knee pain on/off for the last year. Normally when it does flare will only last for 1-2 days, this is slightly different. She describes the pain as sharp. It is constant but fluctuating in intensity. The pain radiates: no where. Exacerbating factors identifiable by patient are prolonged standing. She has tried the following: Voltaren gel and ice and tylenol. These have been: somewhat effective. Previous workup: seen in Jan '20 for similar issues. Procedure Note  Procedure: Joint injection  Location: knee - right  Time: 10:45 AM    Procedure explained to patient.       Time out performed:  * Patient was identified by name and date of birth   * Agreement on procedure being performed was verified  * Risks and Benefits explained to the patient  * Procedure site verified and marked as necessary  * Patient was positioned for comfort  * Consent was signed and verified    Pain level prior to procedure: 4/10  Pain level after procedure: 4/10    The area was sterilized with Povidone-Iodine. The site was anesthetized with ethyl chloride. An 22 gauge needle was used to inject 40mg of depo-medrol and 1 mL of 1% lidocaine into the right knee using a lateral approach. Patient tolerated the procedure well. Hemostasis was obtained and a bandage placed. Red flags were reviewed with the patient to RTC or notify me. Prior to Admission medications    Medication Sig Start Date End Date Taking? Authorizing Provider   calcium carbonate/vitamin D3 (CALCIUM 600 + D,3, PO) Take  by mouth every other day. Yes Provider, Historical   diclofenac (Voltaren) 1 % gel Apply  to affected area daily. Yes Provider, Historical   trolamine salicylate (ASPERCREME EX) by Apply Externally route every other day. Yes Provider, Historical   hydroCHLOROthiazide (HYDRODIURIL) 12.5 mg tablet TAKE ONE TABLET BY MOUTH DAILY 12/1/20  Yes Kelley Miller MD   levothyroxine (SYNTHROID) 112 mcg tablet TAKE ONE TABLET BY MOUTH DAILY 11/3/20  Yes Kelley Miller MD   metoprolol tartrate (LOPRESSOR) 50 mg tablet TAKE ONE TABLET BY MOUTH TWICE A DAY 4/24/20  Yes Kelley Miller MD   amLODIPine (NORVASC) 5 mg tablet Take 5 mg by mouth daily. Yes Provider, Historical   famotidine (PEPCID) 40 mg tablet Take 1 Tab by mouth daily. 1/17/20  Yes Kelley Miller MD   cyanocobalamin (VITAMIN B-12) 1,000 mcg tablet Take 1,000 mcg by mouth every other day. Yes Provider, Historical   acetaminophen (TYLENOL) 325 mg tablet Take  by mouth every four (4) hours as needed for Pain. Yes Provider, Historical   aspirin 81 mg tablet Take 1 Tab by mouth daily. 11/18/11  Yes Cachorro Mehta MD   multivitamin (ONE A DAY) tablet Take 1 Tab by mouth daily. 5/31/11  Yes Provider, Historical   lovastatin (MEVACOR) 20 mg tablet Take 1 Tab by mouth nightly. 4/24/20   Cachorro Mehta MD          Allergies   Allergen Reactions    Lisinopril Cough    Simvastatin Myalgia           Review of Systems   Constitutional: Negative for chills, fever, malaise/fatigue and weight loss. Respiratory: Negative for cough and shortness of breath. Cardiovascular: Negative for chest pain and palpitations. Gastrointestinal: Negative for abdominal pain, constipation, diarrhea, nausea and vomiting. Musculoskeletal: Positive for joint pain. Negative for back pain. Objective:   Physical Exam  Musculoskeletal:      Right shoulder: Swelling: medial aspect. Right knee: She exhibits swelling (medial aspect) and deformity (knee appears to be laterally shifted). She exhibits normal range of motion. Tenderness found. Medial joint line tenderness noted. No lateral joint line and no patellar tendon tenderness noted. Visit Vitals  BP (!) 170/93   Pulse 68   Temp 97.8 °F (36.6 °C) (Temporal)   Resp 18   Ht 5' 2\" (1.575 m)   Wt 157 lb (71.2 kg)   LMP 01/01/2003   SpO2 100%   BMI 28.72 kg/m²         Disclaimer:  Advised her to call back or return to office if symptoms worsen/change/persist.  Discussed expected course/resolution/complications of diagnosis in detail with patient. Medication risks/benefits/costs/interactions/alternatives discussed with patient. She was given an after visit summary which includes diagnoses, current medications, & vitals. She expressed understanding with the diagnosis and plan. Aspects of this note may have been generated using voice recognition software. Despite editing, there may be some syntax errors.        Caitlin Kinsey MD

## 2021-01-04 ENCOUNTER — TELEPHONE (OUTPATIENT)
Dept: INTERNAL MEDICINE CLINIC | Age: 66
End: 2021-01-04

## 2021-01-04 NOTE — TELEPHONE ENCOUNTER
Call to patient, identified with 2 identifiers. Advised of Dr. Dwain Lock' note and recommendations. She is mostly feeling tired. Reviewed quarantine, symptom management, red flags to go to ED. Patient verbalized understanding.

## 2021-01-04 NOTE — TELEPHONE ENCOUNTER
Anum Castillo (Self) [phone] (H)     Pt wants to let dr Sujey Arias know that she tested [facility] for [facility]

## 2021-01-12 ENCOUNTER — HOSPITAL ENCOUNTER (OUTPATIENT)
Age: 66
Setting detail: OBSERVATION
Discharge: HOME | End: 2021-01-13
Attending: EMERGENCY MEDICINE | Admitting: FAMILY MEDICINE
Payer: MEDICARE

## 2021-01-12 ENCOUNTER — APPOINTMENT (OUTPATIENT)
Dept: GENERAL RADIOLOGY | Age: 66
End: 2021-01-12
Attending: EMERGENCY MEDICINE
Payer: MEDICARE

## 2021-01-12 ENCOUNTER — APPOINTMENT (OUTPATIENT)
Dept: CT IMAGING | Age: 66
End: 2021-01-12
Attending: FAMILY MEDICINE
Payer: MEDICARE

## 2021-01-12 DIAGNOSIS — R77.8 ELEVATED TROPONIN: ICD-10-CM

## 2021-01-12 DIAGNOSIS — U07.1 COVID-19: Primary | ICD-10-CM

## 2021-01-12 LAB
ALBUMIN SERPL-MCNC: 3.3 G/DL (ref 3.5–5)
ALBUMIN/GLOB SERPL: 0.8 {RATIO} (ref 1.1–2.2)
ALP SERPL-CCNC: 138 U/L (ref 45–117)
ALT SERPL-CCNC: 31 U/L (ref 12–78)
ANION GAP SERPL CALC-SCNC: 5 MMOL/L (ref 5–15)
AST SERPL-CCNC: 23 U/L (ref 15–37)
ATRIAL RATE: 69 BPM
BASOPHILS # BLD: 0 K/UL (ref 0–0.1)
BASOPHILS # BLD: 0 K/UL (ref 0–0.1)
BASOPHILS NFR BLD: 0 % (ref 0–1)
BASOPHILS NFR BLD: 1 % (ref 0–1)
BILIRUB SERPL-MCNC: 0.2 MG/DL (ref 0.2–1)
BUN SERPL-MCNC: 15 MG/DL (ref 6–20)
BUN/CREAT SERPL: 19 (ref 12–20)
CALCIUM SERPL-MCNC: 9.1 MG/DL (ref 8.5–10.1)
CALCULATED P AXIS, ECG09: 50 DEGREES
CALCULATED R AXIS, ECG10: 14 DEGREES
CALCULATED T AXIS, ECG11: 52 DEGREES
CHLORIDE SERPL-SCNC: 99 MMOL/L (ref 97–108)
CO2 SERPL-SCNC: 26 MMOL/L (ref 21–32)
COMMENT, HOLDF: NORMAL
COMMENT, HOLDF: NORMAL
COVID-19 RAPID TEST, COVR: DETECTED
CREAT SERPL-MCNC: 0.81 MG/DL (ref 0.55–1.02)
DIAGNOSIS, 93000: NORMAL
DIFFERENTIAL METHOD BLD: ABNORMAL
DIFFERENTIAL METHOD BLD: ABNORMAL
EOSINOPHIL # BLD: 0.1 K/UL (ref 0–0.4)
EOSINOPHIL # BLD: 0.1 K/UL (ref 0–0.4)
EOSINOPHIL NFR BLD: 1 % (ref 0–7)
EOSINOPHIL NFR BLD: 1 % (ref 0–7)
ERYTHROCYTE [DISTWIDTH] IN BLOOD BY AUTOMATED COUNT: 12.3 % (ref 11.5–14.5)
ERYTHROCYTE [DISTWIDTH] IN BLOOD BY AUTOMATED COUNT: 12.4 % (ref 11.5–14.5)
FERRITIN SERPL-MCNC: 63 NG/ML (ref 8–252)
GLOBULIN SER CALC-MCNC: 4.3 G/DL (ref 2–4)
GLUCOSE SERPL-MCNC: 118 MG/DL (ref 65–100)
HCT VFR BLD AUTO: 39.5 % (ref 35–47)
HCT VFR BLD AUTO: 39.5 % (ref 35–47)
HEALTH STATUS, XMCV2T: ABNORMAL
HGB BLD-MCNC: 13.8 G/DL (ref 11.5–16)
HGB BLD-MCNC: 14 G/DL (ref 11.5–16)
IMM GRANULOCYTES # BLD AUTO: 0.1 K/UL (ref 0–0.04)
IMM GRANULOCYTES # BLD AUTO: 0.1 K/UL (ref 0–0.04)
IMM GRANULOCYTES NFR BLD AUTO: 1 % (ref 0–0.5)
IMM GRANULOCYTES NFR BLD AUTO: 1 % (ref 0–0.5)
LACTATE SERPL-SCNC: 1.4 MMOL/L (ref 0.4–2)
LYMPHOCYTES # BLD: 2.2 K/UL (ref 0.8–3.5)
LYMPHOCYTES # BLD: 2.2 K/UL (ref 0.8–3.5)
LYMPHOCYTES NFR BLD: 23 % (ref 12–49)
LYMPHOCYTES NFR BLD: 28 % (ref 12–49)
MAGNESIUM SERPL-MCNC: 2.3 MG/DL (ref 1.6–2.4)
MCH RBC QN AUTO: 32 PG (ref 26–34)
MCH RBC QN AUTO: 32.3 PG (ref 26–34)
MCHC RBC AUTO-ENTMCNC: 34.9 G/DL (ref 30–36.5)
MCHC RBC AUTO-ENTMCNC: 35.4 G/DL (ref 30–36.5)
MCV RBC AUTO: 91.2 FL (ref 80–99)
MCV RBC AUTO: 91.6 FL (ref 80–99)
MONOCYTES # BLD: 0.7 K/UL (ref 0–1)
MONOCYTES # BLD: 0.9 K/UL (ref 0–1)
MONOCYTES NFR BLD: 9 % (ref 5–13)
MONOCYTES NFR BLD: 9 % (ref 5–13)
NEUTS SEG # BLD: 4.9 K/UL (ref 1.8–8)
NEUTS SEG # BLD: 6.5 K/UL (ref 1.8–8)
NEUTS SEG NFR BLD: 60 % (ref 32–75)
NEUTS SEG NFR BLD: 66 % (ref 32–75)
NRBC # BLD: 0 K/UL (ref 0–0.01)
NRBC # BLD: 0 K/UL (ref 0–0.01)
NRBC BLD-RTO: 0 PER 100 WBC
NRBC BLD-RTO: 0 PER 100 WBC
P-R INTERVAL, ECG05: 164 MS
PLATELET # BLD AUTO: 345 K/UL (ref 150–400)
PLATELET # BLD AUTO: 374 K/UL (ref 150–400)
PMV BLD AUTO: 8.6 FL (ref 8.9–12.9)
PMV BLD AUTO: 8.7 FL (ref 8.9–12.9)
POTASSIUM SERPL-SCNC: 3.7 MMOL/L (ref 3.5–5.1)
PROCALCITONIN SERPL-MCNC: <0.05 NG/ML
PROT SERPL-MCNC: 7.6 G/DL (ref 6.4–8.2)
Q-T INTERVAL, ECG07: 406 MS
QRS DURATION, ECG06: 88 MS
QTC CALCULATION (BEZET), ECG08: 435 MS
RBC # BLD AUTO: 4.31 M/UL (ref 3.8–5.2)
RBC # BLD AUTO: 4.33 M/UL (ref 3.8–5.2)
SAMPLES BEING HELD,HOLD: NORMAL
SAMPLES BEING HELD,HOLD: NORMAL
SODIUM SERPL-SCNC: 130 MMOL/L (ref 136–145)
SOURCE, COVRS: ABNORMAL
SPECIMEN SOURCE, FCOV2M: ABNORMAL
SPECIMEN TYPE, XMCV1T: ABNORMAL
TROPONIN I SERPL-MCNC: 0.28 NG/ML
TROPONIN I SERPL-MCNC: 0.36 NG/ML
VENTRICULAR RATE, ECG03: 69 BPM
WBC # BLD AUTO: 8 K/UL (ref 3.6–11)
WBC # BLD AUTO: 9.8 K/UL (ref 3.6–11)

## 2021-01-12 PROCEDURE — 87635 SARS-COV-2 COVID-19 AMP PRB: CPT

## 2021-01-12 PROCEDURE — 71275 CT ANGIOGRAPHY CHEST: CPT

## 2021-01-12 PROCEDURE — 82728 ASSAY OF FERRITIN: CPT

## 2021-01-12 PROCEDURE — U0005 INFEC AGEN DETEC AMPLI PROBE: HCPCS

## 2021-01-12 PROCEDURE — 36415 COLL VENOUS BLD VENIPUNCTURE: CPT

## 2021-01-12 PROCEDURE — 80053 COMPREHEN METABOLIC PANEL: CPT

## 2021-01-12 PROCEDURE — 84145 PROCALCITONIN (PCT): CPT

## 2021-01-12 PROCEDURE — 85025 COMPLETE CBC W/AUTO DIFF WBC: CPT

## 2021-01-12 PROCEDURE — 84484 ASSAY OF TROPONIN QUANT: CPT

## 2021-01-12 PROCEDURE — 83605 ASSAY OF LACTIC ACID: CPT

## 2021-01-12 PROCEDURE — 99218 HC RM OBSERVATION: CPT

## 2021-01-12 PROCEDURE — 93005 ELECTROCARDIOGRAM TRACING: CPT

## 2021-01-12 PROCEDURE — 71045 X-RAY EXAM CHEST 1 VIEW: CPT

## 2021-01-12 PROCEDURE — 83735 ASSAY OF MAGNESIUM: CPT

## 2021-01-12 PROCEDURE — 74011000636 HC RX REV CODE- 636: Performed by: FAMILY MEDICINE

## 2021-01-12 PROCEDURE — 99284 EMERGENCY DEPT VISIT MOD MDM: CPT

## 2021-01-12 RX ORDER — LEVOTHYROXINE SODIUM 125 UG/1
125 TABLET ORAL
Status: DISCONTINUED | OUTPATIENT
Start: 2021-01-13 | End: 2021-01-13

## 2021-01-12 RX ORDER — ACETAMINOPHEN 650 MG/1
650 SUPPOSITORY RECTAL
Status: DISCONTINUED | OUTPATIENT
Start: 2021-01-12 | End: 2021-01-13 | Stop reason: HOSPADM

## 2021-01-12 RX ORDER — POTASSIUM CHLORIDE 750 MG/1
10 TABLET, FILM COATED, EXTENDED RELEASE ORAL DAILY
COMMUNITY
End: 2021-01-13

## 2021-01-12 RX ORDER — ZINC SULFATE 50(220)MG
1 CAPSULE ORAL DAILY
Status: DISCONTINUED | OUTPATIENT
Start: 2021-01-13 | End: 2021-01-13 | Stop reason: HOSPADM

## 2021-01-12 RX ORDER — ASCORBIC ACID 500 MG
1000 TABLET ORAL DAILY
Status: DISCONTINUED | OUTPATIENT
Start: 2021-01-13 | End: 2021-01-13 | Stop reason: HOSPADM

## 2021-01-12 RX ORDER — ACETAMINOPHEN 325 MG/1
650 TABLET ORAL
Status: DISCONTINUED | OUTPATIENT
Start: 2021-01-12 | End: 2021-01-13 | Stop reason: HOSPADM

## 2021-01-12 RX ORDER — HYDRALAZINE HYDROCHLORIDE 20 MG/ML
10 INJECTION INTRAMUSCULAR; INTRAVENOUS
Status: DISCONTINUED | OUTPATIENT
Start: 2021-01-12 | End: 2021-01-13 | Stop reason: HOSPADM

## 2021-01-12 RX ORDER — AMLODIPINE BESYLATE 5 MG/1
5 TABLET ORAL DAILY
Status: DISCONTINUED | OUTPATIENT
Start: 2021-01-13 | End: 2021-01-13 | Stop reason: HOSPADM

## 2021-01-12 RX ORDER — GUAIFENESIN 100 MG/5ML
81 LIQUID (ML) ORAL DAILY
Status: DISCONTINUED | OUTPATIENT
Start: 2021-01-13 | End: 2021-01-13 | Stop reason: HOSPADM

## 2021-01-12 RX ORDER — THERA TABS 400 MCG
1 TAB ORAL DAILY
Status: DISCONTINUED | OUTPATIENT
Start: 2021-01-13 | End: 2021-01-13 | Stop reason: HOSPADM

## 2021-01-12 RX ORDER — LEVOTHYROXINE SODIUM 125 UG/1
125 TABLET ORAL
Status: ON HOLD | COMMUNITY
End: 2021-01-13 | Stop reason: SDUPTHER

## 2021-01-12 RX ORDER — METOPROLOL TARTRATE 50 MG/1
50 TABLET ORAL DAILY
Status: DISCONTINUED | OUTPATIENT
Start: 2021-01-13 | End: 2021-01-13

## 2021-01-12 RX ADMIN — IOPAMIDOL 70 ML: 755 INJECTION, SOLUTION INTRAVENOUS at 18:48

## 2021-01-12 NOTE — ED PROVIDER NOTES
This is a 51-year-old female with a history of elevated lipids, hypertension, hypothyroid and reflux disease. She complains for the last several days of some intermittent palpitations in her chest.  In April of this year she was seen here for atrial tachycardia and was seen by cardiology and had a catheterization done at the time. On 27 December of last year, the patient was diagnosed with Covid and states that she has been having some trouble with myalgias, shortness of breath and fatigue. She also has had some sweats at night but no definitive fever. She did state on Friday and intermittently since then she has had some substernal discomfort over the sternal inferior margin. There is been no nausea or vomiting, abdominal pain or pain in the extremities. She was seen at [facility] on Friday and told it was nothing they could do and sent her home. She is here today with the above-mentioned symptoms. On her catheterization, it was noted that she had minor coronary disease which was nonobstructive and no angiographic evidence of plaque rupture. It was felt that the elevated troponin at that time was related to the arrhythmia. Troponin at that time was 0.32.            Past Medical History:   Diagnosis Date    GERD (gastroesophageal reflux disease)     Hypercholesterolemia     Hyperlipidemia 5/26/2011    Hypertension     Hypothyroid     Microscopic hematuria 1/2011    - Dr Escudero Cons, w/u negative    Other ill-defined conditions(799.89)     states may have sleep apnea but never tested       Past Surgical History:   Procedure Laterality Date    HX CHOLECYSTECTOMY  6/2/11    HX COLONOSCOPY  6/21/12    HX HEART CATHETERIZATION  04/03/2020    Minor coronary disease--non obstructive with no angiographic evidence of acute plaque rupture    HX HERNIA REPAIR      HX HYSTERECTOMY  2003    for fibroids, cervix is present         Family History:   Problem Relation Age of Onset    Other Mother uterine fibroids    Post-op Nausea/Vomiting Mother     High Cholesterol Father     Coronary Artery Disease Father        Social History     Socioeconomic History    Marital status:      Spouse name: Not on file    Number of children: Not on file    Years of education: Not on file    Highest education level: Not on file   Occupational History    Not on file   Social Needs    Financial resource strain: Not on file    Food insecurity     Worry: Not on file     Inability: Not on file    Transportation needs     Medical: Not on file     Non-medical: Not on file   Tobacco Use    Smoking status: Never Smoker    Smokeless tobacco: Never Used   Substance and Sexual Activity    Alcohol use: No     Frequency: Never     Binge frequency: Never    Drug use: No    Sexual activity: Not Currently     Partners: Male     Birth control/protection: Surgical   Lifestyle    Physical activity     Days per week: Not on file     Minutes per session: Not on file    Stress: Not on file   Relationships    Social connections     Talks on phone: Not on file     Gets together: Not on file     Attends [employer] service: Not on file     Active member of club or organization: Not on file     Attends meetings of clubs or organizations: Not on file     Relationship status: Not on file    Intimate partner violence     Fear of current or ex partner: Not on file     Emotionally abused: Not on file     Physically abused: Not on file     Forced sexual activity: Not on file   Other Topics Concern    Not on file   Social History Narrative    Not on file         ALLERGIES: Lisinopril and Simvastatin    Review of Systems   Constitutional: Positive for chills and fatigue. Negative for activity change, appetite change and fever. HENT: Negative for ear pain, facial swelling, sore throat and trouble swallowing. Eyes: Negative for pain, discharge and visual disturbance. Respiratory: Positive for shortness of breath.  Negative for chest tightness and wheezing. Cardiovascular: Positive for chest pain. Negative for palpitations. Gastrointestinal: Negative for abdominal pain, blood in stool, nausea and vomiting. Genitourinary: Negative for difficulty urinating, flank pain and hematuria. Musculoskeletal: Positive for myalgias. Negative for arthralgias, joint swelling and neck pain. Skin: Negative for color change and rash. Neurological: Negative for dizziness, weakness, numbness and headaches. Hematological: Negative for adenopathy. Does not bruise/bleed easily. Psychiatric/Behavioral: Negative for behavioral problems, confusion and sleep disturbance. All other systems reviewed and are negative. Vitals:    01/12/21 1400   BP: (!) 173/101   Pulse: 69   Resp: 20   Temp: 97.9 °F (36.6 °C)   SpO2: 98%   Weight: 71 kg (156 lb 8.4 oz)   Height: 5' 2\" (1.575 m)            Physical Exam  Vitals signs and nursing note reviewed. Constitutional:       General: She is not in acute distress. Appearance: She is well-developed. Comments: Signs are as noted. Patient does not appear to be in any acute distress. HENT:      Head: Normocephalic and atraumatic. Nose: Nose normal.   Eyes:      General: No scleral icterus. Conjunctiva/sclera: Conjunctivae normal.      Pupils: Pupils are equal, round, and reactive to light. Neck:      Musculoskeletal: Normal range of motion and neck supple. Thyroid: No thyromegaly. Vascular: No JVD. Trachea: No tracheal deviation. Comments: No carotid bruits noted. Cardiovascular:      Rate and Rhythm: Normal rate and regular rhythm. Heart sounds: Normal heart sounds. No murmur. No friction rub. No gallop. Pulmonary:      Effort: Pulmonary effort is normal. No respiratory distress. Breath sounds: Normal breath sounds. No wheezing or rales. Chest:      Chest wall: No tenderness. Abdominal:      General: Bowel sounds are normal. There is no distension. Palpations: Abdomen is soft. There is no mass. Tenderness: There is no abdominal tenderness. There is no guarding or rebound. Musculoskeletal: Normal range of motion. General: No tenderness. Lymphadenopathy:      Cervical: No cervical adenopathy. Skin:     General: Skin is warm and dry. Findings: No erythema or rash. Neurological:      Mental Status: She is alert and oriented to person, place, and time. Cranial Nerves: No cranial nerve deficit. Coordination: Coordination normal.      Deep Tendon Reflexes: Reflexes are normal and symmetric. Psychiatric:         Behavior: Behavior normal.         Thought Content: Thought content normal.         Judgment: Judgment normal.          MDM  Number of Diagnoses or Management Options  COVID-19: new and requires workup  Elevated troponin: new and requires workup     Amount and/or Complexity of Data Reviewed  Clinical lab tests: ordered and reviewed  Tests in the radiology section of CPT®: ordered and reviewed  Decide to obtain previous medical records or to obtain history from someone other than the patient: yes  Review and summarize past medical records: yes  Discuss the patient with other providers: yes  Independent visualization of images, tracings, or specimens: yes    Risk of Complications, Morbidity, and/or Mortality  Presenting problems: high  Diagnostic procedures: high  Management options: high    Patient Progress  Patient progress: stable         Procedures    ED MD EKG interpretation: Normal sinus rhythm with a rate at 69 beats a minute. Axis is leftward. Intervals are normal.  No ectopy is noted. There is no ischemic change appreciated. Josiah Mccauley MD    Patient's troponin is 0.28. Patient has seen Dr. Consuelo Bradley so we will consult him at this time. Question with this elevated troponin is whether or not this is new or whether it still elevated on a chronic basis. Will have cardiology see.      5:34 PM  Still awaiting cardiology    5:58 PM  I have spoken with Dr. Burna Cockayne  , and he agrees with admission and serial enzymes. He would like to rule out any acute event. Unfortunately with the patient's presentation it is unclear exactly what this elevated troponin represents. Consult hospitalist for admission. Perfect Serve Consult for Admission  6:00 PM    ED Room Number: ER06/06  Patient Name and age:  Allyson Jeter 72 y.o.  female  Working Diagnosis:   1. COVID-19    2.  Elevated troponin        COVID-19 Suspicion:  yes  Sepsis present:  no  Reassessment needed: no  Code Status:  Full Code  Readmission: no  Isolation Requirements:  yes  Recommended Level of Care:  telemetry  Department:[facility] Adult ED - 21   Other:

## 2021-01-12 NOTE — ED NOTES
Triage Note: Patient is coming in for irregular HR and chest pain. Patient was diagnosed with COVID on 12/27.

## 2021-01-12 NOTE — H&P
History & Physical    Primary Care Provider: Chasity Samuels MD  Source of Information: Patient     History of Presenting Illness:   Javy Tadeo is a 72 y.o. female who presents with palpitations    History was primarily obtained from the patient    Patient reports that she was diagnosed with COVID-19 infection on December 27. Patient reports that he continues to have some myalgias and shortness of breath off and on. Patient denies any fever but reports that she has night sweats. Patient reports that she has had some pleuritic chest pain off and on but currently does not have any pain. Patient reports she was seen at [facility] on Friday and was told to take analgesics and was discharged home. Patient came back to the ER today because her symptoms persisted. Patient was seen in April and was found to have atrial tachycardia and had a catheterization done. The coronaries did not show any obstructive lesion at that point of time, she was found to have some elevated troponin which was attributed to her arrhythmia. Patient came to the ER today was found to have a troponin of 0.20 and was requested to be admitted to the hospitalist service. Patient denies any other complaints or problems. The patient denies any Headache, blurry vision, sore throat, trouble swallowing, trouble with speech,, cough, fever, chills, N/V/D, abd pain, urinary symptoms, constipation, recent travels, sick contacts, focal or generalized neurological symptoms,  falls, injuries, rashes, contact with COVID-19 diagnosed patients, hematemesis, melena, hemoptysis, hematuria, rashes, denies starting any new medications and denies any other concerns or problems besides as mentioned above. Review of Systems:  A comprehensive review of systems was negative except for that written in the History of Present Illness.      Past Medical History:   Diagnosis Date    GERD (gastroesophageal reflux disease)     Hypercholesterolemia     Hyperlipidemia 5/26/2011    Hypertension     Hypothyroid     Microscopic hematuria 1/2011    - Dr Lidia Levels, w/u negative    Other ill-defined conditions(799.89)     states may have sleep apnea but never tested      Past Surgical History:   Procedure Laterality Date    HX CHOLECYSTECTOMY  6/2/11    HX COLONOSCOPY  6/21/12    HX HEART CATHETERIZATION  04/03/2020    Minor coronary disease--non obstructive with no angiographic evidence of acute plaque rupture    HX HERNIA REPAIR      HX HYSTERECTOMY  2003    for fibroids, cervix is present     Prior to Admission medications    Medication Sig Start Date End Date Taking? Authorizing Provider   levothyroxine (SYNTHROID) 125 mcg tablet Take 125 mcg by mouth Daily (before breakfast). Yes Provider, Historical   potassium chloride SR (KLOR-CON 10) 10 mEq tablet Take 10 mEq by mouth daily. Yes Provider, Historical   calcium carbonate/vitamin D3 (CALCIUM 600 + D,3, PO) Take  by mouth every other day. Yes Provider, Historical   diclofenac (Voltaren) 1 % gel Apply 2 g to affected area daily. R knee   Yes Provider, Historical   trolamine salicylate (ASPERCREME EX) by Apply Externally route every other day. Yes Provider, Historical   hydroCHLOROthiazide (HYDRODIURIL) 12.5 mg tablet TAKE ONE TABLET BY MOUTH DAILY 12/1/20  Yes Mariam Houser MD   metoprolol tartrate (LOPRESSOR) 50 mg tablet TAKE ONE TABLET BY MOUTH TWICE A DAY 4/24/20  Yes Mariam Houser MD   amLODIPine (NORVASC) 5 mg tablet Take 5 mg by mouth daily. Yes Provider, Historical   cyanocobalamin (VITAMIN B-12) 1,000 mcg tablet Take 1,000 mcg by mouth every other day. Yes Provider, Historical   acetaminophen (TYLENOL) 325 mg tablet Take  by mouth every four (4) hours as needed for Pain. Yes Provider, Historical   aspirin 81 mg tablet Take 1 Tab by mouth daily.  11/18/11  Yes Mariam Houser MD   multivitamin (ONE A DAY) tablet Take 1 Tab by mouth daily. 5/31/11  Yes Provider, Historical     Allergies   Allergen Reactions    Lisinopril Cough    Simvastatin Myalgia      Family History   Problem Relation Age of Onset    Other Mother         uterine fibroids    Post-op Nausea/Vomiting Mother     High Cholesterol Father     Coronary Artery Disease Father         SOCIAL HISTORY:  Patient resides:  Independently x   Assisted Living    SNF    With family care       Smoking history:   None x   Former    Chronic      Alcohol history:   None    Social x   Chronic      Ambulates:   Independently x   w/cane    w/walker    w/wc    CODE STATUS:  DNR    Full x   Other      Objective:     Physical Exam:     Visit Vitals  BP (!) 143/81 (BP 1 Location: Right arm, BP Patient Position: At rest)   Pulse (!) 56   Temp 97.8 °F (36.6 °C)   Resp 20   Ht 5' 2\" (1.575 m)   Wt 71 kg (156 lb 8.4 oz)   LMP 01/01/2003   SpO2 98%   BMI 28.63 kg/m²      O2 Device: Room air    General : alert x 3, awake, no acute distress, resting in bed, pleasant /female, appears to be stated age  [de-identified]: PEERL, EOMI, moist mucus membrane, TM clear  Neck: supple, no JVD, no meningeal signs  Chest: Decreased breath sound bilateral lung bases  CVS: S1 S2 heard, Capillary refill less than 2 seconds  Abd: soft/ Non tender, non distended, BS physiological,   Ext: no clubbing, no cyanosis, no edema, brisk 2+ DP pulses  Neuro/Psych: pleasant mood and affect, CN 2-12 grossly intact, sensory grossly within normal limit, Strength 5/5 in all extremities, DTR 1+ x 4  Skin: warm      EKG: Normal sinus rhythm with nonspecific ST changes    Data Review:     Recent Days:  Recent Labs     01/12/21  1425   WBC 9.8   HGB 14.0   HCT 39.5        Recent Labs     01/12/21  1425   *   K 3.7   CL 99   CO2 26   *   BUN 15   CREA 0.81   CA 9.1   ALB 3.3*   ALT 31     No results for input(s): PH, PCO2, PO2, HCO3, FIO2 in the last 72 hours.     24 Hour Results:  Recent Results (from the past 24 hour(s)) EKG, 12 LEAD, INITIAL    Collection Time: 01/12/21  2:09 PM   Result Value Ref Range    Ventricular Rate 69 BPM    Atrial Rate 69 BPM    P-R Interval 164 ms    QRS Duration 88 ms    Q-T Interval 406 ms    QTC Calculation (Bezet) 435 ms    Calculated P Axis 50 degrees    Calculated R Axis 14 degrees    Calculated T Axis 52 degrees    Diagnosis       Normal sinus rhythm  When compared with ECG of 03-APR-2020 01:20,  No significant change was found     CBC WITH AUTOMATED DIFF    Collection Time: 01/12/21  2:25 PM   Result Value Ref Range    WBC 9.8 3.6 - 11.0 K/uL    RBC 4.33 3.80 - 5.20 M/uL    HGB 14.0 11.5 - 16.0 g/dL    HCT 39.5 35.0 - 47.0 %    MCV 91.2 80.0 - 99.0 FL    MCH 32.3 26.0 - 34.0 PG    MCHC 35.4 30.0 - 36.5 g/dL    RDW 12.3 11.5 - 14.5 %    PLATELET 987 793 - 794 K/uL    MPV 8.6 (L) 8.9 - 12.9 FL    NRBC 0.0 0  WBC    ABSOLUTE NRBC 0.00 0.00 - 0.01 K/uL    NEUTROPHILS 66 32 - 75 %    LYMPHOCYTES 23 12 - 49 %    MONOCYTES 9 5 - 13 %    EOSINOPHILS 1 0 - 7 %    BASOPHILS 0 0 - 1 %    IMMATURE GRANULOCYTES 1 (H) 0.0 - 0.5 %    ABS. NEUTROPHILS 6.5 1.8 - 8.0 K/UL    ABS. LYMPHOCYTES 2.2 0.8 - 3.5 K/UL    ABS. MONOCYTES 0.9 0.0 - 1.0 K/UL    ABS. EOSINOPHILS 0.1 0.0 - 0.4 K/UL    ABS. BASOPHILS 0.0 0.0 - 0.1 K/UL    ABS. IMM. GRANS. 0.1 (H) 0.00 - 0.04 K/UL    DF AUTOMATED     METABOLIC PANEL, COMPREHENSIVE    Collection Time: 01/12/21  2:25 PM   Result Value Ref Range    Sodium 130 (L) 136 - 145 mmol/L    Potassium 3.7 3.5 - 5.1 mmol/L    Chloride 99 97 - 108 mmol/L    CO2 26 21 - 32 mmol/L    Anion gap 5 5 - 15 mmol/L    Glucose 118 (H) 65 - 100 mg/dL    BUN 15 6 - 20 MG/DL    Creatinine 0.81 0.55 - 1.02 MG/DL    BUN/Creatinine ratio 19 12 - 20      GFR est AA >60 >60 ml/min/1.73m2    GFR est non-AA >60 >60 ml/min/1.73m2    Calcium 9.1 8.5 - 10.1 MG/DL    Bilirubin, total 0.2 0.2 - 1.0 MG/DL    ALT (SGPT) 31 12 - 78 U/L    AST (SGOT) 23 15 - 37 U/L    Alk.  phosphatase 138 (H) 45 - 117 U/L Protein, total 7.6 6.4 - 8.2 g/dL    Albumin 3.3 (L) 3.5 - 5.0 g/dL    Globulin 4.3 (H) 2.0 - 4.0 g/dL    A-G Ratio 0.8 (L) 1.1 - 2.2     SAMPLES BEING HELD    Collection Time: 01/12/21  2:25 PM   Result Value Ref Range    SAMPLES BEING HELD 1RED, 1BLU     COMMENT        Add-on orders for these samples will be processed based on acceptable specimen integrity and analyte stability, which may vary by analyte. TROPONIN I    Collection Time: 01/12/21  2:25 PM   Result Value Ref Range    Troponin-I, Qt. 0.28 (H) <0.05 ng/mL         Imaging:   Xr Chest Port    Result Date: 1/12/2021  IMPRESSION: No acute process identified. .     Assessment/Plan      Elevated troponin: Atypical, does not appear to be true cardiac event, likely demand ischemia, cycle troponins, aspirin, CT of the chest to rule out pulmonary embolism, oxygen support, pulse ox monitoring, telemetry, cardiology consult, supportive care, echocardiogram, reassess as needed    COVID-19 infection: Maintaining O2 sats on room air, does not meet the criteria for dexamethasone, vitamin C and zinc have been ordered, supportive care, close monitoring, still intervention per hospital course, reassess as needed      Hyperlipidemia: Continue home medications and continue monitor    Hypertension: Continue home medications and continue monitor    Hypothyroidism continue home medication and continue monitor    DVT prophylaxis: Patient will be on heparin             Signed By: Kemar Sánchez MD     January 12, 2021

## 2021-01-12 NOTE — PROGRESS NOTES
Admission Medication Reconciliation:        Spoke with patient by telephone @ [phone], unable to speak with patient face to face at this time due to general isolation precautions in the ED related to COVID-19 pandemic. She is a reliable historian, although difficult to understand at times (she will repeat answers when asked). RX query is available at this time. Interview included questions regarding use of:  PTA medications including prescription/OTC, vitamins, supplements, inhaled, topical, injectable, otic and ophthalmic medications    Medication changes (since last review): Added:  Potassium supplement    Revised:  Levothyroxine to 125 mcg dose    Deleted:  Famotidine  Lovastatin    Thank you for allowing me to participate in the care of your patient. Greer Jaramillo PharmD, RN # [phone]       [facility] pharmacy benefit data reflects medications filled and processed through the patient's insurance, however   this data does NOT capture whether the medication was picked up or is currently being taken by the patient. Allergies:  Lisinopril and Simvastatin    Significant PMH/Disease States:   Past Medical History:   Diagnosis Date    GERD (gastroesophageal reflux disease)     Hypercholesterolemia     Hyperlipidemia 5/26/2011    Hypertension     Hypothyroid     Microscopic hematuria 1/2011    NAVDEEP- Dr Renea Andrews, w/u negative    Other ill-defined conditions(799.89)     states may have sleep apnea but never tested     Chief Complaint for this Admission:    Chief Complaint   Patient presents with    Irregular Heart Beat    Chest Pain    Positive For Covid-19     Prior to Admission Medications:   Prior to Admission Medications   Prescriptions Last Dose Informant Taking?   acetaminophen (TYLENOL) 325 mg tablet   Yes   Sig: Take  by mouth every four (4) hours as needed for Pain. amLODIPine (NORVASC) 5 mg tablet 1/12/2021 at Unknown time  Yes   Sig: Take 5 mg by mouth daily.    aspirin 81 mg tablet 1/12/2021 at Unknown time  Yes   Sig: Take 1 Tab by mouth daily. calcium carbonate/vitamin D3 (CALCIUM 600 + D,3, PO) 1/11/2021 at Unknown time  Yes   Sig: Take  by mouth every other day. cyanocobalamin (VITAMIN B-12) 1,000 mcg tablet 1/11/2021 at Unknown time  Yes   Sig: Take 1,000 mcg by mouth every other day. diclofenac (Voltaren) 1 % gel 12/29/2020  Yes   Sig: Apply 2 g to affected area daily. R knee   hydroCHLOROthiazide (HYDRODIURIL) 12.5 mg tablet 1/12/2021 at Unknown time  Yes   Sig: TAKE ONE TABLET BY MOUTH DAILY   levothyroxine (SYNTHROID) 125 mcg tablet 1/12/2021 at Unknown time  Yes   Sig: Take 125 mcg by mouth Daily (before breakfast). metoprolol tartrate (LOPRESSOR) 50 mg tablet 1/12/2021 at Unknown time  Yes   Sig: TAKE ONE TABLET BY MOUTH TWICE A DAY   multivitamin (ONE A DAY) tablet 1/12/2021 at Unknown time  Yes   Sig: Take 1 Tab by mouth daily. potassium chloride SR (KLOR-CON 10) 10 mEq tablet 1/12/2021 at Unknown time  Yes   Sig: Take 10 mEq by mouth daily. trolamine salicylate (ASPERCREME EX) 1/11/2021 at Unknown time  Yes   Sig: by Apply Externally route every other day. Facility-Administered Medications: None     Please contact the main inpatient pharmacy with any questions or concerns at [phone] and we will direct you to the clinical pharmacist covering this patient's care while in-house.    MIGUEL ANGEL Washington

## 2021-01-13 ENCOUNTER — APPOINTMENT (OUTPATIENT)
Dept: NON INVASIVE DIAGNOSTICS | Age: 66
End: 2021-01-13
Attending: FAMILY MEDICINE
Payer: MEDICARE

## 2021-01-13 VITALS
HEART RATE: 64 BPM | DIASTOLIC BLOOD PRESSURE: 89 MMHG | BODY MASS INDEX: 28.89 KG/M2 | SYSTOLIC BLOOD PRESSURE: 138 MMHG | TEMPERATURE: 98.2 F | OXYGEN SATURATION: 98 % | HEIGHT: 62 IN | WEIGHT: 157 LBS | RESPIRATION RATE: 18 BRPM

## 2021-01-13 LAB
BASOPHILS # BLD: 0 K/UL (ref 0–0.1)
BASOPHILS NFR BLD: 1 % (ref 0–1)
DIFFERENTIAL METHOD BLD: ABNORMAL
EOSINOPHIL # BLD: 0.1 K/UL (ref 0–0.4)
EOSINOPHIL NFR BLD: 1 % (ref 0–7)
ERYTHROCYTE [DISTWIDTH] IN BLOOD BY AUTOMATED COUNT: 12.4 % (ref 11.5–14.5)
HCT VFR BLD AUTO: 40.4 % (ref 35–47)
HGB BLD-MCNC: 13.9 G/DL (ref 11.5–16)
IMM GRANULOCYTES # BLD AUTO: 0 K/UL (ref 0–0.04)
IMM GRANULOCYTES NFR BLD AUTO: 1 % (ref 0–0.5)
LYMPHOCYTES # BLD: 1.9 K/UL (ref 0.8–3.5)
LYMPHOCYTES NFR BLD: 23 % (ref 12–49)
MCH RBC QN AUTO: 31.8 PG (ref 26–34)
MCHC RBC AUTO-ENTMCNC: 34.4 G/DL (ref 30–36.5)
MCV RBC AUTO: 92.4 FL (ref 80–99)
MONOCYTES # BLD: 0.8 K/UL (ref 0–1)
MONOCYTES NFR BLD: 9 % (ref 5–13)
NEUTS SEG # BLD: 5.4 K/UL (ref 1.8–8)
NEUTS SEG NFR BLD: 65 % (ref 32–75)
NRBC # BLD: 0 K/UL (ref 0–0.01)
NRBC BLD-RTO: 0 PER 100 WBC
PLATELET # BLD AUTO: 349 K/UL (ref 150–400)
PMV BLD AUTO: 8.6 FL (ref 8.9–12.9)
RBC # BLD AUTO: 4.37 M/UL (ref 3.8–5.2)
SARS-COV-2, COV2: DETECTED
SPECIMEN SOURCE, FCOV2M: ABNORMAL
TROPONIN I SERPL-MCNC: 0.35 NG/ML
TSH SERPL DL<=0.05 MIU/L-ACNC: 11.4 UIU/ML (ref 0.36–3.74)
WBC # BLD AUTO: 8.2 K/UL (ref 3.6–11)

## 2021-01-13 PROCEDURE — 93306 TTE W/DOPPLER COMPLETE: CPT

## 2021-01-13 PROCEDURE — 36415 COLL VENOUS BLD VENIPUNCTURE: CPT

## 2021-01-13 PROCEDURE — 74011250637 HC RX REV CODE- 250/637: Performed by: FAMILY MEDICINE

## 2021-01-13 PROCEDURE — 99218 HC RM OBSERVATION: CPT

## 2021-01-13 PROCEDURE — 74011250637 HC RX REV CODE- 250/637: Performed by: INTERNAL MEDICINE

## 2021-01-13 PROCEDURE — 85025 COMPLETE CBC W/AUTO DIFF WBC: CPT

## 2021-01-13 PROCEDURE — 84443 ASSAY THYROID STIM HORMONE: CPT

## 2021-01-13 PROCEDURE — 84484 ASSAY OF TROPONIN QUANT: CPT

## 2021-01-13 RX ORDER — METOPROLOL TARTRATE 50 MG/1
50 TABLET ORAL 2 TIMES DAILY
Status: DISCONTINUED | OUTPATIENT
Start: 2021-01-13 | End: 2021-01-13 | Stop reason: HOSPADM

## 2021-01-13 RX ORDER — VITAMIN E 1000 UNIT
1000 CAPSULE ORAL DAILY
Qty: 10 TAB | Refills: 0 | Status: SHIPPED | OUTPATIENT
Start: 2021-01-14 | End: 2021-01-24

## 2021-01-13 RX ORDER — LEVOTHYROXINE SODIUM 137 UG/1
137 TABLET ORAL
Qty: 30 TAB | Refills: 0 | Status: SHIPPED | OUTPATIENT
Start: 2021-01-13 | End: 2021-02-11 | Stop reason: SDUPTHER

## 2021-01-13 RX ORDER — ZINC SULFATE 50(220)MG
220 CAPSULE ORAL DAILY
Qty: 10 CAP | Refills: 0 | Status: SHIPPED | OUTPATIENT
Start: 2021-01-14 | End: 2021-01-24

## 2021-01-13 RX ORDER — LEVOTHYROXINE SODIUM 150 UG/1
150 TABLET ORAL
Status: DISCONTINUED | OUTPATIENT
Start: 2021-01-14 | End: 2021-01-13 | Stop reason: HOSPADM

## 2021-01-13 RX ADMIN — ASPIRIN 81 MG: 81 TABLET, CHEWABLE ORAL at 09:45

## 2021-01-13 RX ADMIN — ZINC SULFATE 220 MG (50 MG) CAPSULE 1 CAPSULE: CAPSULE at 09:44

## 2021-01-13 RX ADMIN — THERA TABS 1 TABLET: TAB at 09:45

## 2021-01-13 RX ADMIN — OXYCODONE HYDROCHLORIDE AND ACETAMINOPHEN 1000 MG: 500 TABLET ORAL at 09:45

## 2021-01-13 RX ADMIN — METOPROLOL TARTRATE 50 MG: 50 TABLET, FILM COATED ORAL at 09:45

## 2021-01-13 RX ADMIN — AMLODIPINE BESYLATE 5 MG: 5 TABLET ORAL at 09:45

## 2021-01-13 RX ADMIN — LEVOTHYROXINE SODIUM 125 MCG: 0.12 TABLET ORAL at 07:44

## 2021-01-13 NOTE — ED NOTES
Bedside and Verbal shift change report given to Mely Nichols (oncoming nurse) by Juliette PICKERING (offgoing nurse). Report included the following information SBAR, Kardex, ED Summary, MAR and Recent Results.

## 2021-01-13 NOTE — PROGRESS NOTES
[facility] visit. Mr. Tay Terry is [address]. He is unable to receive communion due to medical restrictions. Prayer for spiritual communion offered.     LETI Lancaster, RN, ACSW, LCSW   Page:  [phone]

## 2021-01-13 NOTE — PROGRESS NOTES
Notified about troponin trend. Admitted earlier today w palpitations, myalgias and pleuritis c/p. Cards has been consulted. V/S stable. Patient Vitals for the past 12 hrs:   Temp Pulse Resp BP SpO2   01/12/21 2100 -- 66 18 (!) 153/89 96 %   01/12/21 2015 -- 68 19 -- 98 %   01/12/21 2000 -- 61 18 (!) 146/95 --   01/12/21 1800 97.8 °F (36.6 °C) (!) 56 20 (!) 143/81 98 %   01/12/21 1400 97.9 °F (36.6 °C) 69 20 (!) 173/101 98 %       Con't current plan of care. Please notify for chests pain, palpitations, worsening SOB.       Macey Noonan, MPH, MSN, RN, NP-C  [phone] or via Perfect Serve

## 2021-01-13 NOTE — ROUTINE PROCESS
[facility] follow-up PCP transitional care appointment has been scheduled with [facility] [place] for Jan 21 @ 1030AM (ealriest available). Pending patient discharge.   Bruno Duenas, Care Management Specialist.

## 2021-01-13 NOTE — ROUTINE PROCESS
TRANSFER - OUT REPORT:    Verbal report given to [address] RN(name) on Myesha Soriano  being transferred to [facility]) for routine progression of care       Report consisted of patients Situation, Background, Assessment and   Recommendations(SBAR). Information from the following report(s) SBAR, Kardex, ED Summary, [facility] and Recent Results was reviewed with the receiving nurse. Lines:   Peripheral IV 01/12/21 Right Antecubital (Active)   Site Assessment Clean, dry, & intact 01/12/21 1840   Phlebitis Assessment 0 01/12/21 1840   Infiltration Assessment 0 01/12/21 1840   Dressing Status Clean, dry, & intact 01/12/21 1840        Opportunity for questions and clarification was provided.       Patient transported with:   [employer]

## 2021-01-13 NOTE — DISCHARGE SUMMARY
Discharge Summary     PATIENT ID: Glory Salcedo  MRN: [id number]   YOB: 1955    DATE OF ADMISSION: 1/12/2021  4:10 PM    DATE OF DISCHARGE: 1/13/2021  PRIMARY CARE PROVIDER: Nohemi Velazquez MD   DISCHARGING PROVIDER: Kolby Sotelo MD    To contact this individual call [phone] and ask the  to page. If unavailable ask to be transferred the Adult Hospitalist Department. CONSULTATIONS: None    PROCEDURES/SURGERIES: * No surgery found *    ADMITTING DIAGNOSES & HOSPITAL COURSE:   Patient reports that she was diagnosed with COVID-19 infection on December 27. Patient reports that he continues to have some myalgias and shortness of breath off and on. Patient denies any fever but reports that she has night sweats. Patient reports that she has had some pleuritic chest pain off and on but currently does not have any pain. Patient reports she was seen at [facility] on Friday and was told to take analgesics and was discharged home. Patient came back to the ER today because her symptoms persisted. Patient was seen in April and was found to have atrial tachycardia and had a catheterization done. The coronaries did not show any obstructive lesion at that point of time, she was found to have some elevated troponin which was attributed to her arrhythmia. Patient came to the ER today was found to have a troponin of 0.20 and was requested to be admitted to the hospitalist service. Patient denies any other complaints or problems.     DISCHARGE DIAGNOSES / PLAN:      SOB, palpitations, resolved shortly after admission  Likely 2/2 atrial tachycardia, resolved  Pt feels well and wants to d/c home  On RA    Mild elev trop peaked at 0.36 likely 2/2 tachycardia/arrhythmia or demand ischemia  EKG with NSR  CTA neg for PE  Cont ASA    Atrial tachycardia   Pt had mild elev trop (also up to 0.36) in April s/p heart cath which did not show any obstructive lesions and normal LEVDP so trop was attributed to her arrhythmia  Cont BB bid    COVID without hypoxia  On RA  Afebrile, wbc wnl  Vitamins  Cont supportive care     Hyperlipidemia: Continue home medications     Hypertension: Continue home medications     Hypothyroidism continue home medication  TSH elevated at 11 so increase LT4 from 125 to 137     ADDITIONAL CARE RECOMMENDATIONS: f/u with PCP (monitor BP, HR, lytes, f/u TSH in 4-6 weeks) and cardiology (f/u atrial tachycardia)    Patient Instructions   You may discontinue HOME ISOLATION/QUARANTINE after 10 DAYS (1/23/21), as long as symptoms have resolved and you do not have a fever or require any fever-reducing medications    PENDING TEST RESULTS:   At the time of discharge the following test results are still pending: none    FOLLOW UP APPOINTMENTS:    Follow-up Information     Follow up With Specialties Details Why Contact Info    Estephanie Miranda MD Internal Medicine   [address] [place] [address]  [phone]             DIET: Cardiac Diet    ACTIVITY: Activity as tolerated    NOTIFY YOUR PHYSICIAN FOR ANY OF THE FOLLOWING:   Fever over 101 degrees for 24 hours. Chest pain, shortness of breath, fever, chills, nausea, vomiting, diarrhea, change in mentation, falling, weakness, bleeding. Severe pain or pain not relieved by medications. Or, any other signs or symptoms that you may have questions about. DISPOSITION:  x  Home With:self   OT  PT  HH  RN       Long term SNF/Inpatient Rehab    Independent/assisted living    Hospice    Other:       PATIENT CONDITION AT DISCHARGE:   Functional status    Poor     Deconditioned    x Independent      Cognition   x  Lucid     Forgetful     Dementia      Catheters/lines (plus indication)    [place]     PICC     PEG    x None      Code status   x  Full code     DNR      PHYSICAL EXAMINATION AT DISCHARGE:  General:  Alert, cooperative, no distress, appears stated age. Back:    Symmetric, ROM normal. No CVA tenderness.   Lungs:   Clear to auscultation bilaterally, symmetric expansion, no respiratory distress  Chest wall:  No tenderness or deformity. Heart:   Regular rate and rhythm, no murmur  Abdomen:   Soft, non-tender  Extremities: Extremities normal, atraumatic, no cyanosis or edema  Skin:  Skin color, texture, turgor normal. No rashes or lesions. Neurologic: CNII-XII intact.  Normal strength and sensation throughout    [address]:  Problem List as of 1/13/2021 Date Reviewed: 12/18/2020          Codes Class Noted - Resolved    Elevated troponin ICD-10-CM: R77.8  ICD-9-CM: 790.6  1/12/2021 - Present        Osteopenia of left hip ICD-10-CM: M85.852  ICD-9-CM: 733.90  9/17/2020 - Present        Palpitation ICD-10-CM: R00.2  ICD-9-CM: 785.1  4/3/2020 - Present        Abnormal mammogram of right breast ICD-10-CM: R92.8  ICD-9-CM: 793.80  9/19/2017 - Present        Atrial tachycardia ([address] ICD-10-CM: I47.1  ICD-9-CM: 427.89  11/9/2012 - Present        GERD (gastroesophageal reflux disease) ICD-10-CM: K21.9  ICD-9-CM: 530.81  Unknown - Present        Microscopic hematuria ICD-10-CM: R31.29  ICD-9-CM: 599.72  1/1/2011 - Present    Overview Signed 1/21/2011  7:27 AM by Virginia Conley MD     - Dr Laly De La Torre, w/u negative             Hypertension, essential ICD-10-CM: I10  ICD-9-CM: 401.9  Unknown - Present    Overview Addendum 4/24/2020 11:17 AM by Virginia Conley MD     Did not tolerate lisinopril (cough), amlodipine (? Swelling), HCTZ (25mg due to dizziness, okay on 12.5mg)             Hypothyroid ICD-10-CM: E03.9  ICD-9-CM: 244.9  9/23/2010 - Present        RESOLVED: Elevated troponin ICD-10-CM: R77.8  ICD-9-CM: 790.6  4/3/2020 - 4/21/2020        RESOLVED: Palpitations ICD-10-CM: R00.2  ICD-9-CM: 785.1  10/11/2012 - 9/19/2017        RESOLVED: Dizziness ICD-10-CM: R42  ICD-9-CM: 780.4  10/11/2012 - 8/27/2015        RESOLVED: Urinary frequency ICD-10-CM: R35.0  ICD-9-CM: 788.41  10/4/2012 - 5/3/2013        RESOLVED: Status post laparoscopic cholecystectomy ICD-10-CM: Z90.49  ICD-9-CM: V45.89  Unknown - 10/4/2012        RESOLVED: Hyperlipidemia ICD-10-CM: E78.5  ICD-9-CM: 272.4  5/26/2011 - 10/28/2016              DISCHARGE MEDICATIONS:  Current Discharge Medication List      START taking these medications    Details   ascorbic acid, vitamin C, (VITAMIN C) 1,000 mg tablet Take 1 Tab by mouth daily for 10 days. Qty: 10 Tab, Refills: 0      zinc sulfate (ZINCATE) 220 (50) mg capsule Take 1 Cap by mouth daily for 10 days. Qty: 10 Cap, Refills: 0         CONTINUE these medications which have CHANGED    Details   levothyroxine (SYNTHROID) 137 mcg tablet Take 137 mcg by mouth Daily (before breakfast). Qty: 30 Tab, Refills: 0         CONTINUE these medications which have NOT CHANGED    Details   calcium carbonate/vitamin D3 (CALCIUM 600 + D,3, PO) Take  by mouth every other day. diclofenac (Voltaren) 1 % gel Apply 2 g to affected area daily. R knee      trolamine salicylate (ASPERCREME EX) by Apply Externally route every other day. metoprolol tartrate (LOPRESSOR) 50 mg tablet TAKE ONE TABLET BY MOUTH TWICE A DAY  Qty: 180 Tab, Refills: 1    Associated Diagnoses: Hypertension, essential; Atrial tachycardia (HCC)      amLODIPine (NORVASC) 5 mg tablet Take 5 mg by mouth daily. cyanocobalamin (VITAMIN B-12) 1,000 mcg tablet Take 1,000 mcg by mouth every other day. acetaminophen (TYLENOL) 325 mg tablet Take  by mouth every four (4) hours as needed for Pain. aspirin 81 mg tablet Take 1 Tab by mouth daily. multivitamin (ONE A DAY) tablet Take 1 Tab by mouth daily.          STOP taking these medications       potassium chloride SR (KLOR-CON 10) 10 mEq tablet Comments:   Reason for Stopping:         hydroCHLOROthiazide (HYDRODIURIL) 12.5 mg tablet Comments:   Reason for Stopping:             Greater than 30 minutes were spent with the patient on counseling and coordination of care    Signed:   Asuncion Dominguez MD  1/13/2021  10:06 AM

## 2021-01-13 NOTE — DISCHARGE INSTRUCTIONS
Take all medications as prescribed    You may discontinue HOME ISOLATION/QUARANTINE after 10 DAYS (1/23/21), as long as symptoms have resolved and you do not have a fever or require any fever-reducing medications

## 2021-01-13 NOTE — PROGRESS NOTES
Bedside shift change report given to Ale Perez (oncoming nurse) by Roselyn De La Cruz (offgoing nurse). Report included the following information SBAR, Kardex, ED Summary, MAR, Recent Results and Cardiac Rhythm NSR. Last 3 Recorded Weights in this Encounter    01/12/21 1400 01/13/21 0410   Weight: 71 kg (156 lb 8.4 oz) 71.4 kg (157 lb 6.5 oz)         Primary Nurse Justin Hernández and LADAN Ryan performed a dual skin assessment on this patient No impairment noted  Johan score is 23      TRANSFER - IN REPORT:    Verbal report received from Cyndi Roe (name) on Allyson Jeter  being received from ED(unit) for routine progression of care      Report consisted of patients Situation, Background, Assessment and   Recommendations(SBAR). Information from the following report(s) SBAR, Kardex, ED Summary, MAR, Recent Results and Cardiac Rhythm NSR was reviewed with the receiving nurse. Opportunity for questions and clarification was provided. Assessment completed upon patients arrival to unit and care assumed.

## 2021-01-14 ENCOUNTER — PATIENT OUTREACH (OUTPATIENT)
Dept: CASE MANAGEMENT | Age: 66
End: 2021-01-14

## 2021-01-14 LAB
ECHO AO ROOT DIAM: 2.75 CM
ECHO AV AREA PEAK VELOCITY: 2.22 CM2
ECHO AV AREA/BSA PEAK VELOCITY: 1.3 CM2/M2
ECHO AV PEAK GRADIENT: 10.3 MMHG
ECHO AV PEAK VELOCITY: 160.46 CM/S
ECHO LA AREA 4C: 14.84 CM2
ECHO LA MAJOR AXIS: 3.73 CM
ECHO LA MINOR AXIS: 2.16 CM
ECHO LA VOL 2C: 35.75 ML (ref 22–52)
ECHO LA VOL 4C: 33.23 ML (ref 22–52)
ECHO LA VOL BP: 37.4 ML (ref 22–52)
ECHO LA VOL/BSA BIPLANE: 21.69 ML/M2 (ref 16–28)
ECHO LA VOLUME INDEX A2C: 20.73 ML/M2 (ref 16–28)
ECHO LA VOLUME INDEX A4C: 19.27 ML/M2 (ref 16–28)
ECHO LV E' LATERAL VELOCITY: 8.62 CM/S
ECHO LV E' SEPTAL VELOCITY: 8.3 CM/S
ECHO LV INTERNAL DIMENSION DIASTOLIC: 4.96 CM (ref 3.9–5.3)
ECHO LV INTERNAL DIMENSION SYSTOLIC: 2.99 CM
ECHO LV IVSD: 0.73 CM (ref 0.6–0.9)
ECHO LV MASS 2D: 120.3 G (ref 67–162)
ECHO LV MASS INDEX 2D: 69.7 G/M2 (ref 43–95)
ECHO LV POSTERIOR WALL DIASTOLIC: 0.74 CM (ref 0.6–0.9)
ECHO LVOT DIAM: 1.97 CM
ECHO LVOT PEAK GRADIENT: 5.48 MMHG
ECHO LVOT PEAK VELOCITY: 117.02 CM/S
ECHO MV A VELOCITY: 89.47 CM/S
ECHO MV AREA PHT: 2.73 CM2
ECHO MV E DECELERATION TIME (DT): 278.04 MS
ECHO MV E VELOCITY: 56.33 CM/S
ECHO MV E/A RATIO: 0.63
ECHO MV E/E' LATERAL: 6.53
ECHO MV E/E' RATIO (AVERAGED): 6.66
ECHO MV E/E' SEPTAL: 6.79
ECHO MV PRESSURE HALF TIME (PHT): 80.63 MS
ECHO PV MAX VELOCITY: 110.62 CM/S
ECHO PV PEAK INSTANTANEOUS GRADIENT SYSTOLIC: 4.89 MMHG
ECHO RV INTERNAL DIMENSION: 3.47 CM
ECHO RV TAPSE: 1.9 CM (ref 1.5–2)

## 2021-01-21 ENCOUNTER — VIRTUAL VISIT (OUTPATIENT)
Dept: INTERNAL MEDICINE CLINIC | Age: 66
End: 2021-01-21
Payer: MEDICARE

## 2021-01-21 DIAGNOSIS — I10 HYPERTENSION, ESSENTIAL: ICD-10-CM

## 2021-01-21 DIAGNOSIS — F41.8 ANXIETY WITH DEPRESSION: ICD-10-CM

## 2021-01-21 DIAGNOSIS — Z86.16 HISTORY OF COVID-19: ICD-10-CM

## 2021-01-21 DIAGNOSIS — E03.4 HYPOTHYROIDISM DUE TO ACQUIRED ATROPHY OF THYROID: Primary | ICD-10-CM

## 2021-01-21 DIAGNOSIS — Z09 HOSPITAL DISCHARGE FOLLOW-UP: ICD-10-CM

## 2021-01-21 DIAGNOSIS — G47.00 INSOMNIA, UNSPECIFIED TYPE: ICD-10-CM

## 2021-01-21 DIAGNOSIS — E87.1 HYPONATREMIA: ICD-10-CM

## 2021-01-21 DIAGNOSIS — Z79.82 ASPIRIN LONG-TERM USE: ICD-10-CM

## 2021-01-21 PROBLEM — R77.8 ELEVATED TROPONIN LEVEL: Status: ACTIVE | Noted: 2021-01-21

## 2021-01-21 PROCEDURE — G8427 DOCREV CUR MEDS BY ELIG CLIN: HCPCS | Performed by: NURSE PRACTITIONER

## 2021-01-21 PROCEDURE — 99495 TRANSJ CARE MGMT MOD F2F 14D: CPT | Performed by: NURSE PRACTITIONER

## 2021-01-21 PROCEDURE — 1111F DSCHRG MED/CURRENT MED MERGE: CPT | Performed by: NURSE PRACTITIONER

## 2021-01-21 RX ORDER — TRAZODONE HYDROCHLORIDE 50 MG/1
50 TABLET ORAL
Qty: 30 TAB | Refills: 1 | Status: SHIPPED
Start: 2021-01-21 | End: 2021-09-10

## 2021-01-21 NOTE — PROGRESS NOTES
Virtual Video Transitional Care Management Progress Note    Patient: Sole Recio  : 1955  PCP: Hanh Rivas MD    Date of office visit: 2021   Date of admission: 21  Date of discharge: 21  Hospital: [facility]    Call initiated w/i 2 business dates of discharge: Yes   Date of the most recent call to the patient: 2021 12:14 PM      Assessment/Plan:     Diagnoses and all orders for this visit:    1. Hypothyroidism due to acquired atrophy of thyroid  -     TSH 3RD GENERATION; Future  -     T4, FREE; Future    2. Hypertension, essential  -     METABOLIC PANEL, COMPREHENSIVE; Future  -     CBC WITH AUTOMATED DIFF; Future    3. Hyponatremia  -     METABOLIC PANEL, COMPREHENSIVE; Future    4. History of COVID-19  -     CBC WITH AUTOMATED DIFF; Future    5. Hospital discharge follow-up  -     ME DISCHARGE MEDS RECONCILED W/ CURRENT OUTPATIENT MED LIST    6. Aspirin long-term use  -     CBC WITH AUTOMATED DIFF; Future    7. Anxiety with depression  -     traZODone (DESYREL) 50 mg tablet; Take 1 Tab by mouth nightly. 8. Insomnia, unspecified type  -     traZODone (DESYREL) 50 mg tablet; Take 1 Tab by mouth nightly. Hypothyroidism-Continue levothyroxine 137 mg daily. Plan follow up TSH and free T4 in 6-8 weeks. Labs ordered. Hyponatremia-Advised decreased water intake to no more than 4 bottles. May drink Gatorade also. Will plan repeat CMP next Monday/Tuesday-lab ordered. Hx of Covid/on daily ASA-will repeat CBC next week. Symptoms improving. May start on saline nasal spray for c/o dry nose as to avoid nosebleed. Insomnia/Anxiety/Depressed mood-will start on Trazodone 50 mg at HS-educated about medication. Aware if no improvement or if any worsening to notify the office. Counseling and support given. Fears over health. HTN-home BP slightly above goal today. Suspect some component of anxiety. Support given. Starting on Trazodone. Labs ordered.  For now will continue current antihypertensives. Mildly elevated troponin-hospital workup otherwise negative. Has follow up planned with Cardiology next week. No acute complaints or s/s. The complexity of medical decision making for this patient's transitional care is moderate   Follow-up and Dispositions    · Return in 3 months (on 4/21/2021), or if symptoms worsen or fail to improve. Subjective:   Javy Tadeo is a 72 y.o. female presenting today for follow-up after hospital discharge. This encounter and supporting documentation was reviewed if available. Medication reconciliation was performed today. The main problem requiring admission was Covid 19 infection with SOB, CP, fatigue, and elevated troponin. EKG with NSR, CTA negative for PE. She was also found to have elevated TSH of 11. Complications during admission: none      Interval history/Current status:   Covid 19/elevated troponin: Stable-Denies fevers, cough improving, CP resolved. She does admit continues to feel tired, dry nose, continues to take ASA 81 mg daily. Has follow up on Monday with cardiology-Dr. Leatha Epley. Hypothyroidism/Hyponatremia-States feels like has \"foggy head\" and fatigue. Confirmed she has increased her levothyroxine as ordered during hospitalization from 125 mcg to 137 mcg daily-taking in the AM on empty stomach. Noted most recent CMP showed NA low at 130. Admits had increased water intake when became sick to 6 glasses of water daily plus coconut water. Today also complaints of heightened anxiety, mild depressed mood, and difficulty sleeping. States Melatonin makes her too sleepy the next day. Requesting \"something for my nerves\". Had been on medication previously, but cannot remember name of med-long time ago. Admitting symptoms have: improved  She denies current fever, SOB, CP, palpitations, Abd pain, N/V/D/C, weakness, leg swelling, difficulty walking, rash or changes in skin.     Medications marked \"taking\" at this time:  Prior to Admission medications    Medication Sig Start Date End Date Taking? Authorizing Provider   levothyroxine (SYNTHROID) 137 mcg tablet Take 137 mcg by mouth Daily (before breakfast). 1/13/21  Yes Scottie Miramontes MD   ascorbic acid, vitamin C, (VITAMIN C) 1,000 mg tablet Take 1 Tab by mouth daily for 10 days. 1/14/21 1/24/21 Yes Scottie Miramontes MD   zinc sulfate (ZINCATE) 220 (50) mg capsule Take 1 Cap by mouth daily for 10 days. 1/14/21 1/24/21 Yes Scottie Miramontes MD   calcium carbonate/vitamin D3 (CALCIUM 600 + D,3, PO) Take  by mouth every other day. Yes Provider, Historical   diclofenac (Voltaren) 1 % gel Apply 2 g to affected area daily. R knee   Yes Provider, Historical   metoprolol tartrate (LOPRESSOR) 50 mg tablet TAKE ONE TABLET BY MOUTH TWICE A DAY 4/24/20  Yes Anjel Lemus MD   amLODIPine (NORVASC) 5 mg tablet Take 5 mg by mouth daily. Yes Provider, Historical   cyanocobalamin (VITAMIN B-12) 1,000 mcg tablet Take 1,000 mcg by mouth every other day. Yes Provider, Historical   aspirin 81 mg tablet Take 1 Tab by mouth daily. 11/18/11  Yes Anjel Lemus MD   multivitamin (ONE A DAY) tablet Take 1 Tab by mouth daily. 5/31/11  Yes Provider, Historical   trolamine salicylate (ASPERCREME EX) by Apply Externally route every other day. Provider, Historical   acetaminophen (TYLENOL) 325 mg tablet Take  by mouth every four (4) hours as needed for Pain.     Provider, Historical        ROS See HPI      Patient Active Problem List   Diagnosis Code    Hypertension, essential I5    Hypothyroid E03.9    Microscopic hematuria R31.29    GERD (gastroesophageal reflux disease) K21.9    Atrial tachycardia (HCC) I47.1    Abnormal mammogram of right breast R92.8    Palpitation R00.2    Osteopenia of left hip M85.852    Elevated troponin R77.8    Elevated troponin level R77.8     Current Outpatient Medications   Medication Sig Dispense Refill    traZODone (DESYREL) 50 mg tablet Take 1 Tab by mouth nightly. 30 Tab 1    levothyroxine (SYNTHROID) 137 mcg tablet Take 137 mcg by mouth Daily (before breakfast). 30 Tab 0    ascorbic acid, vitamin C, (VITAMIN C) 1,000 mg tablet Take 1 Tab by mouth daily for 10 days. 10 Tab 0    zinc sulfate (ZINCATE) 220 (50) mg capsule Take 1 Cap by mouth daily for 10 days. 10 Cap 0    calcium carbonate/vitamin D3 (CALCIUM 600 + D,3, PO) Take  by mouth every other day.  diclofenac (Voltaren) 1 % gel Apply 2 g to affected area daily. R knee      metoprolol tartrate (LOPRESSOR) 50 mg tablet TAKE ONE TABLET BY MOUTH TWICE A  Tab 1    amLODIPine (NORVASC) 5 mg tablet Take 5 mg by mouth daily.  cyanocobalamin (VITAMIN B-12) 1,000 mcg tablet Take 1,000 mcg by mouth every other day.  aspirin 81 mg tablet Take 1 Tab by mouth daily.  multivitamin (ONE A DAY) tablet Take 1 Tab by mouth daily.  trolamine salicylate (ASPERCREME EX) by Apply Externally route every other day.  acetaminophen (TYLENOL) 325 mg tablet Take  by mouth every four (4) hours as needed for Pain.        Allergies   Allergen Reactions    Lisinopril Cough    Simvastatin Myalgia     Past Medical History:   Diagnosis Date    GERD (gastroesophageal reflux disease)     Hypercholesterolemia     Hyperlipidemia 5/26/2011    Hypertension     Hypothyroid     Microscopic hematuria 1/2011    - Dr Laly De La Torre, w/u negative    Other ill-defined conditions(799.89)     states may have sleep apnea but never tested     Past Surgical History:   Procedure Laterality Date    HX CHOLECYSTECTOMY  6/2/11    HX COLONOSCOPY  6/21/12    HX HEART CATHETERIZATION  04/03/2020    Minor coronary disease--non obstructive with no angiographic evidence of acute plaque rupture    HX HERNIA REPAIR      HX HYSTERECTOMY  2003    for fibroids, cervix is present           Objective:     Patient-Reported Vitals 1/21/2021   Patient-Reported Systolic  995   Patient-Reported Diastolic 87        Physical Exam  Vitals signs reviewed. Constitutional:       General: She is not in acute distress. Appearance: She is well-groomed. HENT:      Head: Normocephalic and atraumatic. Neck:      Musculoskeletal: Neck supple. Pulmonary:      Effort: Pulmonary effort is normal. No respiratory distress. Comments: No cough. Talking in full sentences. Neurological:      Mental Status: She is alert and oriented to person, place, and time. Psychiatric:         Attention and Perception: Attention normal.         Mood and Affect: Mood is anxious and depressed. Speech: Speech normal.         Behavior: Behavior is cooperative. Cognition and Memory: Cognition normal.            We discussed the expected course, resolution and complications of the diagnosis(es) in detail. Medication risks, benefits, costs, interactions, and alternatives were discussed as indicated. I advised her to contact the office if her condition worsens, changes or fails to improve as anticipated. She expressed understanding with the diagnosis(es) and plan. Miesha Wiggins, who was evaluated through a synchronous (real-time) audio-video encounter and/or her healthcare decision maker, is aware that it is a billable service, with coverage as determined by her insurance carrier. She provided verbal consent to proceed: Yes, and patient identification was verified. It was conducted pursuant to the emergency declaration under the [facility], [address] [place] [address] authority and the [employer] Act. A caregiver was present when appropriate. Ability to conduct physical exam was limited. I was in the office. The patient was at home.        JOAQUIN High

## 2021-01-22 ENCOUNTER — PATIENT OUTREACH (OUTPATIENT)
Dept: CASE MANAGEMENT | Age: 66
End: 2021-01-22

## 2021-01-22 ENCOUNTER — TELEPHONE (OUTPATIENT)
Dept: INTERNAL MEDICINE CLINIC | Age: 66
End: 2021-01-22

## 2021-01-22 NOTE — TELEPHONE ENCOUNTER
Call to patient, identified with 2 identifiers. Advised of Dr. Jada Pino' recommendation, do not use Primatene mist inhaler. Patient verbalized understanding. She saw Denver Gonzalez NP yesterday.

## 2021-01-28 LAB
ALBUMIN SERPL-MCNC: 4.1 G/DL (ref 3.8–4.8)
ALBUMIN/GLOB SERPL: 1.3 {RATIO} (ref 1.2–2.2)
ALP SERPL-CCNC: 115 IU/L (ref 39–117)
ALT SERPL-CCNC: 22 IU/L (ref 0–32)
AST SERPL-CCNC: 23 IU/L (ref 0–40)
BASOPHILS # BLD AUTO: 0.1 X10E3/UL (ref 0–0.2)
BASOPHILS NFR BLD AUTO: 1 %
BILIRUB SERPL-MCNC: 0.3 MG/DL (ref 0–1.2)
BUN SERPL-MCNC: 12 MG/DL (ref 8–27)
BUN/CREAT SERPL: 21 (ref 12–28)
CALCIUM SERPL-MCNC: 9.3 MG/DL (ref 8.7–10.3)
CHLORIDE SERPL-SCNC: 102 MMOL/L (ref 96–106)
CO2 SERPL-SCNC: 25 MMOL/L (ref 20–29)
CREAT SERPL-MCNC: 0.58 MG/DL (ref 0.57–1)
EOSINOPHIL # BLD AUTO: 0.2 X10E3/UL (ref 0–0.4)
EOSINOPHIL NFR BLD AUTO: 3 %
ERYTHROCYTE [DISTWIDTH] IN BLOOD BY AUTOMATED COUNT: 12.9 % (ref 11.7–15.4)
GLOBULIN SER CALC-MCNC: 3.2 G/DL (ref 1.5–4.5)
GLUCOSE SERPL-MCNC: 92 MG/DL (ref 65–99)
HCT VFR BLD AUTO: 37.3 % (ref 34–46.6)
HGB BLD-MCNC: 13 G/DL (ref 11.1–15.9)
IMM GRANULOCYTES # BLD AUTO: 0 X10E3/UL (ref 0–0.1)
IMM GRANULOCYTES NFR BLD AUTO: 0 %
LYMPHOCYTES # BLD AUTO: 2.1 X10E3/UL (ref 0.7–3.1)
LYMPHOCYTES NFR BLD AUTO: 30 %
MCH RBC QN AUTO: 32.4 PG (ref 26.6–33)
MCHC RBC AUTO-ENTMCNC: 34.9 G/DL (ref 31.5–35.7)
MCV RBC AUTO: 93 FL (ref 79–97)
MONOCYTES # BLD AUTO: 0.6 X10E3/UL (ref 0.1–0.9)
MONOCYTES NFR BLD AUTO: 8 %
NEUTROPHILS # BLD AUTO: 4 X10E3/UL (ref 1.4–7)
NEUTROPHILS NFR BLD AUTO: 58 %
PLATELET # BLD AUTO: 343 X10E3/UL (ref 150–450)
POTASSIUM SERPL-SCNC: 3.9 MMOL/L (ref 3.5–5.2)
PROT SERPL-MCNC: 7.3 G/DL (ref 6–8.5)
RBC # BLD AUTO: 4.01 X10E6/UL (ref 3.77–5.28)
SODIUM SERPL-SCNC: 140 MMOL/L (ref 134–144)
T4 FREE SERPL-MCNC: 1.5 NG/DL (ref 0.82–1.77)
TSH SERPL DL<=0.005 MIU/L-ACNC: 3.49 UIU/ML (ref 0.45–4.5)
WBC # BLD AUTO: 7 X10E3/UL (ref 3.4–10.8)

## 2021-01-29 NOTE — PROGRESS NOTES
CBC, CMP, TSH, free T4 all normal. Noted that thyroid labs were performed early-discussed with Dr. Jada Pino and he will repeat labs at her follow up with him in March.

## 2021-02-11 RX ORDER — LEVOTHYROXINE SODIUM 137 UG/1
137 TABLET ORAL
Qty: 30 TAB | Refills: 0 | Status: SHIPPED | OUTPATIENT
Start: 2021-02-11 | End: 2021-03-10

## 2021-02-11 NOTE — TELEPHONE ENCOUNTER
Candy Smith ([facility]) [phone] (H)     [facility]ill [facility]roxine to [facility]. Pt only has enough to get there through tomorrow.

## 2021-02-12 ENCOUNTER — NURSE TRIAGE (OUTPATIENT)
Dept: OTHER | Facility: CLINIC | Age: 66
End: 2021-02-12

## 2021-02-12 NOTE — TELEPHONE ENCOUNTER
Future Appointments   Date Time Provider Farhana Cunha   3/5/2021  9:00 AM Yuri Hanley MD [facility]   9/10/2021  8:30 AM Yuri Hanley MD [facility]

## 2021-02-12 NOTE — TELEPHONE ENCOUNTER
Reason for Disposition   COVID-19 vaccine, Frequently Asked Questions (FAQs)    Answer Assessment - Initial Assessment Questions  1. MAIN CONCERN OR SYMPTOM:  \"What is your main concern right now? \" \"What question do you have? \" \"What's the main symptom you're worried about? \" (e.g., fever, pain, redness, swelling)      Patient asking if she is able to receive the Covid-19 vaccine since she was diagnosed with Covid in December 2020. 2. VACCINE: \"What vaccination did you receive? \" \"Is this your first or second shot? \" (e.g., none; [place], other)      NA    3. SYMPTOM ONSET: \"When did the *No Answer* begin? \" (e.g., not relevant; hours, days)       NA    4. SYMPTOM SEVERITY: \"How bad is it? \"      NA    5. FEVER: \"Is there a fever? \" If so, ask: \"What is it, how was it measured, and when did it start? \"      NA    6. PAST REACTIONS: \"Have you reacted to immunizations before? \" If so, ask: \"What happened? \"     NA    7. OTHER SYMPTOMS: \"Do you have any other symptoms? \"      NA    Protocols used: CORONAVIRUS (COVID-19) VACCINE QUESTIONS AND REACTIONS-ADULT-AH    Call received on [address]. Brief description of triage:    Patient asking if she is able to receive the Covid-19 vaccine since she was diagnosed with Covid in December 2020. Provided Care Advice stating, [place] CDC states that the vaccines should be offered to those with a history of Covid-19 infection. \"  Since patient is no longer sick with COVID and is out of isolation, she was advised it is okay for her to receive the vaccine; pt vu.    Care advice provided. Recommended using local department of health website for testing locations and up to date information. Caller verbalizes understanding, denies any other questions or concerns; instructed to call back for any new or worsening symptoms.

## 2021-02-25 ENCOUNTER — NURSE TRIAGE (OUTPATIENT)
Dept: OTHER | Facility: CLINIC | Age: 66
End: 2021-02-25

## 2021-02-25 NOTE — TELEPHONE ENCOUNTER
Reason for Disposition   COVID-19 vaccine, Frequently Asked Questions (FAQs)    Answer Assessment - Initial Assessment Questions  1. MAIN CONCERN OR SYMPTOM:  \"What is your main concern right now? \" \"What question do you have? \" \"What's the main symptom you're worried about? \" (e.g., fever, pain, redness, swelling)      Wants to know if she can get the COVID vaccine. She had COVID 2 months ago. 2. VACCINE: \"What vaccination did you receive? \" \"Is this your first or second shot? \" (e.g., none; [place], other)      N/A    3. SYMPTOM ONSET: \"When did the N/A begin? \" (e.g., not relevant; hours, days)       N/A    4. SYMPTOM SEVERITY: \"How bad is it? \"       N/A    5. FEVER: \"Is there a fever? \" If so, ask: \"What is it, how was it measured, and when did it start? \"       N/A    6. PAST REACTIONS: \"Have you reacted to immunizations before? \" If so, ask: \"What happened? \"      N/A    7. OTHER SYMPTOMS: \"Do you have any other symptoms? \"      N/A    Protocols used: CORONAVIRUS (COVID-19) VACCINE QUESTIONS AND REACTIONS-ADULT-AH    Call received on [address]. No triage was needed. Care advice provided. Recommended using local department of health website for testing locations and up to date information. Caller verbalizes understanding, denies any other questions or concerns; instructed to call back for any new or worsening symptoms. Caller requested to make appointment for vaccine, so was transferred to vaccine hotline.

## 2021-03-05 ENCOUNTER — OFFICE VISIT (OUTPATIENT)
Dept: INTERNAL MEDICINE CLINIC | Age: 66
End: 2021-03-05
Payer: MEDICARE

## 2021-03-05 VITALS
DIASTOLIC BLOOD PRESSURE: 84 MMHG | RESPIRATION RATE: 18 BRPM | TEMPERATURE: 97.6 F | HEART RATE: 78 BPM | SYSTOLIC BLOOD PRESSURE: 158 MMHG | HEIGHT: 62 IN | OXYGEN SATURATION: 98 % | WEIGHT: 157 LBS | BODY MASS INDEX: 28.89 KG/M2

## 2021-03-05 DIAGNOSIS — E03.4 HYPOTHYROIDISM DUE TO ACQUIRED ATROPHY OF THYROID: ICD-10-CM

## 2021-03-05 DIAGNOSIS — I10 HYPERTENSION, ESSENTIAL: ICD-10-CM

## 2021-03-05 DIAGNOSIS — I47.1 ATRIAL TACHYCARDIA (HCC): ICD-10-CM

## 2021-03-05 DIAGNOSIS — Z00.00 MEDICARE ANNUAL WELLNESS VISIT, SUBSEQUENT: Primary | ICD-10-CM

## 2021-03-05 LAB — TSH SERPL DL<=0.05 MIU/L-ACNC: 0.57 UIU/ML (ref 0.36–3.74)

## 2021-03-05 PROCEDURE — G8399 PT W/DXA RESULTS DOCUMENT: HCPCS | Performed by: INTERNAL MEDICINE

## 2021-03-05 PROCEDURE — G0439 PPPS, SUBSEQ VISIT: HCPCS | Performed by: INTERNAL MEDICINE

## 2021-03-05 PROCEDURE — G8753 SYS BP > OR = 140: HCPCS | Performed by: INTERNAL MEDICINE

## 2021-03-05 PROCEDURE — 3017F COLORECTAL CA SCREEN DOC REV: CPT | Performed by: INTERNAL MEDICINE

## 2021-03-05 PROCEDURE — G8419 CALC BMI OUT NRM PARAM NOF/U: HCPCS | Performed by: INTERNAL MEDICINE

## 2021-03-05 PROCEDURE — G8510 SCR DEP NEG, NO PLAN REQD: HCPCS | Performed by: INTERNAL MEDICINE

## 2021-03-05 PROCEDURE — G9899 SCRN MAM PERF RSLTS DOC: HCPCS | Performed by: INTERNAL MEDICINE

## 2021-03-05 PROCEDURE — 1101F PT FALLS ASSESS-DOCD LE1/YR: CPT | Performed by: INTERNAL MEDICINE

## 2021-03-05 PROCEDURE — G8536 NO DOC ELDER MAL SCRN: HCPCS | Performed by: INTERNAL MEDICINE

## 2021-03-05 PROCEDURE — G8754 DIAS BP LESS 90: HCPCS | Performed by: INTERNAL MEDICINE

## 2021-03-05 PROCEDURE — G8427 DOCREV CUR MEDS BY ELIG CLIN: HCPCS | Performed by: INTERNAL MEDICINE

## 2021-03-05 RX ORDER — ZOSTER VACCINE RECOMBINANT, ADJUVANTED 50 MCG/0.5
KIT INTRAMUSCULAR
Qty: 0.5 ML | Refills: 1 | Status: CANCELLED | OUTPATIENT
Start: 2021-03-05

## 2021-03-05 RX ORDER — METOPROLOL TARTRATE 50 MG/1
TABLET ORAL
Qty: 180 TAB | Refills: 1 | Status: SHIPPED | OUTPATIENT
Start: 2021-03-05 | End: 2021-11-29 | Stop reason: SDUPTHER

## 2021-03-05 RX ORDER — LOSARTAN POTASSIUM 25 MG/1
25 TABLET ORAL DAILY
Qty: 90 TAB | Refills: 0 | Status: SHIPPED | OUTPATIENT
Start: 2021-03-05 | End: 2021-06-16

## 2021-03-05 NOTE — ACP (ADVANCE CARE PLANNING)
Advance Care Planning     Advance Care Planning (ACP) Physician/NP/PA (Provider) Conversation    Date of ACP Conversation: 03/05/21  Persons included in Conversation:  patient  Length of ACP Conversation in minutes: <16 minutes (Non-Billable)    Authorized Decision Maker (if patient is incapable of making informed decisions):   Next of Kin by law (only applies in absence of a Healthcare Power of  or Legal Guardian)      Primary Decision Maker: Yuri Diaz - Carlos Alberto - [phone]    She has NO advanced directive - recommended completing forms. Reviewed DNR/DNI and patient is not interested.          Becky Mendoza MD

## 2021-03-05 NOTE — PATIENT INSTRUCTIONS
Medicare Wellness Visit, Female     The best way to live healthy is to have a lifestyle where you eat a well-balanced diet, exercise regularly, limit alcohol use, and quit all forms of tobacco/nicotine, if applicable. Regular preventive services are another way to keep healthy. Preventive services (vaccines, screening tests, monitoring & exams) can help personalize your care plan, which helps you manage your own care. Screening tests can find health problems at the earliest stages, when they are easiest to treat. [facility] follows the current, evidence-based guidelines published by the [facility] ([facility]) when recommending preventive services for our patients. Because we follow these guidelines, sometimes recommendations change over time as research supports it. (For example, mammograms used to be recommended annually. Even though Medicare will still pay for an annual mammogram, the newer guidelines recommend a mammogram every two years for women of average risk). Of course, you and your doctor may decide to screen more often for some diseases, based on your risk and your co-morbidities (chronic disease you are already diagnosed with). Preventive services for you include:  - Medicare offers their members a free annual wellness visit, which is time for you and your primary care provider to discuss and plan for your preventive service needs. Take advantage of this benefit every year!  -All adults over the age of 72 should receive the recommended pneumonia vaccines. Current USPSTF guidelines recommend a series of two vaccines for the best pneumonia protection.   -All adults should have a flu vaccine yearly and a tetanus vaccine every 10 years.   -All adults age 48 and older should receive the shingles vaccines (series of two vaccines).       -All adults age 38-68 who are overweight should have a diabetes screening test once every three years.   -All adults born between [birth year] and 1965 should be screened once for Hepatitis C.  -Other screening tests and preventive services for persons with diabetes include: an eye exam to screen for diabetic retinopathy, a kidney function test, a foot exam, and stricter control over your cholesterol.   -Cardiovascular screening for adults with routine risk involves an electrocardiogram (ECG) at intervals determined by your doctor.   -Colorectal cancer screenings should be done for adults age 54-65 with no increased risk factors for colorectal cancer. There are a number of acceptable methods of screening for this type of cancer. Each test has its own benefits and drawbacks. Discuss with your doctor what is most appropriate for you during your annual wellness visit. The different tests include: colonoscopy (considered the best screening method), a fecal occult blood test, a fecal DNA test, and sigmoidoscopy.    -A bone mass density test is recommended when a woman turns 65 to screen for osteoporosis. This test is only recommended one time, as a screening. Some providers will use this same test as a disease monitoring tool if you already have osteoporosis. -Breast cancer screenings are recommended every other year for women of normal risk, age 54-69.  -Cervical cancer screenings for women over age 72 are only recommended with certain risk factors. Here is a list of your current Health Maintenance items (your personalized list of preventive services) with a due date:  Health Maintenance Due   Topic Date Due    COVID-19 Vaccine (1 of 2) Never done    Shingles Vaccine (1 of 2) Never done    Glaucoma Screening   Never done    DTaP/Tdap/Td  (2 - Td) 10/07/2020              Learning About [facility]  What is a living will? A living will is a legal form you use to write down the kind of care you want at the end of your life. It is used by the health professionals who will treat you if you aren't able to decide for yourself.   If you put your wishes in writing, your loved ones and others will know what kind of care you want. They won't need to guess. This can ease your mind and be helpful to others. A living will is not the same as an estate or property will. An estate will explains what you want to happen with your money and property after you die. Is a living will a legal document? A living will is a legal document. Each state has its own laws about living menon. If you move to another state, make sure that your living will is legal in the state where you now live. Or you might use a universal form that has been approved by many states. This kind of form can sometimes be completed and stored online. Your electronic copy will then be available wherever you have a connection to the Internet. In most cases, doctors will respect your wishes even if you have a form from a different state. · You don't need an  to complete a living will. But legal advice can be helpful if your state's laws are unclear, your health history is complicated, or your family can't agree on what should be in your living will. · You can change your living will at any time. Some people find that their wishes about end-of-life care change as their health changes. · In addition to making a living will, think about completing a medical power of  form. This form lets you name the person you want to make end-of-life treatment decisions for you (your \"health care agent\") if you're not able to. Many hospitals and nursing homes will give you the forms you need to complete a living will and a medical power of . · Your living will is used only if you can't make or communicate decisions for yourself anymore. If you become able to make decisions again, you can accept or refuse any treatment, no matter what you wrote in your living will. · Your state may offer an online registry.  This is a place where you can store your living will online so the doctors and nurses who need to treat you can find it right away. What should you think about when creating a living will? Talk about your end-of-life wishes with your family members and your doctor. Let them know what you want. That way the people making decisions for you won't be surprised by your choices. Think about these questions as you make your living will:  · Do you know enough about life support methods that might be used? If not, talk to your doctor so you know what might be done if you can't breathe on your own, your heart stops, or you're unable to swallow. · What things would you still want to be able to do after you receive life-support methods? Would you want to be able to walk? To speak? To eat on your own? To live without the help of machines? · If you have a choice, where do you want to be cared for? In your home? At a hospital or nursing home? · Do you want certain Moravian practices performed if you become very ill? · If you have a choice at the end of your life, where would you prefer to die? At home? In a hospital or nursing home? Somewhere else? · Would you prefer to be buried or cremated? · Do you want your organs to be donated after you die? What should you do with your living will? · Make sure that your family members and your health care agent have copies of your living will. · Give your doctor a copy of your living will to keep in your medical record. If you have more than one doctor, make sure that each one has a copy. · You may want to put a copy of your living will where it can be easily found. Where can you learn more? Go to http://www.gray.com/. Enter C404 in the search box to learn more about \"Learning About Living [place]. \"  Current as of: August 8, 2016  Content Version: 11.3  © 0562-7482 [employer]. Care instructions adapted under license by [employer] (which disclaims liability or warranty for this information).  If you have questions about a medical condition or this instruction, always ask your healthcare professional. [place] [address] any warranty or liability for your use of this information.

## 2021-03-05 NOTE — PROGRESS NOTES
Nora Goodrich is a 72 y.o. female who was seen in clinic today (3/5/2021) for a full physical.             Assessment & Plan:     Diagnoses and all orders for this visit:    1. Medicare annual wellness visit, subsequent  -     REFERRAL TO OPHTHALMOLOGY    2. Hypertension, essential- uncontrolled, will add in ARB (had cough w/ acei). -     metoprolol tartrate (LOPRESSOR) 50 mg tablet; TAKE ONE TABLET BY MOUTH TWICE A DAY  -     losartan (COZAAR) 25 mg tablet; Take 1 Tab by mouth daily. 3. Atrial tachycardia ([address]- asymptomatic, resolved, continue BB.  -     metoprolol tartrate (LOPRESSOR) 50 mg tablet; TAKE ONE TABLET BY MOUTH TWICE A DAY    4. Hypothyroidism due to acquired atrophy of thyroid- she is concerned she is on to much medication, dose recently adjusted, will repeat labs. -     TSH 3RD GENERATION; Future      Follow-up and Dispositions    · Return in about 6 weeks (around 4/16/2021), or if symptoms worsen or fail to improve. Subjective:   Alis Jackson is here today for a full physical.      Annual Wellness Visit- Subsequent Visit    Since last visit: admitted in Jan for [facility]. Had f/u with Matteo Stone on 1/21. Started on Trazodone. The following acute and/or chronic problems were addressed today:    Endocrine Review  Patient is seen for followup of hypothyroidism. She feels her dose is to much because she has lost some weight. She wants to reduce her dose. She reports medication compliance: all the time and is taking separate from all other meds. She reports the following concerns/problems/med side effects: none. Lab review: labs reviewed and discussed with patient. Mental Health Review  Patient is seen for insomnia. Ongoing sleep issues include: trouble staying asleep. She denies: trouble falling asleep. Reports experiences the following side effects from the treatment: felt weak in the morning on 1 tab. Doing better on 1/2 tab.     Cardiovascular Review  The patient has hypertension & hyperlipidemia. No further heart racing since admission. She reports taking medications as instructed, no medication side effects noted, home BP monitoring in range of 442'J systolic over 91'S diastolic. Diet and Lifestyle: generally follows a low sodium diet, sedentary. Labs: reviewed and discussed with patient. End of Life Planning: This was discussed with her today and she has NO advanced directive  - add't info provided. Reviewed DNR/DNI and patient is not interested. She has a lot of questions about her relatives and their experiences. Depression Screen:  3 most recent PHQ Screens 3/5/2021   Little interest or pleasure in doing things Not at all   Feeling down, depressed, irritable, or hopeless Not at all   Total Score PHQ 2 0   Trouble falling or staying asleep, or sleeping too much -   Feeling tired or having little energy -   Poor appetite, weight loss, or overeating -   Feeling bad about yourself - or that you are a failure or have let yourself or your family down -   Trouble concentrating on things such as school, work, reading, or watching TV -   Moving or speaking so slowly that other people could have noticed; or the opposite being so fidgety that others notice -   How difficult have these problems made it for you to do your work, take care of your home and get along with others -         Fall Risk:   Fall Risk Assessment, last 12 mths 3/5/2021   Able to walk? Yes   Fall in past 12 months? 0   Do you feel unsteady? 0   Are you worried about falling 0       Abuse Screen:  Abuse Screening Questionnaire 3/5/2021   Do you ever feel afraid of your partner? N   Are you in a relationship with someone who physically or mentally threatens you? N   Is it safe for you to go home?  Y         Do you average more than 1 drink per night or more than 7 drinks a week:  No    On any one occasion in the past three months have you have had more than 3 drinks containing alcohol:  No    Health Maintenance: Daily Aspirin: yes  Bone Density: done 9/17/20 - osteopenia  Glaucoma Screening: referal placed    Immunizations:   Influenza: up to date  Tetanus: not up to date - declined due to cost  Shingles: not up to date - will defer to COVID and cost  Pneumonia: up to date  Cancer screening:   Cervical: reviewed guidelines, up to date  Breast: reviewed guidelines, up to date  Colon: reviewed guidelines, up to date       Functional Ability and Level of Safety:    Hearing: Hearing is good. Cognition Screen:   Has your family/caregiver stated any concerns about your memory: no     Ambulation: with no difficulty     Activities of Daily Living: The home contains: no safety equipment. Patient does total self care  Exercise: moderately active - mostly at work    Adult Nutrition Screen:  No risk factors noted. Patient Care Team:  Dalton Howe MD as PCP - General  Dalton Howe MD as PCP [facility]       The following sections were reviewed & updated as appropriate: Problem List, Allergies, PMH, PSH, FH, and SH. Prior to Admission medications    Medication Sig Start Date End Date Taking? Authorizing Provider   levothyroxine (SYNTHROID) 137 mcg tablet Take 137 mcg by mouth Daily (before breakfast). 2/11/21  Yes Dalton Howe MD   traZODone (DESYREL) 50 mg tablet Take 1 Tab by mouth nightly. 1/21/21  Yes JOAQUIN Menezes   calcium carbonate/vitamin D3 (CALCIUM 600 + D,3, PO) Take  by mouth every other day. Yes Provider, Historical   diclofenac (Voltaren) 1 % gel Apply 2 g to affected area daily. R knee   Yes Provider, Historical   trolamine salicylate (ASPERCREME EX) by Apply Externally route every other day. Yes Provider, Historical   metoprolol tartrate (LOPRESSOR) 50 mg tablet TAKE ONE TABLET BY MOUTH TWICE A DAY 4/24/20  Yes Dalton Howe MD   amLODIPine (NORVASC) 5 mg tablet Take 5 mg by mouth daily.    Yes Provider, Historical   cyanocobalamin (VITAMIN B-12) 1,000 mcg tablet Take 1,000 mcg by mouth every other day. Yes Provider, Historical   acetaminophen (TYLENOL) 325 mg tablet Take  by mouth every four (4) hours as needed for Pain. Yes Provider, Historical   aspirin 81 mg tablet Take 1 Tab by mouth daily. 11/18/11  Yes Dilma Whitehead MD   multivitamin (ONE A DAY) tablet Take 1 Tab by mouth daily. 5/31/11  Yes Provider, Historical          Review of Systems   Constitutional: Negative for malaise/fatigue and weight loss. Respiratory: Negative for cough and shortness of breath. Cardiovascular: Negative for chest pain, palpitations and leg swelling. Gastrointestinal: Negative for abdominal pain, constipation, diarrhea, heartburn, nausea and vomiting. Musculoskeletal: Negative for joint pain and myalgias. Skin: Negative for rash. Neurological: Negative for dizziness and headaches. Psychiatric/Behavioral: Negative for depression. The patient is not nervous/anxious and does not have insomnia. Objective:   Physical Exam  Neck:      Thyroid: No thyromegaly or thyroid tenderness. Cardiovascular:      Rate and Rhythm: Regular rhythm. Heart sounds: Normal heart sounds. No murmur. Pulmonary:      Effort: Pulmonary effort is normal.      Breath sounds: Normal breath sounds. No wheezing or rales. Abdominal:      General: Bowel sounds are normal.      Palpations: There is no mass. Tenderness: There is no abdominal tenderness.             Visit Vitals  BP (!) 158/84   Pulse 78   Temp 97.6 °F (36.4 °C) (Temporal)   Resp 18   Ht 5' 2.4\" (1.585 m)   Wt 157 lb (71.2 kg)   LMP 01/01/2003   SpO2 98%   BMI 28.35 kg/m²          Eden Pratt MD

## 2021-03-08 NOTE — PROGRESS NOTES
Results released to patient via [employer]. TSH improved. It is low. She feels like she is on to much medication. Will hold dose on Sunday.

## 2021-03-10 ENCOUNTER — TELEPHONE (OUTPATIENT)
Dept: INTERNAL MEDICINE CLINIC | Age: 66
End: 2021-03-10

## 2021-03-10 RX ORDER — LEVOTHYROXINE SODIUM 137 UG/1
TABLET ORAL
Qty: 90 TAB | Refills: 0 | Status: SHIPPED | OUTPATIENT
Start: 2021-03-10 | End: 2021-06-16

## 2021-03-10 NOTE — TELEPHONE ENCOUNTER
Alisa Seat (Self) [phone] (H)     Pt wants to know if dr Celia De La Cruz is going to change the strength of her thyroid meds? She says that she saw dr Celia De La Cruz on the 5th.

## 2021-03-11 NOTE — TELEPHONE ENCOUNTER
Results released via [employer]. She has not read:    Kristi, your TSH (measurement of your thyroid function) is improved.  It is normal but it is low.  Since you are not feeling good I would like to decrease your dose of medication.  Lets skip the dose on Sunday.  Continue to take 1 tab on Monday thru Saturday.

## 2021-03-11 NOTE — TELEPHONE ENCOUNTER
Spoke with patient and let her know about the dose change. Patient understood and will skip the Sunday dose.

## 2021-04-23 ENCOUNTER — TELEPHONE (OUTPATIENT)
Dept: INTERNAL MEDICINE CLINIC | Age: 66
End: 2021-04-23

## 2021-04-23 NOTE — TELEPHONE ENCOUNTER
Spoke with Timothy Urrutia with [facility] and gave her Dx codes per Dr Isabella Cordova reviewing Bessie Rowan note in Jan 2021.

## 2021-04-23 NOTE — TELEPHONE ENCOUNTER
[place] from [address] [place] [address]     Calling to obtain diagnosis codes for labwork ordered on 1/21.     Ref# [id number]

## 2021-04-23 NOTE — TELEPHONE ENCOUNTER
Per Ekaterina's note in January. 1. Hypothyroidism due to acquired atrophy of thyroid  -     TSH 3RD GENERATION; Future  -     T4, FREE; Future     2. Hypertension, essential  -     METABOLIC PANEL, COMPREHENSIVE; Future  -     CBC WITH AUTOMATED DIFF; Future     3. Hyponatremia  -     METABOLIC PANEL, COMPREHENSIVE;  Future

## 2021-06-16 DIAGNOSIS — I10 HYPERTENSION, ESSENTIAL: ICD-10-CM

## 2021-06-16 RX ORDER — LEVOTHYROXINE SODIUM 137 UG/1
TABLET ORAL
Qty: 90 TABLET | Refills: 0 | Status: SHIPPED | OUTPATIENT
Start: 2021-06-16 | End: 2021-09-10

## 2021-06-16 RX ORDER — LOSARTAN POTASSIUM 25 MG/1
TABLET ORAL
Qty: 90 TABLET | Refills: 0 | Status: SHIPPED | OUTPATIENT
Start: 2021-06-16 | End: 2021-09-10 | Stop reason: SDUPTHER

## 2021-09-05 DIAGNOSIS — I10 HYPERTENSION, ESSENTIAL: ICD-10-CM

## 2021-09-10 ENCOUNTER — OFFICE VISIT (OUTPATIENT)
Dept: INTERNAL MEDICINE CLINIC | Age: 66
End: 2021-09-10
Payer: MEDICARE

## 2021-09-10 ENCOUNTER — TELEPHONE (OUTPATIENT)
Dept: INTERNAL MEDICINE CLINIC | Age: 66
End: 2021-09-10

## 2021-09-10 VITALS
HEIGHT: 63 IN | RESPIRATION RATE: 16 BRPM | SYSTOLIC BLOOD PRESSURE: 150 MMHG | HEART RATE: 74 BPM | BODY MASS INDEX: 29.06 KG/M2 | OXYGEN SATURATION: 100 % | DIASTOLIC BLOOD PRESSURE: 86 MMHG | TEMPERATURE: 98.4 F | WEIGHT: 164 LBS

## 2021-09-10 DIAGNOSIS — I10 HYPERTENSION, ESSENTIAL: ICD-10-CM

## 2021-09-10 DIAGNOSIS — I10 HYPERTENSION, ESSENTIAL: Primary | ICD-10-CM

## 2021-09-10 DIAGNOSIS — E03.4 HYPOTHYROIDISM DUE TO ACQUIRED ATROPHY OF THYROID: ICD-10-CM

## 2021-09-10 DIAGNOSIS — N30.00 ACUTE CYSTITIS WITHOUT HEMATURIA: ICD-10-CM

## 2021-09-10 PROBLEM — R77.8 ELEVATED TROPONIN LEVEL: Status: RESOLVED | Noted: 2021-01-21 | Resolved: 2021-09-10

## 2021-09-10 PROBLEM — R77.8 ELEVATED TROPONIN: Status: RESOLVED | Noted: 2021-01-12 | Resolved: 2021-09-10

## 2021-09-10 PROCEDURE — 1101F PT FALLS ASSESS-DOCD LE1/YR: CPT | Performed by: INTERNAL MEDICINE

## 2021-09-10 PROCEDURE — G8427 DOCREV CUR MEDS BY ELIG CLIN: HCPCS | Performed by: INTERNAL MEDICINE

## 2021-09-10 PROCEDURE — G8536 NO DOC ELDER MAL SCRN: HCPCS | Performed by: INTERNAL MEDICINE

## 2021-09-10 PROCEDURE — 1090F PRES/ABSN URINE INCON ASSESS: CPT | Performed by: INTERNAL MEDICINE

## 2021-09-10 PROCEDURE — G8753 SYS BP > OR = 140: HCPCS | Performed by: INTERNAL MEDICINE

## 2021-09-10 PROCEDURE — G8419 CALC BMI OUT NRM PARAM NOF/U: HCPCS | Performed by: INTERNAL MEDICINE

## 2021-09-10 PROCEDURE — G9899 SCRN MAM PERF RSLTS DOC: HCPCS | Performed by: INTERNAL MEDICINE

## 2021-09-10 PROCEDURE — 99214 OFFICE O/P EST MOD 30 MIN: CPT | Performed by: INTERNAL MEDICINE

## 2021-09-10 PROCEDURE — 3017F COLORECTAL CA SCREEN DOC REV: CPT | Performed by: INTERNAL MEDICINE

## 2021-09-10 PROCEDURE — G8510 SCR DEP NEG, NO PLAN REQD: HCPCS | Performed by: INTERNAL MEDICINE

## 2021-09-10 PROCEDURE — G8399 PT W/DXA RESULTS DOCUMENT: HCPCS | Performed by: INTERNAL MEDICINE

## 2021-09-10 PROCEDURE — G0463 HOSPITAL OUTPT CLINIC VISIT: HCPCS | Performed by: INTERNAL MEDICINE

## 2021-09-10 PROCEDURE — G8754 DIAS BP LESS 90: HCPCS | Performed by: INTERNAL MEDICINE

## 2021-09-10 RX ORDER — PHENAZOPYRIDINE HYDROCHLORIDE 95 MG/1
TABLET ORAL
COMMUNITY
End: 2021-10-06

## 2021-09-10 RX ORDER — LOSARTAN POTASSIUM 50 MG/1
25 TABLET ORAL DAILY
Qty: 90 TABLET | Refills: 1 | Status: SHIPPED | OUTPATIENT
Start: 2021-09-10 | End: 2021-09-10 | Stop reason: SDUPTHER

## 2021-09-10 RX ORDER — LEVOTHYROXINE SODIUM 137 UG/1
TABLET ORAL
Qty: 90 TABLET | Refills: 0 | Status: SHIPPED | OUTPATIENT
Start: 2021-09-10 | End: 2021-11-29 | Stop reason: SDUPTHER

## 2021-09-10 RX ORDER — LOSARTAN POTASSIUM 25 MG/1
TABLET ORAL
Qty: 90 TABLET | Refills: 0 | OUTPATIENT
Start: 2021-09-10

## 2021-09-10 RX ORDER — NITROFURANTOIN 25; 75 MG/1; MG/1
100 CAPSULE ORAL 2 TIMES DAILY
Qty: 10 CAPSULE | Refills: 0 | Status: SHIPPED | OUTPATIENT
Start: 2021-09-10 | End: 2021-09-15

## 2021-09-10 RX ORDER — LORATADINE 10 MG/1
10 TABLET ORAL DAILY
Qty: 90 TABLET | Refills: 1 | Status: SHIPPED | OUTPATIENT
Start: 2021-09-10

## 2021-09-10 RX ORDER — LOSARTAN POTASSIUM 50 MG/1
50 TABLET ORAL DAILY
Qty: 90 TABLET | Refills: 1 | Status: SHIPPED
Start: 2021-09-10 | End: 2021-11-29 | Stop reason: ALTCHOICE

## 2021-09-10 RX ORDER — SULFAMETHOXAZOLE AND TRIMETHOPRIM 800; 160 MG/1; MG/1
TABLET ORAL
COMMUNITY
Start: 2021-09-09 | End: 2021-09-10

## 2021-09-10 NOTE — PROGRESS NOTES
Chanel Palma is a 77 y.o. female who was seen in clinic today (9/10/2021). Assessment & Plan:   Below is the assessment and plan developed based on review of pertinent history, physical exam, labs, studies, and medications. 1. Hypertension, essential  Assessment & Plan:  Not ideally controlled, will increase losartan dose, reviewed amb BP should be in the 120-130's. Orders:  -     losartan (COZAAR) 50 mg tablet; Take 0.5 Tablets by mouth daily. Take 1 tablet by mouth daily, Normal, Disp-90 Tablet, R-1  2. Hypothyroidism due to acquired atrophy of thyroid  Assessment & Plan:  well controlled, continue current treatment pending review of labs due to fluctuating TSH this year   Orders:  -     TSH 3RD GENERATION; Future  3. Acute cystitis without hematuria  Comments:  will stop Bactrim. Will send in Macrobid  Orders:  -     nitrofurantoin, macrocrystal-monohydrate, (MACROBID) 100 mg capsule; Take 1 Capsule by mouth two (2) times a day for 5 days. , Normal, Disp-10 Capsule, R-0    Follow-up and Dispositions    · Return in about 6 weeks (around 10/22/2021) for Nurse visit for blood pressure check, then a FULL PHYSICAL in March. Subjective/Objective:   Kristi was seen today for ED Follow-up (urgent care f/u for UTI - started on abx and using azo - concerned about abx and interaction w/ her BP med) and Knee Problem      Since last visit: seen at [facility] yesterday for UTI symptoms. Diagnosed with UTI. Given Bactrim and Azo. She took Bactrim x 2 doses but stopped but to pharmacist telling her about BP interaction (ARB). Cardiovascular Review  The patient has hypertension. She reports taking medications as instructed, no medication side effects noted, home BP monitoring in range of 502'Z systolic over 62'U diastolic. She generally follows a low sodium diet. Endocrine Review  Patient is seen for followup of hypothyroidism.   She reports medication compliance: all the time and is taking separate from all other meds. She reports the following concerns/problems/med side effects: none. Prior to Admission medications    Medication Sig Start Date End Date Taking? Authorizing Provider   trimethoprim-sulfamethoxazole (BACTRIM DS, SEPTRA DS) 160-800 mg per tablet  9/9/21  Yes Provider, Historical   phenazopyridine (PYRIDIUM) 95 mg tab Take  by mouth. Yes Provider, Historical   levothyroxine (SYNTHROID) 137 mcg tablet TAKE ONE TABLET BY MOUTH DAILY EXCEPT SKIP DOSE ON SUNDAYS 6/16/21  Yes Gab DICKSON NP   losartan (COZAAR) 25 mg tablet TAKE ONE TABLET BY MOUTH DAILY 6/16/21  Yes Gab DICKSON NP   metoprolol tartrate (LOPRESSOR) 50 mg tablet TAKE ONE TABLET BY MOUTH TWICE A DAY 3/5/21  Yes Kristen Pitts MD   calcium carbonate/vitamin D3 (CALCIUM 600 + D,3, PO) Take  by mouth every other day. Yes Provider, Historical   diclofenac (Voltaren) 1 % gel Apply 2 g to affected area daily. R knee   Yes Provider, Historical   amLODIPine (NORVASC) 5 mg tablet Take 5 mg by mouth daily. Yes Provider, Historical   cyanocobalamin (VITAMIN B-12) 1,000 mcg tablet Take 1,000 mcg by mouth every other day. Yes Provider, Historical   acetaminophen (TYLENOL) 325 mg tablet Take  by mouth every four (4) hours as needed for Pain. Yes Provider, Historical   aspirin 81 mg tablet Take 1 Tab by mouth daily. 11/18/11  Yes Kristen Pitts MD   multivitamin (ONE A DAY) tablet Take 1 Tab by mouth daily. 5/31/11  Yes Provider, Historical   traZODone (DESYREL) 50 mg tablet Take 1 Tab by mouth nightly. Patient not taking: Reported on 9/10/2021 1/21/21   JOAQUIN Amanda   trolamine salicylate (ASPERCREME EX) by Apply Externally route every other day. Patient not taking: Reported on 9/10/2021    Provider, Historical        Review of Systems   Genitourinary: Positive for dysuria and frequency. All other systems reviewed and are negative.        Physical Exam  Neck:      Thyroid: No thyromegaly or thyroid tenderness. Cardiovascular:      Rate and Rhythm: Regular rhythm. Heart sounds: Normal heart sounds. No murmur heard. Pulmonary:      Effort: Pulmonary effort is normal.      Breath sounds: Normal breath sounds. No wheezing or rales. Abdominal:      General: Bowel sounds are normal.      Palpations: There is no mass. Tenderness: There is no abdominal tenderness.          Visit Vitals  BP (!) 150/86   Pulse 74   Temp 98.4 °F (36.9 °C) (Temporal)   Resp 16   Ht 5' 2.5\" (1.588 m)   Wt 164 lb (74.4 kg)   LMP 01/01/2003   SpO2 100%   BMI 29.52 kg/m²         Jenni Cortes MD

## 2021-09-10 NOTE — TELEPHONE ENCOUNTER
Two sets of directions attached to escript for losartan, please call back with clarification     Angelica Hart [phone] - [place], [facility] (Pharmacy) [phone]

## 2021-09-11 LAB — TSH SERPL DL<=0.05 MIU/L-ACNC: 0.48 UIU/ML (ref 0.36–3.74)

## 2021-09-17 ENCOUNTER — TELEPHONE (OUTPATIENT)
Dept: INTERNAL MEDICINE CLINIC | Age: 66
End: 2021-09-17

## 2021-09-17 NOTE — TELEPHONE ENCOUNTER
Reason for call:      Ms.Daysi Zenaida Wilson called stating that isn't able to come in today to have her blood pressure checked; doesn't have a ride to come to office    Patient still has a nurse visit appt for 10/15    Is this a new problem: no     Date of last appointment:  9/10/2021     Can we respond via [employer]: no    Best call back number: [phone]

## 2021-10-06 ENCOUNTER — CLINICAL SUPPORT (OUTPATIENT)
Dept: INTERNAL MEDICINE CLINIC | Age: 66
End: 2021-10-06

## 2021-10-06 VITALS — DIASTOLIC BLOOD PRESSURE: 81 MMHG | OXYGEN SATURATION: 99 % | SYSTOLIC BLOOD PRESSURE: 148 MMHG | HEART RATE: 71 BPM

## 2021-10-06 DIAGNOSIS — I10 HYPERTENSION, ESSENTIAL: Primary | ICD-10-CM

## 2021-10-06 NOTE — PROGRESS NOTES
BP check. Asking if okay to get Covid booster, [employer]. Per Dr. Pura Knight, yes.   LM on her cell phone

## 2021-10-08 NOTE — PROGRESS NOTES
BP elevated. It is unchanged. Verify she is taking losartan 50mg once a day and amlodipine 5mg once a day. Losartan dose increased from 25mg to 50mg at last visit. If she is, then needs to increase to 50mg, 2 tabs daily. RTC in 1 month for repeat BP and labs. If she is not taking 50mg, needs to start, then RTC in 1 month for repeat BP and labs.       BP Readings from Last 3 Encounters:   10/06/21 (!) 148/81   09/10/21 (!) 150/86   03/05/21 (!) 158/84

## 2021-10-11 ENCOUNTER — TELEPHONE (OUTPATIENT)
Dept: INTERNAL MEDICINE CLINIC | Age: 66
End: 2021-10-11

## 2021-10-11 NOTE — TELEPHONE ENCOUNTER
Reason for call:  Ms.Daysi Kelley Fowler called about her bp    Would like to know if there is a prescription she can take that isn't generic/other ways to help lower her bp    Pls return phone call    Is this a new problem: no     Date of last appointment:  10/6/2021     Can we respond via [employer]: no    Best call back number: [phone]

## 2021-10-11 NOTE — TELEPHONE ENCOUNTER
RC to patient, identified with 2 identifiers. She wanted to know if she could take the metoprolol at the same time or keep twice a day. Told her keep twice a day. Also, told her she could take the Losartan 50 mg-2 tabs at the same time ((100 mg) as opposed to twice a day. She wasn't asking anything about generic. She has appointment scheduled with Dr. Trini Benitez 11/29/21.

## 2021-10-11 NOTE — PROGRESS NOTES
Call to patient, identified with 2 identifiers. Advised of Dr. Jem Fuentes' note and recommendations. Scheduled for OV with Dr. Jem Fuentes 11/29/21. Will verify with him and not just a NV.

## 2021-10-15 ENCOUNTER — TRANSCRIBE ORDER (OUTPATIENT)
Dept: SCHEDULING | Age: 66
End: 2021-10-15

## 2021-10-15 DIAGNOSIS — Z12.31 SCREENING MAMMOGRAM FOR HIGH-RISK PATIENT: Primary | ICD-10-CM

## 2021-11-16 ENCOUNTER — TELEPHONE (OUTPATIENT)
Dept: INTERNAL MEDICINE CLINIC | Age: 66
End: 2021-11-16

## 2021-11-16 NOTE — TELEPHONE ENCOUNTER
CT patient, advised she did have pneumonia shot in 12/2020. She just wanted to make sure. Also, reports dry cough she thinks may be due to Losartan. Advised not usually a side effect of Losartan. She will give it more time and see if perhaps not more likely related to allergies.

## 2021-11-16 NOTE — TELEPHONE ENCOUNTER
Patient is supposed to have pneumonia shot, but she was told she already had it when she went to get it. She says she does not remember getting it and would like some help clearing up if she has actually had it already or not.      Kd Rodriguez (Self) [phone] (H)

## 2021-11-26 ENCOUNTER — HOSPITAL ENCOUNTER (OUTPATIENT)
Dept: MAMMOGRAPHY | Age: 66
Discharge: HOME | End: 2021-11-26
Attending: INTERNAL MEDICINE
Payer: MEDICARE

## 2021-11-26 DIAGNOSIS — Z12.31 SCREENING MAMMOGRAM FOR HIGH-RISK PATIENT: ICD-10-CM

## 2021-11-26 PROCEDURE — 77067 SCR MAMMO BI INCL CAD: CPT

## 2021-11-29 ENCOUNTER — OFFICE VISIT (OUTPATIENT)
Dept: INTERNAL MEDICINE CLINIC | Age: 66
End: 2021-11-29
Payer: MEDICARE

## 2021-11-29 VITALS
BODY MASS INDEX: 28.88 KG/M2 | SYSTOLIC BLOOD PRESSURE: 142 MMHG | HEART RATE: 65 BPM | DIASTOLIC BLOOD PRESSURE: 82 MMHG | WEIGHT: 163 LBS | TEMPERATURE: 98.4 F | RESPIRATION RATE: 16 BRPM | HEIGHT: 63 IN | OXYGEN SATURATION: 98 %

## 2021-11-29 DIAGNOSIS — E03.4 HYPOTHYROIDISM DUE TO ACQUIRED ATROPHY OF THYROID: ICD-10-CM

## 2021-11-29 DIAGNOSIS — I10 HYPERTENSION, ESSENTIAL: Primary | ICD-10-CM

## 2021-11-29 LAB
ALBUMIN SERPL-MCNC: 4 G/DL (ref 3.5–5)
ALBUMIN/GLOB SERPL: 1.1 {RATIO} (ref 1.1–2.2)
ALP SERPL-CCNC: 114 U/L (ref 45–117)
ALT SERPL-CCNC: 26 U/L (ref 12–78)
ANION GAP SERPL CALC-SCNC: 5 MMOL/L (ref 5–15)
AST SERPL-CCNC: 20 U/L (ref 15–37)
BILIRUB SERPL-MCNC: 0.5 MG/DL (ref 0.2–1)
BUN SERPL-MCNC: 9 MG/DL (ref 6–20)
BUN/CREAT SERPL: 15 (ref 12–20)
CALCIUM SERPL-MCNC: 9.5 MG/DL (ref 8.5–10.1)
CHLORIDE SERPL-SCNC: 106 MMOL/L (ref 97–108)
CHOLEST SERPL-MCNC: 201 MG/DL
CO2 SERPL-SCNC: 27 MMOL/L (ref 21–32)
CREAT SERPL-MCNC: 0.59 MG/DL (ref 0.55–1.02)
GLOBULIN SER CALC-MCNC: 3.6 G/DL (ref 2–4)
GLUCOSE SERPL-MCNC: 91 MG/DL (ref 65–100)
HDLC SERPL-MCNC: 59 MG/DL
HDLC SERPL: 3.4 {RATIO} (ref 0–5)
LDLC SERPL CALC-MCNC: 126 MG/DL (ref 0–100)
POTASSIUM SERPL-SCNC: 3.7 MMOL/L (ref 3.5–5.1)
PROT SERPL-MCNC: 7.6 G/DL (ref 6.4–8.2)
SODIUM SERPL-SCNC: 138 MMOL/L (ref 136–145)
TRIGL SERPL-MCNC: 80 MG/DL (ref ?–150)
VLDLC SERPL CALC-MCNC: 16 MG/DL

## 2021-11-29 PROCEDURE — G8754 DIAS BP LESS 90: HCPCS | Performed by: INTERNAL MEDICINE

## 2021-11-29 PROCEDURE — G8536 NO DOC ELDER MAL SCRN: HCPCS | Performed by: INTERNAL MEDICINE

## 2021-11-29 PROCEDURE — G8753 SYS BP > OR = 140: HCPCS | Performed by: INTERNAL MEDICINE

## 2021-11-29 PROCEDURE — G8432 DEP SCR NOT DOC, RNG: HCPCS | Performed by: INTERNAL MEDICINE

## 2021-11-29 PROCEDURE — 99214 OFFICE O/P EST MOD 30 MIN: CPT | Performed by: INTERNAL MEDICINE

## 2021-11-29 PROCEDURE — G8427 DOCREV CUR MEDS BY ELIG CLIN: HCPCS | Performed by: INTERNAL MEDICINE

## 2021-11-29 PROCEDURE — G9899 SCRN MAM PERF RSLTS DOC: HCPCS | Performed by: INTERNAL MEDICINE

## 2021-11-29 PROCEDURE — 3017F COLORECTAL CA SCREEN DOC REV: CPT | Performed by: INTERNAL MEDICINE

## 2021-11-29 PROCEDURE — 1101F PT FALLS ASSESS-DOCD LE1/YR: CPT | Performed by: INTERNAL MEDICINE

## 2021-11-29 PROCEDURE — G8399 PT W/DXA RESULTS DOCUMENT: HCPCS | Performed by: INTERNAL MEDICINE

## 2021-11-29 PROCEDURE — G8419 CALC BMI OUT NRM PARAM NOF/U: HCPCS | Performed by: INTERNAL MEDICINE

## 2021-11-29 PROCEDURE — G0463 HOSPITAL OUTPT CLINIC VISIT: HCPCS | Performed by: INTERNAL MEDICINE

## 2021-11-29 PROCEDURE — 1090F PRES/ABSN URINE INCON ASSESS: CPT | Performed by: INTERNAL MEDICINE

## 2021-11-29 RX ORDER — METOPROLOL TARTRATE 50 MG/1
TABLET ORAL
Qty: 180 TABLET | Refills: 1 | Status: SHIPPED | OUTPATIENT
Start: 2021-11-29 | End: 2022-07-02

## 2021-11-29 RX ORDER — LEVOTHYROXINE SODIUM 137 UG/1
TABLET ORAL
Qty: 90 TABLET | Refills: 1 | Status: SHIPPED | OUTPATIENT
Start: 2021-11-29 | End: 2022-05-23

## 2021-11-29 RX ORDER — LOSARTAN POTASSIUM AND HYDROCHLOROTHIAZIDE 12.5; 1 MG/1; MG/1
1 TABLET ORAL DAILY
Qty: 90 TABLET | Refills: 1 | Status: SHIPPED | OUTPATIENT
Start: 2021-11-29 | End: 2022-10-09

## 2021-11-29 NOTE — PROGRESS NOTES
Felipa Daniel is a 77 y.o. female who was seen in clinic today (11/29/2021). Assessment & Plan:   Below is the assessment and plan developed based on review of pertinent history, physical exam, labs, studies, and medications. 1. Hypertension, essential  Assessment & Plan:  Not ideally controlled, no real improvement with increase in losartan. Will add in HCTZ, she did well w/ 12.5mg but did not do well with 25mg. Will check labs today   Orders:  -     metoprolol tartrate (LOPRESSOR) 50 mg tablet; TAKE ONE TABLET BY MOUTH TWICE A DAY, Normal, Disp-180 Tablet, R-1  -     losartan-hydroCHLOROthiazide (HYZAAR) 100-12.5 mg per tablet; Take 1 Tablet by mouth daily. , Normal, Disp-90 Tablet, R-1  -     LIPID PANEL; Future  -     METABOLIC PANEL, COMPREHENSIVE; Future  -     CBC WITH AUTOMATED DIFF  2. Hypothyroidism due to acquired atrophy of thyroid  Comments:  medication refilled, problem not addressed today  Orders:  -     levothyroxine (SYNTHROID) 137 mcg tablet; TAKE ONE TABLET BY MOUTH DAILY EXCEPT SKIP DOSE ON SUNDAYS, Normal, Disp-90 Tablet, R-1  -     TSH 3RD GENERATION      Follow-up and Dispositions    · Return in about 4 months (around 3/29/2022) for Regular follow up. Subjective/Objective:   Kristi was seen today for Hypertension      Since last visit: losartan dose increased due to elevated BP. She has been taking 50mg tablet, one in the AM and one at night. She reports taking medications as instructed, no medication side effects noted, home BP monitoring in range of 076-765'I systolic over 67-08'S diastolic. Prior to Admission medications    Medication Sig Start Date End Date Taking? Authorizing Provider   levothyroxine (SYNTHROID) 137 mcg tablet TAKE ONE TABLET BY MOUTH DAILY EXCEPT SKIP DOSE ON SUNDAYS 9/10/21  Yes Blue Vallejo MD   loratadine (CLARITIN) 10 mg tablet Take 1 Tablet by mouth daily.  9/10/21  Yes Blue Vallejo MD   losartan (COZAAR) 50 mg tablet Take 1 Tablet by mouth daily. 9/10/21  Yes Gilberto Durán MD   metoprolol tartrate (LOPRESSOR) 50 mg tablet TAKE ONE TABLET BY MOUTH TWICE A DAY 3/5/21  Yes Gilberto Durán MD   calcium carbonate/vitamin D3 (CALCIUM 600 + D,3, PO) Take  by mouth every other day. Yes Provider, Historical   diclofenac (Voltaren) 1 % gel Apply 2 g to affected area daily. R knee   Yes Provider, Historical   trolamine salicylate (ASPERCREME EX) by Apply Externally route every other day. Yes Provider, Historical   amLODIPine (NORVASC) 5 mg tablet Take 5 mg by mouth daily. Yes Provider, Historical   cyanocobalamin (VITAMIN B-12) 1,000 mcg tablet Take 1,000 mcg by mouth every other day. Yes Provider, Historical   acetaminophen (TYLENOL) 325 mg tablet Take  by mouth every four (4) hours as needed for Pain. Yes Provider, Historical   aspirin 81 mg tablet Take 1 Tab by mouth daily. 11/18/11  Yes Gilberto Durán MD   multivitamin (ONE A DAY) tablet Take 1 Tab by mouth daily. 5/31/11  Yes Provider, Historical        Physical Exam  Cardiovascular:      Rate and Rhythm: Regular rhythm. Heart sounds: Normal heart sounds. No murmur heard. Pulmonary:      Effort: Pulmonary effort is normal.      Breath sounds: Normal breath sounds. Musculoskeletal:      Right lower leg: No edema. Left lower leg: No edema.    Psychiatric:         Mood and Affect: Mood normal.         Behavior: Behavior normal.       Visit Vitals  BP (!) 142/82   Pulse 65   Temp 98.4 °F (36.9 °C) (Temporal)   Resp 16   Ht 5' 2.5\" (1.588 m)   Wt 163 lb (73.9 kg)   LMP 01/01/2003   SpO2 98%   BMI 29.34 kg/m²       Braulio Wright MD

## 2021-11-30 NOTE — ASSESSMENT & PLAN NOTE
Not ideally controlled, no real improvement with increase in losartan. Will add in HCTZ, she did well w/ 12.5mg but did not do well with 25mg.   Will check labs today

## 2021-12-01 NOTE — PROGRESS NOTES
Results released to patient via [employer]. All labs are stable or at goal for her.   CBC and TSH were not ordered, but pulled in by [employer] (previously ordered , linked to these problems, but never resulted)

## 2021-12-16 ENCOUNTER — VIRTUAL VISIT (OUTPATIENT)
Dept: INTERNAL MEDICINE CLINIC | Age: 66
End: 2021-12-16
Payer: MEDICARE

## 2021-12-16 ENCOUNTER — TELEPHONE (OUTPATIENT)
Dept: INTERNAL MEDICINE CLINIC | Age: 66
End: 2021-12-16

## 2021-12-16 DIAGNOSIS — J30.9 ALLERGIC RHINITIS, UNSPECIFIED SEASONALITY, UNSPECIFIED TRIGGER: Primary | ICD-10-CM

## 2021-12-16 PROCEDURE — 1090F PRES/ABSN URINE INCON ASSESS: CPT | Performed by: NURSE PRACTITIONER

## 2021-12-16 PROCEDURE — G8432 DEP SCR NOT DOC, RNG: HCPCS | Performed by: NURSE PRACTITIONER

## 2021-12-16 PROCEDURE — 1101F PT FALLS ASSESS-DOCD LE1/YR: CPT | Performed by: NURSE PRACTITIONER

## 2021-12-16 PROCEDURE — 3017F COLORECTAL CA SCREEN DOC REV: CPT | Performed by: NURSE PRACTITIONER

## 2021-12-16 PROCEDURE — G8427 DOCREV CUR MEDS BY ELIG CLIN: HCPCS | Performed by: NURSE PRACTITIONER

## 2021-12-16 PROCEDURE — G8399 PT W/DXA RESULTS DOCUMENT: HCPCS | Performed by: NURSE PRACTITIONER

## 2021-12-16 PROCEDURE — G8756 NO BP MEASURE DOC: HCPCS | Performed by: NURSE PRACTITIONER

## 2021-12-16 PROCEDURE — 99213 OFFICE O/P EST LOW 20 MIN: CPT | Performed by: NURSE PRACTITIONER

## 2021-12-16 PROCEDURE — G9899 SCRN MAM PERF RSLTS DOC: HCPCS | Performed by: NURSE PRACTITIONER

## 2021-12-16 PROCEDURE — G0463 HOSPITAL OUTPT CLINIC VISIT: HCPCS | Performed by: NURSE PRACTITIONER

## 2021-12-16 NOTE — PROGRESS NOTES
Colletta Balo is a 77 y.o. female who was seen by synchronous (real-time) audio-video technology on 12/16/2021. Assessment & Plan:   Below is the assessment and plan developed based on review of pertinent history, physical exam (if applicable), labs, studies, and medications. 1. Allergic rhinitis, unspecified seasonality, unspecified trigger  continue allegra x 2 weeks  flonase x 2 weeks  Follow up if no improvement over the next 2 weeks    I spent at least 23 minutes on this visit with this established patient. ([id number])  Subjective:   Colletta Balo was seen for Nasal Congestion      Patient reports runny nose starting 1 week ago. No fever, chills, or body aches. Congestion is clear. She has taken allegra x 5 days with some relief. She has also taken coricidin with relief. No known sick contacts reported. Prior to Admission medications    Medication Sig Start Date End Date Taking? Authorizing Provider   metoprolol tartrate (LOPRESSOR) 50 mg tablet TAKE ONE TABLET BY MOUTH TWICE A DAY 11/29/21   Jolanta Adorno MD   levothyroxine (SYNTHROID) 137 mcg tablet TAKE ONE TABLET BY MOUTH DAILY EXCEPT SKIP DOSE ON SUNDAYS 11/29/21   Jolanta Adorno MD   losartan-hydroCHLOROthiazide [facility]) 100-12.5 mg per tablet Take 1 Tablet by mouth daily. 11/29/21   Jolanta Adorno MD   loratadine (CLARITIN) 10 mg tablet Take 1 Tablet by mouth daily. 9/10/21   Jolanta Adorno MD   calcium carbonate/vitamin D3 (CALCIUM 600 + D,3, PO) Take  by mouth every other day. Provider, Historical   diclofenac (Voltaren) 1 % gel Apply 2 g to affected area daily. R knee    Provider, Historical   trolamine salicylate (ASPERCREME EX) by Apply Externally route every other day. Provider, Historical   amLODIPine (NORVASC) 5 mg tablet Take 5 mg by mouth daily. Provider, Historical   cyanocobalamin (VITAMIN B-12) 1,000 mcg tablet Take 1,000 mcg by mouth every other day.     Provider, Historical   acetaminophen (TYLENOL) 325 mg tablet Take  by mouth every four (4) hours as needed for Pain. Provider, Historical   aspirin 81 mg tablet Take 1 Tab by mouth daily. 11/18/11   Mariam Houser MD   multivitamin (ONE A DAY) tablet Take 1 Tab by mouth daily. 5/31/11   Provider, Historical       Patient Active Problem List   Diagnosis Code    Hypertension, essential I5    Hypothyroid E03.9    Microscopic hematuria R31.29    GERD (gastroesophageal reflux disease) K21.9    Atrial tachycardia (HCC) I47.1    Abnormal mammogram of right breast R92.8    Palpitation R00.2    Osteopenia of left hip M85.852     Patient Active Problem List    Diagnosis Date Noted    Osteopenia of left hip 09/17/2020    Palpitation 04/03/2020    Abnormal mammogram of right breast 09/19/2017    Atrial tachycardia ([address] 11/09/2012    GERD (gastroesophageal reflux disease)     Microscopic hematuria 01/01/2011    Hypothyroid 09/23/2010    Hypertension, essential      Current Outpatient Medications   Medication Sig Dispense Refill    metoprolol tartrate (LOPRESSOR) 50 mg tablet TAKE ONE TABLET BY MOUTH TWICE A  Tablet 1    levothyroxine (SYNTHROID) 137 mcg tablet TAKE ONE TABLET BY MOUTH DAILY EXCEPT SKIP DOSE ON SUNDAYS 90 Tablet 1    losartan-hydroCHLOROthiazide (HYZAAR) 100-12.5 mg per tablet Take 1 Tablet by mouth daily. 90 Tablet 1    loratadine (CLARITIN) 10 mg tablet Take 1 Tablet by mouth daily. 90 Tablet 1    calcium carbonate/vitamin D3 (CALCIUM 600 + D,3, PO) Take  by mouth every other day.  diclofenac (Voltaren) 1 % gel Apply 2 g to affected area daily. R knee      trolamine salicylate (ASPERCREME EX) by Apply Externally route every other day.  amLODIPine (NORVASC) 5 mg tablet Take 5 mg by mouth daily.  cyanocobalamin (VITAMIN B-12) 1,000 mcg tablet Take 1,000 mcg by mouth every other day.  acetaminophen (TYLENOL) 325 mg tablet Take  by mouth every four (4) hours as needed for Pain.       aspirin 81 mg tablet Take 1 Tab by mouth daily.  multivitamin (ONE A DAY) tablet Take 1 Tab by mouth daily. Allergies   Allergen Reactions    Lisinopril Cough    Simvastatin Myalgia     Past Medical History:   Diagnosis Date    GERD (gastroesophageal reflux disease)     Hypercholesterolemia     Hyperlipidemia 5/26/2011    Hypertension     Hypothyroid     Microscopic hematuria 1/2011    - Dr Lidia Levels, w/u negative    Other ill-defined conditions(799.89)     states may have sleep apnea but never tested     Past Surgical History:   Procedure Laterality Date    HX CHOLECYSTECTOMY  6/2/11    HX COLONOSCOPY  6/21/12    HX HEART CATHETERIZATION  04/03/2020    Minor coronary disease--non obstructive with no angiographic evidence of acute plaque rupture    HX HERNIA REPAIR      HX HYSTERECTOMY  2003    for fibroids, cervix is present     Family History   Problem Relation Age of Onset    Other Mother         uterine fibroids    Post-op Nausea/Vomiting Mother     High Cholesterol Father     Coronary Art Dis Father      Social History     Tobacco Use    Smoking status: Never Smoker    Smokeless tobacco: Never Used   Substance Use Topics    Alcohol use: No       ROS - per HPI      Objective:     Patient-Reported Vitals 1/21/2021   Patient-Reported Systolic  465   Patient-Reported Diastolic 87     General: alert, cooperative, no distress   Mental  status: normal mood, behavior, speech, dress, motor activity, and thought processes, able to follow commands   Eyes: EOM intact, normal sclera   Mouth: mucous membranes moist   Neck: no visualized mass   Resp: normal effort and no respiratory distress   Neuro: no gross deficits   Musculoskeletal: normal ROM of neck and normal gait w/o ataxia   Skin: no discoloration or lesions of concern on visible areas   Psychiatric: normal affect, no hallucinations   + clear runny nose    Kristi Lockhartpatrick, was evaluated through a synchronous (real-time) audio-video encounter. The patient (or guardian if applicable) is aware that this is a billable service. Verbal consent to proceed has been obtained within the past 12 months. The visit was conducted pursuant to the emergency declaration under the [address] [place] [address] and the [employer] Act. Patient identification was verified, and a caregiver was present when appropriate. The patient was located in a state where the provider was credentialed to provide care.      Stacey Zuniga NP

## 2022-03-19 PROBLEM — R00.2 PALPITATION: Status: ACTIVE | Noted: 2020-04-03

## 2022-03-19 PROBLEM — M85.852 OSTEOPENIA OF LEFT HIP: Status: ACTIVE | Noted: 2020-09-17

## 2022-03-20 PROBLEM — R92.8 ABNORMAL MAMMOGRAM OF RIGHT BREAST: Status: ACTIVE | Noted: 2017-09-19

## 2022-03-25 ENCOUNTER — OFFICE VISIT (OUTPATIENT)
Dept: INTERNAL MEDICINE CLINIC | Age: 67
End: 2022-03-25

## 2022-03-25 VITALS
WEIGHT: 165.8 LBS | DIASTOLIC BLOOD PRESSURE: 86 MMHG | HEART RATE: 74 BPM | BODY MASS INDEX: 29.38 KG/M2 | HEIGHT: 63 IN | OXYGEN SATURATION: 95 % | SYSTOLIC BLOOD PRESSURE: 156 MMHG | RESPIRATION RATE: 16 BRPM | TEMPERATURE: 98 F

## 2022-03-25 DIAGNOSIS — E03.4 HYPOTHYROIDISM DUE TO ACQUIRED ATROPHY OF THYROID: ICD-10-CM

## 2022-03-25 DIAGNOSIS — I47.1 ATRIAL TACHYCARDIA (HCC): ICD-10-CM

## 2022-03-25 DIAGNOSIS — E78.00 PURE HYPERCHOLESTEROLEMIA: ICD-10-CM

## 2022-03-25 DIAGNOSIS — I10 HYPERTENSION, ESSENTIAL: ICD-10-CM

## 2022-03-25 DIAGNOSIS — K21.9 GASTROESOPHAGEAL REFLUX DISEASE WITHOUT ESOPHAGITIS: ICD-10-CM

## 2022-03-25 DIAGNOSIS — Z00.00 MEDICARE ANNUAL WELLNESS VISIT, SUBSEQUENT: Primary | ICD-10-CM

## 2022-03-25 DIAGNOSIS — Z71.89 ADVANCED CARE PLANNING/COUNSELING DISCUSSION: ICD-10-CM

## 2022-03-25 DIAGNOSIS — Z12.11 COLON CANCER SCREENING: ICD-10-CM

## 2022-03-25 DIAGNOSIS — Z23 ENCOUNTER FOR IMMUNIZATION: ICD-10-CM

## 2022-03-25 DIAGNOSIS — M85.852 OSTEOPENIA OF LEFT HIP: ICD-10-CM

## 2022-03-25 PROCEDURE — G0439 PPPS, SUBSEQ VISIT: HCPCS | Performed by: INTERNAL MEDICINE

## 2022-03-25 PROCEDURE — 99214 OFFICE O/P EST MOD 30 MIN: CPT | Performed by: INTERNAL MEDICINE

## 2022-03-25 PROCEDURE — G8427 DOCREV CUR MEDS BY ELIG CLIN: HCPCS | Performed by: INTERNAL MEDICINE

## 2022-03-25 PROCEDURE — 1090F PRES/ABSN URINE INCON ASSESS: CPT | Performed by: INTERNAL MEDICINE

## 2022-03-25 PROCEDURE — G8419 CALC BMI OUT NRM PARAM NOF/U: HCPCS | Performed by: INTERNAL MEDICINE

## 2022-03-25 PROCEDURE — G8753 SYS BP > OR = 140: HCPCS | Performed by: INTERNAL MEDICINE

## 2022-03-25 PROCEDURE — G8754 DIAS BP LESS 90: HCPCS | Performed by: INTERNAL MEDICINE

## 2022-03-25 PROCEDURE — G9899 SCRN MAM PERF RSLTS DOC: HCPCS | Performed by: INTERNAL MEDICINE

## 2022-03-25 PROCEDURE — G8510 SCR DEP NEG, NO PLAN REQD: HCPCS | Performed by: INTERNAL MEDICINE

## 2022-03-25 PROCEDURE — G8399 PT W/DXA RESULTS DOCUMENT: HCPCS | Performed by: INTERNAL MEDICINE

## 2022-03-25 PROCEDURE — 1101F PT FALLS ASSESS-DOCD LE1/YR: CPT | Performed by: INTERNAL MEDICINE

## 2022-03-25 PROCEDURE — 3017F COLORECTAL CA SCREEN DOC REV: CPT | Performed by: INTERNAL MEDICINE

## 2022-03-25 PROCEDURE — G8536 NO DOC ELDER MAL SCRN: HCPCS | Performed by: INTERNAL MEDICINE

## 2022-03-25 RX ORDER — EZETIMIBE 10 MG/1
10 TABLET ORAL DAILY
Qty: 90 TABLET | Refills: 1 | Status: SHIPPED | OUTPATIENT
Start: 2022-03-25

## 2022-03-25 NOTE — ASSESSMENT & PLAN NOTE
Not at goal, had issues w/ statin x2, will try zetia assuming it is affordable.   The 10-year ASCVD risk score (Deepak Meehan., et al., 2013) is: 11.8%

## 2022-03-25 NOTE — PROGRESS NOTES
Mitali Carpenter is a 77 y.o. female who was seen in clinic today (3/25/2022) for a full physical.             Assessment & Plan:   Below is the assessment and plan developed based on review of pertinent history, physical exam, labs, studies, and medications. 1. Medicare annual wellness visit, subsequent  2. Advanced care planning/counseling discussion  3. Encounter for immunization  -     diph,Pertuss,AC,,Tet Vac-PF (BOOSTRIX) 2.5-8-5 Lf-mcg suspension; 0.5 mL by IntraMUSCular route once for 1 dose., Print, Disp-0.5 mL, R-0  -     varicella-zoster recombinant, PF, (SHINGRIX) 50 mcg/0.5 mL susr injection; 0.5 mL IM once now and then repeat in 2-6 months, Print, Disp-0.5 mL, R-1  4. Colon cancer screening  -     REFERRAL TO GASTROENTEROLOGY  5. Hypertension, essential  Assessment & Plan:  Uncontrolled, has not been taking losartan regularly, she will start and let me know if there are any problems, will defer labs   6. Hypothyroidism due to acquired atrophy of thyroid  Assessment & Plan:  well controlled, continue current treatment    7. Osteopenia of left hip  Assessment & Plan:  Asymptomatic, to early to repeat imaging, due in Sept '22, will revisit at f/u   8. Atrial tachycardia ([address]  Assessment & Plan:  Rare symptoms, on BB, monitored by specialist. No acute findings meriting change in the plan  9. Pure hypercholesterolemia  Assessment & Plan:  Not at goal, had issues w/ statin x2, will try zetia assuming it is affordable. The 10-year ASCVD risk score (Pablo Powers., et al., 2013) is: 11.8%    Orders:  -     ezetimibe (ZETIA) 10 mg tablet; Take 1 Tablet by mouth daily. , Normal, Disp-90 Tablet, R-1  10. Gastroesophageal reflux disease without esophagitis  Assessment & Plan:  Well controlled off medications     Follow-up and Dispositions    · Return in about 1 month (around 4/25/2022) for Nurse visit for blood pressure check and then 6 months regular follow up.          Subjective:   Kristen Bernal is here today for a full physical.      Annual Wellness Visit- Subsequent Visit    Since last visit: she missed her last visit. The following acute and/or chronic problems were addressed today:    Cardiovascular Review  The patient has hypertension. She reports not taking medications regularly as instructed - she is taking losartan every other day, she was worried it was causing kidney pain. No medication side effects noted, home BP monitoring in range of 613'W systolic over 33'T diastolic. Endocrine Review  Patient is seen for followup of hypothyroidism. She reports medication compliance: all the time and is taking separate from all other meds. She reports the following concerns/problems/med side effects: none. End of Life Planning: This was discussed with her today and she has NO advanced directive  - add't info provided. Reviewed DNR/DNI and patient is not interested. Depression Screen:  3 most recent PHQ Screens 3/25/2022   Little interest or pleasure in doing things Not at all   Feeling down, depressed, irritable, or hopeless Not at all   Total Score PHQ 2 0   Trouble falling or staying asleep, or sleeping too much -   Feeling tired or having little energy -   Poor appetite, weight loss, or overeating -   Feeling bad about yourself - or that you are a failure or have let yourself or your family down -   Trouble concentrating on things such as school, work, reading, or watching TV -   Moving or speaking so slowly that other people could have noticed; or the opposite being so fidgety that others notice -   How difficult have these problems made it for you to do your work, take care of your home and get along with others -         Fall Risk:   Fall Risk Assessment, last 12 mths 3/25/2022   Able to walk? Yes   Fall in past 12 months? 0   Do you feel unsteady? 0   Are you worried about falling 0       Abuse Screen:  Abuse Screening Questionnaire 3/25/2022   Do you ever feel afraid of your partner?  N   Are you in a relationship with someone who physically or mentally threatens you? N   Is it safe for you to go home? Y       Do you average more than 1 drink per night or more than 7 drinks a week:  No    On any one occasion in the past three months have you have had more than 3 drinks containing alcohol:  No          Health Maintenance:   Daily Aspirin: yes  Bone Density: 9/17/20 - osteopenia (L hip)    Immunizations:   Covid: up to date  Influenza: up to date  Tetanus: not UTD- script provided  Shingles: due for Shingrix - script provided  Pneumonia: up to date  Cancer screening:   Cervical: reviewed guidelines, n/a  Breast: reviewed guidelines, up to date  Colon: reviewed guidelines, referral placed       Functional Ability and Level of Safety:    Hearing: Hearing is good. Cognition Screen:   Has your family/caregiver stated any concerns about your memory: no  Cognitive Screening: Normal - Clock Drawing Test     Ambulation: with no difficulty     Activities of Daily Living: The home contains: no safety equipment. Patient does total self care    Exercise: very active    Adult Nutrition Screen:  No risk factors noted. Patient Care Team:  Caryl Bergman MD as PCP - General  Caryl Bergman MD as PCP - [address] [place] [address]  [facility] Provider  Se Jade MD (Cardio Vascular Surgery)       The following sections were reviewed & updated as appropriate: Problem List, Allergies, PMH, PSH, FH, and SH. Prior to Admission medications    Medication Sig Start Date End Date Taking? Authorizing Provider   metoprolol tartrate (LOPRESSOR) 50 mg tablet TAKE ONE TABLET BY MOUTH TWICE A DAY 11/29/21  Yes Caryl Bergman MD   levothyroxine (SYNTHROID) 137 mcg tablet TAKE ONE TABLET BY MOUTH DAILY EXCEPT SKIP DOSE ON SUNDAYS 11/29/21  Yes Caryl Bergman MD   losartan-hydroCHLOROthiazide [facility]) 100-12.5 mg per tablet Take 1 Tablet by mouth daily.  11/29/21  Yes Caryl Bergman MD   loratadine (CLARITIN) 10 mg tablet Take 1 Tablet by mouth daily. 9/10/21  Yes Deni Chen MD   calcium carbonate/vitamin D3 (CALCIUM 600 + D,3, PO) Take  by mouth every other day. Yes Provider, Historical   diclofenac (Voltaren) 1 % gel Apply 2 g to affected area daily. R knee   Yes Provider, Historical   trolamine salicylate (ASPERCREME EX) by Apply Externally route every other day. Yes Provider, Historical   amLODIPine (NORVASC) 5 mg tablet Take 5 mg by mouth daily. Yes Provider, Historical   cyanocobalamin (VITAMIN B-12) 1,000 mcg tablet Take 1,000 mcg by mouth every other day. Yes Provider, Historical   acetaminophen (TYLENOL) 325 mg tablet Take  by mouth every four (4) hours as needed for Pain. Yes Provider, Historical   aspirin 81 mg tablet Take 1 Tab by mouth daily. 11/18/11  Yes Deni Chen MD   multivitamin (ONE A DAY) tablet Take 1 Tab by mouth daily. 5/31/11  Yes Provider, Historical          Review of Systems   Constitutional: Negative for malaise/fatigue and weight loss. Respiratory: Negative for cough and shortness of breath. Cardiovascular: Negative for chest pain, palpitations and leg swelling. Gastrointestinal: Negative for abdominal pain, constipation, diarrhea, heartburn, nausea and vomiting. Musculoskeletal: Positive for joint pain (mostly knees). Negative for myalgias. Skin: Negative for rash. Neurological: Negative for dizziness and headaches. Psychiatric/Behavioral: Negative for depression. The patient is not nervous/anxious and does not have insomnia. Objective:   Physical Exam  Constitutional:       General: She is not in acute distress. Appearance: Normal appearance. Eyes:      Conjunctiva/sclera: Conjunctivae normal.   Neck:      Thyroid: No thyromegaly. Cardiovascular:      Rate and Rhythm: Regular rhythm. Heart sounds: No murmur heard. Pulmonary:      Effort: Pulmonary effort is normal.      Breath sounds: Normal breath sounds.  No decreased breath sounds or wheezing. Abdominal:      General: Bowel sounds are normal.      Palpations: Abdomen is soft. Tenderness: There is no abdominal tenderness. Musculoskeletal:      Right lower leg: No edema. Left lower leg: No edema.    Psychiatric:         Mood and Affect: Mood and affect normal.         Behavior: Behavior normal.          Visit Vitals  BP (!) 156/86   Pulse 74   Temp 98 °F (36.7 °C) (Tympanic)   Resp 16   Ht 5' 2.5\" (1.588 m)   Wt 165 lb 12.8 oz (75.2 kg)   LMP 01/01/2003   SpO2 95%   BMI 29.84 kg/m²          Ai Schmitz MD

## 2022-03-25 NOTE — PATIENT INSTRUCTIONS
Medicare Wellness Visit, Female     The best way to live healthy is to have a lifestyle where you eat a well-balanced diet, exercise regularly, limit alcohol use, and quit all forms of tobacco/nicotine, if applicable. Regular preventive services are another way to keep healthy. Preventive services (vaccines, screening tests, monitoring & exams) can help personalize your care plan, which helps you manage your own care. Screening tests can find health problems at the earliest stages, when they are easiest to treat. [facility] follows the current, evidence-based guidelines published by the [facility] ([facility]) when recommending preventive services for our patients. Because we follow these guidelines, sometimes recommendations change over time as research supports it. (For example, mammograms used to be recommended annually. Even though Medicare will still pay for an annual mammogram, the newer guidelines recommend a mammogram every two years for women of average risk). Of course, you and your doctor may decide to screen more often for some diseases, based on your risk and your co-morbidities (chronic disease you are already diagnosed with). Preventive services for you include:  - Medicare offers their members a free annual wellness visit, which is time for you and your primary care provider to discuss and plan for your preventive service needs. Take advantage of this benefit every year!  -All adults over the age of 72 should receive the recommended pneumonia vaccines. Current USPSTF guidelines recommend a series of two vaccines for the best pneumonia protection.   -All adults should have a flu vaccine yearly and a tetanus vaccine every 10 years.   -All adults age 48 and older should receive the shingles vaccines (series of two vaccines).       -All adults age 38-68 who are overweight should have a diabetes screening test once every three years.   -All adults born between [birth year] and 1965 should be screened once for Hepatitis C.  -Other screening tests and preventive services for persons with diabetes include: an eye exam to screen for diabetic retinopathy, a kidney function test, a foot exam, and stricter control over your cholesterol.   -Cardiovascular screening for adults with routine risk involves an electrocardiogram (ECG) at intervals determined by your doctor.   -Colorectal cancer screenings should be done for adults age 54-65 with no increased risk factors for colorectal cancer. There are a number of acceptable methods of screening for this type of cancer. Each test has its own benefits and drawbacks. Discuss with your doctor what is most appropriate for you during your annual wellness visit. The different tests include: colonoscopy (considered the best screening method), a fecal occult blood test, a fecal DNA test, and sigmoidoscopy.    -A bone mass density test is recommended when a woman turns 65 to screen for osteoporosis. This test is only recommended one time, as a screening. Some providers will use this same test as a disease monitoring tool if you already have osteoporosis. -Breast cancer screenings are recommended every other year for women of normal risk, age 54-69.  -Cervical cancer screenings for women over age 72 are only recommended with certain risk factors. Here is a list of your current Health Maintenance items (your personalized list of preventive services) with a due date:  Health Maintenance Due   Topic Date Due    Shingles Vaccine (1 of 2) Never done    DTaP/Tdap/Td  (2 - Td or Tdap) 10/07/2020              Learning About Living [facility] [place]  What is a living will? A living will is a legal form you use to write down the kind of care you want at the end of your life. It is used by the health professionals who will treat you if you aren't able to decide for yourself.   If you put your wishes in writing, your loved ones and others will know what kind of care you want. They won't need to guess. This can ease your mind and be helpful to others. A living will is not the same as an estate or property will. An estate will explains what you want to happen with your money and property after you die. Is a living will a legal document? A living will is a legal document. Each state has its own laws about living menon. If you move to another state, make sure that your living will is legal in the state where you now live. Or you might use a universal form that has been approved by many states. This kind of form can sometimes be completed and stored online. Your electronic copy will then be available wherever you have a connection to the Internet. In most cases, doctors will respect your wishes even if you have a form from a different state. · You don't need an  to complete a living will. But legal advice can be helpful if your state's laws are unclear, your health history is complicated, or your family can't agree on what should be in your living will. · You can change your living will at any time. Some people find that their wishes about end-of-life care change as their health changes. · In addition to making a living will, think about completing a medical power of  form. This form lets you name the person you want to make end-of-life treatment decisions for you (your \"health care agent\") if you're not able to. Many hospitals and nursing homes will give you the forms you need to complete a living will and a medical power of . · Your living will is used only if you can't make or communicate decisions for yourself anymore. If you become able to make decisions again, you can accept or refuse any treatment, no matter what you wrote in your living will. · Your state may offer an online registry. This is a place where you can store your living will online so the doctors and nurses who need to treat you can find it right away.   What should you think about when creating a living will? Talk about your end-of-life wishes with your family members and your doctor. Let them know what you want. That way the people making decisions for you won't be surprised by your choices. Think about these questions as you make your living will:  · Do you know enough about life support methods that might be used? If not, talk to your doctor so you know what might be done if you can't breathe on your own, your heart stops, or you're unable to swallow. · What things would you still want to be able to do after you receive life-support methods? Would you want to be able to walk? To speak? To eat on your own? To live without the help of machines? · If you have a choice, where do you want to be cared for? In your home? At a hospital or nursing home? · Do you want certain Yazidi practices performed if you become very ill? · If you have a choice at the end of your life, where would you prefer to die? At home? In a hospital or nursing home? Somewhere else? · Would you prefer to be buried or cremated? · Do you want your organs to be donated after you die? What should you do with your living will? · Make sure that your family members and your health care agent have copies of your living will. · Give your doctor a copy of your living will to keep in your medical record. If you have more than one doctor, make sure that each one has a copy. · You may want to put a copy of your living will where it can be easily found. Where can you learn more? Go to http://www.gray.com/. Enter L516 in the search box to learn more about \"Learning About Living [place]. \"  Current as of: August 8, 2016  Content Version: 11.3  © 9071-8770 [employer]. Care instructions adapted under license by [employer] (which disclaims liability or warranty for this information).  If you have questions about a medical condition or this instruction, always ask your healthcare professional. [place] [address] any warranty or liability for your use of this information.

## 2022-03-25 NOTE — ASSESSMENT & PLAN NOTE
Uncontrolled, has not been taking losartan regularly, she will start and let me know if there are any problems, will defer labs

## 2022-03-25 NOTE — ACP (ADVANCE CARE PLANNING)
Advance Care Planning   Advance Care Planning (ACP) Physician/NP/PA (Provider) Conversation    Date of ACP Conversation: 03/25/22  Persons included in Conversation:  patient  Length of ACP Conversation in minutes: <16 minutes (Non-Billable)    Authorized Decision Maker (if patient is incapable of making informed decisions):   Next of Kin by law (only applies in absence of a Healthcare Power of  or Legal Guardian)      Primary Decision Maker: Lenora Fatuma - Son - [phone]    Secondary Decision Maker: Aleah Khoa - Son - [phone]    She has NO advanced directive - recommended completing forms. Reviewed DNR/DNI and patient is not interested- elects Full Code (attempt resuscitation).        Consuelo Molina MD

## 2022-03-29 ENCOUNTER — TELEPHONE (OUTPATIENT)
Dept: INTERNAL MEDICINE CLINIC | Age: 67
End: 2022-03-29

## 2022-03-29 NOTE — TELEPHONE ENCOUNTER
Reason for call:  Requesting demographics, office notes and any testing on this pt before they can make appt, fax number [phone]    Is this a new problem: yes     Date of last appointment:  3/25/2022     Can we respond via simpleFLOORSt: no    Best call back number:    gastro intestinal specialist [phone]

## 2022-04-01 ENCOUNTER — TELEPHONE (OUTPATIENT)
Dept: INTERNAL MEDICINE CLINIC | Age: 67
End: 2022-04-01

## 2022-04-01 NOTE — TELEPHONE ENCOUNTER
Message has been given to patient per Dr. Jazzy Bullock. No further questions were asked.  Patient verbalized understanding

## 2022-04-01 NOTE — TELEPHONE ENCOUNTER
Keep taking medications daily and checking BP daily. Update me next week. These are not ideal so may need to adjust medications.

## 2022-04-01 NOTE — TELEPHONE ENCOUNTER
Reason for call:  Please call to discuss her b/p     Is this a new problem: yes     Date of last appointment:  3/25/2022     Can we respond via AudioCatch: no    Best call back number:  [phone]

## 2022-04-01 NOTE — TELEPHONE ENCOUNTER
----- Message from Ijeoma Ashlyn sent at 4/1/2022 12:08 PM EDT -----  Subject: Message to Provider    QUESTIONS  Information for Provider? patient was returning a call to the office and   you were gone to lunch. Her blood pressure reading was 145/84 left arm and   139/80 on right arm. please call patient when you return from lunch.  ---------------------------------------------------------------------------  --------------  [address]  What is the best way for the office to contact you? OK to leave message on   voicemail  Preferred Call Back Phone Number? [phone]  ---------------------------------------------------------------------------  --------------  SCRIPT ANSWERS  Relationship to Patient?  Self

## 2022-04-01 NOTE — TELEPHONE ENCOUNTER
----- Message from Matty Dora sent at 4/1/2022 12:08 PM EDT -----  Subject: Message to Provider    QUESTIONS  Information for Provider? patient was returning a call to the office and   you were gone to lunch. Her blood pressure reading was 145/84 left arm and   139/80 on right arm. please call patient when you return from lunch.  ---------------------------------------------------------------------------  --------------  [address]  What is the best way for the office to contact you? OK to leave message on   voicemail  Preferred Call Back Phone Number? [phone]  ---------------------------------------------------------------------------  --------------  SCRIPT ANSWERS  Relationship to Patient?  Self

## 2022-05-23 DIAGNOSIS — E03.4 HYPOTHYROIDISM DUE TO ACQUIRED ATROPHY OF THYROID: ICD-10-CM

## 2022-05-23 RX ORDER — LEVOTHYROXINE SODIUM 137 UG/1
TABLET ORAL
Qty: 77 TABLET | Refills: 1 | Status: SHIPPED | OUTPATIENT
Start: 2022-05-23 | End: 2022-10-14 | Stop reason: SDUPTHER

## 2022-05-24 NOTE — TELEPHONE ENCOUNTER
Future Appointments   Date Time Provider Farhana Cunha   9/30/2022  9:00 AM Marta Coppola MD [facility]

## 2022-05-25 ENCOUNTER — TELEPHONE (OUTPATIENT)
Dept: INTERNAL MEDICINE CLINIC | Age: 67
End: 2022-05-25

## 2022-05-25 NOTE — TELEPHONE ENCOUNTER
----- Message from Daphney Tai sent at 5/25/2022  9:34 AM EDT -----  Subject: Medication Problem    QUESTIONS  Name of Medication? levothyroxine (SYNTHROID) 137 mcg tablet  Patient-reported dosage and instructions? TAKE ONE TABLET BY MOUTH DAILY,   SKIP DOSE ON SUNDAYS  What question or problem do you have with the medication? Patient called   would like to know was medication sent to pharmacy   Preferred [facility]acy? [facility] [id number] - [place], [address] [facility] phone number (if available)? [phone]  Additional Information for Provider?   ---------------------------------------------------------------------------  --------------  CALL BACK INFO  What is the best way for the office to contact you? OK to leave message on   voicemail  Preferred Call Back Phone Number? [phone]  ---------------------------------------------------------------------------  --------------  SCRIPT ANSWERS  Relationship to Patient?  Self

## 2022-05-25 NOTE — TELEPHONE ENCOUNTER
----- Message from [employer] sent at 5/25/2022  9:36 AM EDT -----  Subject: Message to Provider    QUESTIONS  Information for Provider? Patient called would like to schedule Nurse   visit for BP check, would like if clinical staff could give her a call   back  ---------------------------------------------------------------------------  --------------  [address]  What is the best way for the office to contact you? OK to leave message on   voicemail  Preferred Call Back Phone Number?  [phone]  ---------------------------------------------------------------------------  --------------  SCRIPT ANSWERS  undefined

## 2022-06-29 ENCOUNTER — OFFICE VISIT (OUTPATIENT)
Dept: INTERNAL MEDICINE CLINIC | Age: 67
End: 2022-06-29
Payer: MEDICARE

## 2022-06-29 VITALS
RESPIRATION RATE: 16 BRPM | BODY MASS INDEX: 28.67 KG/M2 | TEMPERATURE: 97.8 F | OXYGEN SATURATION: 97 % | SYSTOLIC BLOOD PRESSURE: 130 MMHG | WEIGHT: 161.8 LBS | HEIGHT: 63 IN | DIASTOLIC BLOOD PRESSURE: 88 MMHG | HEART RATE: 80 BPM

## 2022-06-29 DIAGNOSIS — I10 HYPERTENSION, ESSENTIAL: ICD-10-CM

## 2022-06-29 DIAGNOSIS — R07.9 CHEST PAIN, UNSPECIFIED TYPE: Primary | ICD-10-CM

## 2022-06-29 DIAGNOSIS — E78.00 PURE HYPERCHOLESTEROLEMIA: ICD-10-CM

## 2022-06-29 DIAGNOSIS — E03.4 HYPOTHYROIDISM DUE TO ACQUIRED ATROPHY OF THYROID: ICD-10-CM

## 2022-06-29 PROCEDURE — G8427 DOCREV CUR MEDS BY ELIG CLIN: HCPCS | Performed by: NURSE PRACTITIONER

## 2022-06-29 PROCEDURE — 3017F COLORECTAL CA SCREEN DOC REV: CPT | Performed by: NURSE PRACTITIONER

## 2022-06-29 PROCEDURE — G8432 DEP SCR NOT DOC, RNG: HCPCS | Performed by: NURSE PRACTITIONER

## 2022-06-29 PROCEDURE — 1101F PT FALLS ASSESS-DOCD LE1/YR: CPT | Performed by: NURSE PRACTITIONER

## 2022-06-29 PROCEDURE — 1090F PRES/ABSN URINE INCON ASSESS: CPT | Performed by: NURSE PRACTITIONER

## 2022-06-29 PROCEDURE — 93000 ELECTROCARDIOGRAM COMPLETE: CPT | Performed by: NURSE PRACTITIONER

## 2022-06-29 PROCEDURE — G8399 PT W/DXA RESULTS DOCUMENT: HCPCS | Performed by: NURSE PRACTITIONER

## 2022-06-29 PROCEDURE — G8752 SYS BP LESS 140: HCPCS | Performed by: NURSE PRACTITIONER

## 2022-06-29 PROCEDURE — G8536 NO DOC ELDER MAL SCRN: HCPCS | Performed by: NURSE PRACTITIONER

## 2022-06-29 PROCEDURE — G8754 DIAS BP LESS 90: HCPCS | Performed by: NURSE PRACTITIONER

## 2022-06-29 PROCEDURE — G8417 CALC BMI ABV UP PARAM F/U: HCPCS | Performed by: NURSE PRACTITIONER

## 2022-06-29 PROCEDURE — 99213 OFFICE O/P EST LOW 20 MIN: CPT | Performed by: NURSE PRACTITIONER

## 2022-06-29 PROCEDURE — G9899 SCRN MAM PERF RSLTS DOC: HCPCS | Performed by: NURSE PRACTITIONER

## 2022-06-29 NOTE — PROGRESS NOTES
HISTORY OF PRESENT ILLNESS  Kristi Sandoval is a 77 y.o. female. Patient presents for hypertension, hyperlipidemia, and hypothyroidism follow-up. She also reports one episode last week of lower sternal/epigastric chest pain. It only lasted a few seconds. She reports a lot of increased stress lately related to her mother's poor health. No shortness of breath. No radiating pain. No pain today. She reports some fatigue, but relates this to stress. She is still working at [employer]. Visit Vitals  /88 (BP 1 Location: Left upper arm, BP Patient Position: Sitting, BP Cuff Size: Large adult)   Pulse 80 Comment: irreg   Temp 97.8 °F (36.6 °C) (Temporal)   Resp 16   Ht 5' 2.5\" (1.588 m)   Wt 161 lb 12.8 oz (73.4 kg)   LMP 01/01/2003   SpO2 97%   BMI 29.12 kg/m²       HPI    Review of Systems   Cardiovascular: Positive for chest pain. Negative for palpitations. Physical Exam  Constitutional:       Appearance: Normal appearance. Cardiovascular:      Rate and Rhythm: Normal rate and regular rhythm. Heart sounds: Normal heart sounds. Pulmonary:      Effort: Pulmonary effort is normal.      Breath sounds: Normal breath sounds. Musculoskeletal:         General: Normal range of motion. Skin:     General: Skin is warm and dry. Neurological:      General: No focal deficit present. Mental Status: She is alert and oriented to person, place, and time. Psychiatric:         Mood and Affect: Mood normal.         Behavior: Behavior normal.         ASSESSMENT and PLAN    ICD-10-CM ICD-9-CM    1. Chest pain, unspecified type  R07.9 786.50 AMB POC EKG ROUTINE W/ 12 LEADS, INTER & REP   2. Hypothyroidism due to acquired atrophy of thyroid  E03.4 244.8 TSH 3RD GENERATION     246.8    3. Hypertension, essential  I10 401.9 CBC WITH AUTOMATED DIFF      METABOLIC PANEL, COMPREHENSIVE   4.  Pure hypercholesterolemia  E78.00 272.0 LIPID PANEL     Orders Placed This Encounter    CBC WITH AUTOMATED DIFF    METABOLIC PANEL, COMPREHENSIVE    LIPID PANEL    TSH 3RD GENERATION    AMB POC EKG ROUTINE W/ 12 LEADS, INTER & REP   labs ordered  Follow-up in 4 months with PCP or sooner if further episodes occur

## 2022-06-30 ENCOUNTER — HOSPITAL ENCOUNTER (OUTPATIENT)
Dept: LAB | Age: 67
Discharge: HOME | End: 2022-06-30

## 2022-06-30 DIAGNOSIS — I10 HYPERTENSION, ESSENTIAL: ICD-10-CM

## 2022-06-30 DIAGNOSIS — E03.4 HYPOTHYROIDISM DUE TO ACQUIRED ATROPHY OF THYROID: ICD-10-CM

## 2022-06-30 DIAGNOSIS — E78.00 PURE HYPERCHOLESTEROLEMIA: ICD-10-CM

## 2022-07-01 LAB
ALBUMIN SERPL-MCNC: 4.4 G/DL (ref 3.8–4.8)
ALBUMIN/GLOB SERPL: 1.5 {RATIO} (ref 1.2–2.2)
ALP SERPL-CCNC: 120 IU/L (ref 44–121)
ALT SERPL-CCNC: 18 IU/L (ref 0–32)
AST SERPL-CCNC: 24 IU/L (ref 0–40)
BASOPHILS # BLD AUTO: 0.1 X10E3/UL (ref 0–0.2)
BASOPHILS NFR BLD AUTO: 1 %
BILIRUB SERPL-MCNC: 0.4 MG/DL (ref 0–1.2)
BUN SERPL-MCNC: 10 MG/DL (ref 8–27)
BUN/CREAT SERPL: 16 (ref 12–28)
CALCIUM SERPL-MCNC: 9.6 MG/DL (ref 8.7–10.3)
CHLORIDE SERPL-SCNC: 98 MMOL/L (ref 96–106)
CHOLEST SERPL-MCNC: 220 MG/DL (ref 100–199)
CO2 SERPL-SCNC: 22 MMOL/L (ref 20–29)
CREAT SERPL-MCNC: 0.62 MG/DL (ref 0.57–1)
EGFR: 98 ML/MIN/1.73
EOSINOPHIL # BLD AUTO: 0.2 X10E3/UL (ref 0–0.4)
EOSINOPHIL NFR BLD AUTO: 3 %
ERYTHROCYTE [DISTWIDTH] IN BLOOD BY AUTOMATED COUNT: 12.4 % (ref 11.7–15.4)
GLOBULIN SER CALC-MCNC: 2.9 G/DL (ref 1.5–4.5)
GLUCOSE SERPL-MCNC: 92 MG/DL (ref 65–99)
HCT VFR BLD AUTO: 39.6 % (ref 34–46.6)
HDLC SERPL-MCNC: 64 MG/DL
HGB BLD-MCNC: 13.7 G/DL (ref 11.1–15.9)
IMM GRANULOCYTES # BLD AUTO: 0 X10E3/UL (ref 0–0.1)
IMM GRANULOCYTES NFR BLD AUTO: 0 %
LDLC SERPL CALC-MCNC: 143 MG/DL (ref 0–99)
LYMPHOCYTES # BLD AUTO: 2.2 X10E3/UL (ref 0.7–3.1)
LYMPHOCYTES NFR BLD AUTO: 28 %
MCH RBC QN AUTO: 32.7 PG (ref 26.6–33)
MCHC RBC AUTO-ENTMCNC: 34.6 G/DL (ref 31.5–35.7)
MCV RBC AUTO: 95 FL (ref 79–97)
MONOCYTES # BLD AUTO: 0.7 X10E3/UL (ref 0.1–0.9)
MONOCYTES NFR BLD AUTO: 8 %
NEUTROPHILS # BLD AUTO: 4.7 X10E3/UL (ref 1.4–7)
NEUTROPHILS NFR BLD AUTO: 60 %
PLATELET # BLD AUTO: 366 X10E3/UL (ref 150–450)
POTASSIUM SERPL-SCNC: 4 MMOL/L (ref 3.5–5.2)
PROT SERPL-MCNC: 7.3 G/DL (ref 6–8.5)
RBC # BLD AUTO: 4.19 X10E6/UL (ref 3.77–5.28)
SODIUM SERPL-SCNC: 137 MMOL/L (ref 134–144)
TRIGL SERPL-MCNC: 72 MG/DL (ref 0–149)
VLDLC SERPL CALC-MCNC: 13 MG/DL (ref 5–40)
WBC # BLD AUTO: 7.9 X10E3/UL (ref 3.4–10.8)

## 2022-07-02 DIAGNOSIS — I10 HYPERTENSION, ESSENTIAL: ICD-10-CM

## 2022-07-02 RX ORDER — METOPROLOL TARTRATE 50 MG/1
TABLET ORAL
Qty: 180 TABLET | Refills: 1 | Status: SHIPPED | OUTPATIENT
Start: 2022-07-02 | End: 2022-10-14

## 2022-07-02 NOTE — PROGRESS NOTES
Results released to patient via [employer]. All labs are stable or at goal for her.   The 10-year ASCVD risk score (Catrina Trejo., et al., 2013) is: 8.4%

## 2022-07-06 LAB
SPECIMEN STATUS REPORT, ROLRST: NORMAL
TSH SERPL DL<=0.005 MIU/L-ACNC: 0.45 UIU/ML (ref 0.45–4.5)

## 2022-07-28 ENCOUNTER — PATIENT MESSAGE (OUTPATIENT)
Dept: INTERNAL MEDICINE CLINIC | Age: 67
End: 2022-07-28

## 2022-07-28 ENCOUNTER — TELEPHONE (OUTPATIENT)
Dept: INTERNAL MEDICINE CLINIC | Age: 67
End: 2022-07-28

## 2022-07-28 NOTE — TELEPHONE ENCOUNTER
----- Message from Silvia To sent at 7/27/2022  4:34 PM EDT -----  Subject: Results Request    QUESTIONS  Results: blood work; Ordered by:   Date Performed:   ---------------------------------------------------------------------------  --------------  Neftaly GUERRERO    [phone]; OK to leave message on voicemail  ---------------------------------------------------------------------------  --------------

## 2022-09-16 ENCOUNTER — TELEPHONE (OUTPATIENT)
Dept: INTERNAL MEDICINE CLINIC | Age: 67
End: 2022-09-16

## 2022-09-16 ENCOUNTER — OFFICE VISIT (OUTPATIENT)
Dept: INTERNAL MEDICINE CLINIC | Age: 67
End: 2022-09-16
Payer: MEDICARE

## 2022-09-16 VITALS
SYSTOLIC BLOOD PRESSURE: 140 MMHG | BODY MASS INDEX: 28.88 KG/M2 | DIASTOLIC BLOOD PRESSURE: 90 MMHG | WEIGHT: 163 LBS | HEART RATE: 62 BPM | TEMPERATURE: 97.5 F | OXYGEN SATURATION: 96 % | RESPIRATION RATE: 16 BRPM | HEIGHT: 63 IN

## 2022-09-16 DIAGNOSIS — M79.604 PAIN IN BOTH LOWER EXTREMITIES: ICD-10-CM

## 2022-09-16 DIAGNOSIS — M67.479 GANGLION CYST OF FOOT: Primary | ICD-10-CM

## 2022-09-16 DIAGNOSIS — M79.605 PAIN IN BOTH LOWER EXTREMITIES: ICD-10-CM

## 2022-09-16 PROCEDURE — G8755 DIAS BP > OR = 90: HCPCS | Performed by: INTERNAL MEDICINE

## 2022-09-16 PROCEDURE — G8536 NO DOC ELDER MAL SCRN: HCPCS | Performed by: INTERNAL MEDICINE

## 2022-09-16 PROCEDURE — G8753 SYS BP > OR = 140: HCPCS | Performed by: INTERNAL MEDICINE

## 2022-09-16 PROCEDURE — G8399 PT W/DXA RESULTS DOCUMENT: HCPCS | Performed by: INTERNAL MEDICINE

## 2022-09-16 PROCEDURE — G8417 CALC BMI ABV UP PARAM F/U: HCPCS | Performed by: INTERNAL MEDICINE

## 2022-09-16 PROCEDURE — 1090F PRES/ABSN URINE INCON ASSESS: CPT | Performed by: INTERNAL MEDICINE

## 2022-09-16 PROCEDURE — G8510 SCR DEP NEG, NO PLAN REQD: HCPCS | Performed by: INTERNAL MEDICINE

## 2022-09-16 PROCEDURE — 3017F COLORECTAL CA SCREEN DOC REV: CPT | Performed by: INTERNAL MEDICINE

## 2022-09-16 PROCEDURE — 99214 OFFICE O/P EST MOD 30 MIN: CPT | Performed by: INTERNAL MEDICINE

## 2022-09-16 PROCEDURE — 1101F PT FALLS ASSESS-DOCD LE1/YR: CPT | Performed by: INTERNAL MEDICINE

## 2022-09-16 PROCEDURE — G9899 SCRN MAM PERF RSLTS DOC: HCPCS | Performed by: INTERNAL MEDICINE

## 2022-09-16 PROCEDURE — G8427 DOCREV CUR MEDS BY ELIG CLIN: HCPCS | Performed by: INTERNAL MEDICINE

## 2022-09-16 NOTE — PROGRESS NOTES
Acute Care Note    Shaq Mc is 79 y.o. female. she presents for evaluation of Claudication (Circulation in leg problem)      The patient reports having been visited by a nurse representing her insurance company. At that time, they performed a test of her lower limb circulation. She was told during the visit that her testing was abnormal.  She presents today to discuss the ramifications of this test.  She has no complaints of claudication. We discussed that she can walk without difficulty and has no pain in her legs at night. She does report occasional pain in her legs. Not associated with activity. Of note, she has a firm nodule to the lateral portion of her left foot. We discussed that she would benefit from having this evaluated by podiatry. Prior to Admission medications    Medication Sig Start Date End Date Taking? Authorizing Provider   metoprolol tartrate (LOPRESSOR) 50 mg tablet TAKE ONE TABLET BY MOUTH TWICE A DAY 7/2/22  Yes Lydia Rincon MD   levothyroxine (SYNTHROID) 137 mcg tablet TAKE ONE TABLET BY MOUTH DAILY, SKIP DOSE ON SUNDAYS 5/23/22  Yes Lydia Rincon MD   ezetimibe (ZETIA) 10 mg tablet Take 1 Tablet by mouth daily. 3/25/22  Yes Lydia Rincon MD   losartan-hydroCHLOROthiazide [facility]) 100-12.5 mg per tablet Take 1 Tablet by mouth daily. 11/29/21  Yes Lydia Rincon MD   calcium carbonate/vitamin D3 (CALCIUM 600 + D,3, PO) Take  by mouth every other day. Yes Provider, Historical   diclofenac (VOLTAREN) 1 % gel Apply 2 g to affected area daily. R knee   Yes Provider, Historical   trolamine salicylate (ASPERCREME EX) by Apply Externally route every other day. Yes Provider, Historical   amLODIPine (NORVASC) 5 mg tablet Take 5 mg by mouth daily. Yes Provider, Historical   cyanocobalamin 1,000 mcg tablet Take 1,000 mcg by mouth every other day.    Yes Provider, Historical   acetaminophen (TYLENOL) 325 mg tablet Take  by mouth every four (4) hours as needed for Pain. Yes Provider, Historical   aspirin 81 mg tablet Take 1 Tab by mouth daily. 11/18/11  Yes Sohan Tsang MD   loratadine (CLARITIN) 10 mg tablet Take 1 Tablet by mouth daily. 9/10/21   Sohan Tsang MD   multivitamin (ONE A DAY) tablet Take 1 Tab by mouth daily. 5/31/11   Provider, Historical         Patient Active Problem List   Diagnosis Code    Hypertension, essential I10    Hypothyroid E03.9    Microscopic hematuria R31.29    GERD (gastroesophageal reflux disease) K21.9    Atrial tachycardia (HCC) I47.1    Abnormal mammogram of right breast R92.8    Palpitation R00.2    Osteopenia of left hip M85.852    Pure hypercholesterolemia E78.00         Review of Systems   Constitutional: Negative. Respiratory: Negative. Cardiovascular: Negative. Gastrointestinal: Negative. Visit Vitals  BP (!) 140/90 (BP 1 Location: Left arm, BP Patient Position: Sitting, BP Cuff Size: Adult)   Pulse 62   Temp 97.5 °F (36.4 °C) (Temporal)   Resp 16   Ht 5' 2.5\" (1.588 m)   Wt 163 lb (73.9 kg)   LMP 01/01/2003   SpO2 96%   BMI 29.34 kg/m²       Physical Exam  Constitutional:       Appearance: Normal appearance. Cardiovascular:      Rate and Rhythm: Normal rate and regular rhythm. Pulses: Normal pulses. Heart sounds: Normal heart sounds. Musculoskeletal:      Comments: Firm appearing nodule at the lateral portion of the left foot. Non-tender to palpation. No overlying redness. Neurological:      Mental Status: She is alert. ASSESSMENT/PLAN  Diagnoses and all orders for this visit:    1. Ganglion cyst of foot  -     REFERRAL TO PODIATRY    2. Pain in both lower extremities  -     ANKLE BRACHIAL INDEX; Future       Advised the patient to call back or return to office if symptoms worsen/change/persist.   Discussed expected course/resolution/complications of diagnosis in detail with patient.      Medication risks/benefits/costs/interactions/alternatives discussed with patient. The patient was given an after visit summary which includes diagnoses, current medications, & vitals. They expressed understanding with the diagnosis and plan.

## 2022-09-16 NOTE — TELEPHONE ENCOUNTER
Reason for call:   Pt would like an order for a colonoscopy     Is this a new problem: yes     Date of last appointment:  9/16/2022     Can we respond via GoChongot: no    Best call back number:  [phone]

## 2022-09-16 NOTE — PROGRESS NOTES
Chief Complaint   Patient presents with    Claudication     Circulation in leg problem     Reviewed record in preparation for visit and have obtained necessary documentation. Identified pt with two pt identifiers(name and ).       Health Maintenance Due   Topic    Shingrix Vaccine Age 50> (1 of 2)    DTaP/Tdap/Td series (2 - Td or Tdap)    Pneumococcal 65+ years (2 - PCV)    COVID-19 Vaccine (4 - Booster for Dyson Peter series)    Colorectal Cancer Screening Combo     Flu Vaccine (1)         Chief Complaint   Patient presents with    Claudication     Circulation in leg problem        Wt Readings from Last 3 Encounters:   22 163 lb (73.9 kg)   22 161 lb 12.8 oz (73.4 kg)   22 165 lb 12.8 oz (75.2 kg)     Temp Readings from Last 3 Encounters:   22 97.5 °F (36.4 °C) (Temporal)   22 97.8 °F (36.6 °C) (Temporal)   22 98 °F (36.7 °C) (Tympanic)     BP Readings from Last 3 Encounters:   22 (!) 140/90   22 130/88   22 (!) 156/86     Pulse Readings from Last 3 Encounters:   22 62   22 80   22 74           Learning Assessment:  :     Learning Assessment 2014   PRIMARY LEARNER Patient   HIGHEST LEVEL OF EDUCATION - PRIMARY LEARNER  GRADUATED HIGH SCHOOL OR GED   BARRIERS PRIMARY LEARNER NONE   CO-LEARNER CAREGIVER Yes   CO-LEARNER NAME Kemal Beck [address] LEVEL OF EDUCATION SOME COLLEGE   BARRIERS CO-LEARNER NONE   PRIMARY LANGUAGE ENGLISH   PRIMARY LANGUAGE CO-LEARNER ENGLISH    NEED No   LEARNER PREFERENCE PRIMARY LISTENING   LEARNER PREFERENCE CO-LEARNER OTHER (COMMENT)   ANSWERED BY Patient   RELATIONSHIP SELF       Depression Screening:  :     3 most recent PHQ Screens 2022   Little interest or pleasure in doing things Not at all   Feeling down, depressed, irritable, or hopeless Not at all   Total Score PHQ 2 0   Trouble falling or staying asleep, or sleeping too much -   Feeling tired or having little energy -   Poor appetite, weight loss, or overeating -   Feeling bad about yourself - or that you are a failure or have let yourself or your family down -   Trouble concentrating on things such as school, work, reading, or watching TV -   Moving or speaking so slowly that other people could have noticed; or the opposite being so fidgety that others notice -   How difficult have these problems made it for you to do your work, take care of your home and get along with others -       Fall Risk Assessment:  :     Fall Risk Assessment, last 12 mths 9/16/2022   Able to walk? Yes   Fall in past 12 months? 0   Do you feel unsteady? 0   Are you worried about falling 0       Abuse Screening:  :     Abuse Screening Questionnaire 9/16/2022 3/25/2022 3/5/2021 4/24/2020 7/27/2018   Do you ever feel afraid of your partner? N N N N N   Are you in a relationship with someone who physically or mentally threatens you? N N N N Y   Is it safe for you to go home? Y Y Y Y Y       Coordination of Care Questionnaire:  :     1) Have you been to an emergency room, urgent care clinic since your last visit? no   Hospitalized since your last visit? no             2) Have you seen or consulted any other health care providers outside of [address] [place] [address] since your last visit? no  (Include any pap smears or colon screenings in this section.)    3) Do you have an Advance Directive on file? no    4) Are you interested in receiving information on Advance Directives? NO      Patient is accompanied by self I have received verbal consent from Deyanira Rey to discuss any/all medical information while they are present in the room. Reviewed record  In preparation for visit and have obtained necessary documentation.

## 2022-10-08 DIAGNOSIS — I10 HYPERTENSION, ESSENTIAL: ICD-10-CM

## 2022-10-09 RX ORDER — LOSARTAN POTASSIUM AND HYDROCHLOROTHIAZIDE 12.5; 1 MG/1; MG/1
TABLET ORAL
Qty: 90 TABLET | Refills: 0 | Status: SHIPPED | OUTPATIENT
Start: 2022-10-09

## 2022-10-10 ENCOUNTER — TELEPHONE (OUTPATIENT)
Dept: INTERNAL MEDICINE CLINIC | Age: 67
End: 2022-10-10

## 2022-10-10 NOTE — TELEPHONE ENCOUNTER
Please call patient. I know she was just seen for an acute visit but is overdue for follow up visit. BP has been high 3 of last 4 visits, so needs BP med adjustment. Please increase metoprolol to 1.5 tabs BID until f/u.

## 2022-10-10 NOTE — TELEPHONE ENCOUNTER
Reason for call:    Patient called, returning Sonia's phone call.   (Scheduled follow up appt for 10/14/22)    Is this a new problem: yes     Date of last appointment:  9/16/2022     Can we respond via BlenderHouse: no    Best call back number:     Nadine Fan - [phone]

## 2022-10-11 ENCOUNTER — HOSPITAL ENCOUNTER (OUTPATIENT)
Dept: VASCULAR SURGERY | Age: 67
Discharge: HOME | End: 2022-10-11
Attending: INTERNAL MEDICINE
Payer: MEDICARE

## 2022-10-11 DIAGNOSIS — M79.605 PAIN IN BOTH LOWER EXTREMITIES: ICD-10-CM

## 2022-10-11 DIAGNOSIS — M79.604 PAIN IN BOTH LOWER EXTREMITIES: ICD-10-CM

## 2022-10-11 PROCEDURE — 93922 UPR/L XTREMITY ART 2 LEVELS: CPT

## 2022-10-13 LAB
LEFT ABI: 1.07
LEFT ARM BP: 150 MMHG
LEFT POSTERIOR TIBIAL: 160 MMHG
RIGHT ABI: 1.14
RIGHT ARM BP: 148 MMHG
RIGHT POSTERIOR TIBIAL: 171 MMHG
VAS LEFT DORSALIS PEDIS BP: 161 MMHG
VAS RIGHT DORSALIS PEDIS BP: 165 MMHG

## 2022-10-14 ENCOUNTER — OFFICE VISIT (OUTPATIENT)
Dept: INTERNAL MEDICINE CLINIC | Age: 67
End: 2022-10-14
Payer: MEDICARE

## 2022-10-14 VITALS
DIASTOLIC BLOOD PRESSURE: 97 MMHG | TEMPERATURE: 97.9 F | SYSTOLIC BLOOD PRESSURE: 168 MMHG | OXYGEN SATURATION: 99 % | HEART RATE: 64 BPM | WEIGHT: 166 LBS | RESPIRATION RATE: 18 BRPM | BODY MASS INDEX: 29.41 KG/M2 | HEIGHT: 63 IN

## 2022-10-14 DIAGNOSIS — E78.00 PURE HYPERCHOLESTEROLEMIA: ICD-10-CM

## 2022-10-14 DIAGNOSIS — I10 HYPERTENSION, ESSENTIAL: ICD-10-CM

## 2022-10-14 DIAGNOSIS — I10 WHITE COAT SYNDROME WITH DIAGNOSIS OF HYPERTENSION: Primary | ICD-10-CM

## 2022-10-14 DIAGNOSIS — E03.4 HYPOTHYROIDISM DUE TO ACQUIRED ATROPHY OF THYROID: ICD-10-CM

## 2022-10-14 DIAGNOSIS — I10 WHITE COAT SYNDROME WITH DIAGNOSIS OF HYPERTENSION: ICD-10-CM

## 2022-10-14 DIAGNOSIS — Z12.31 ENCOUNTER FOR SCREENING MAMMOGRAM FOR MALIGNANT NEOPLASM OF BREAST: ICD-10-CM

## 2022-10-14 DIAGNOSIS — E78.00 PURE HYPERCHOLESTEROLEMIA: Primary | ICD-10-CM

## 2022-10-14 DIAGNOSIS — Z12.11 COLON CANCER SCREENING: ICD-10-CM

## 2022-10-14 PROBLEM — H25.10 NUCLEAR SENILE CATARACT: Status: ACTIVE | Noted: 2022-10-14

## 2022-10-14 LAB
ALBUMIN SERPL-MCNC: 3.8 G/DL (ref 3.5–5)
ALBUMIN/GLOB SERPL: 1 {RATIO} (ref 1.1–2.2)
ALP SERPL-CCNC: 142 U/L (ref 45–117)
ALT SERPL-CCNC: 39 U/L (ref 12–78)
ANION GAP SERPL CALC-SCNC: 1 MMOL/L (ref 5–15)
AST SERPL-CCNC: 22 U/L (ref 15–37)
BILIRUB SERPL-MCNC: 0.3 MG/DL (ref 0.2–1)
BUN SERPL-MCNC: 9 MG/DL (ref 6–20)
BUN/CREAT SERPL: 14 (ref 12–20)
CALCIUM SERPL-MCNC: 9.1 MG/DL (ref 8.5–10.1)
CHLORIDE SERPL-SCNC: 104 MMOL/L (ref 97–108)
CHOLEST SERPL-MCNC: 200 MG/DL
CO2 SERPL-SCNC: 31 MMOL/L (ref 21–32)
CREAT SERPL-MCNC: 0.65 MG/DL (ref 0.55–1.02)
GLOBULIN SER CALC-MCNC: 3.9 G/DL (ref 2–4)
GLUCOSE SERPL-MCNC: 101 MG/DL (ref 65–100)
HDLC SERPL-MCNC: 64 MG/DL
HDLC SERPL: 3.1 {RATIO} (ref 0–5)
LDLC SERPL CALC-MCNC: 102.6 MG/DL (ref 0–100)
POTASSIUM SERPL-SCNC: 3.8 MMOL/L (ref 3.5–5.1)
PROT SERPL-MCNC: 7.7 G/DL (ref 6.4–8.2)
SODIUM SERPL-SCNC: 136 MMOL/L (ref 136–145)
TRIGL SERPL-MCNC: 167 MG/DL (ref ?–150)
VLDLC SERPL CALC-MCNC: 33.4 MG/DL

## 2022-10-14 PROCEDURE — G8753 SYS BP > OR = 140: HCPCS | Performed by: INTERNAL MEDICINE

## 2022-10-14 PROCEDURE — G8399 PT W/DXA RESULTS DOCUMENT: HCPCS | Performed by: INTERNAL MEDICINE

## 2022-10-14 PROCEDURE — 3017F COLORECTAL CA SCREEN DOC REV: CPT | Performed by: INTERNAL MEDICINE

## 2022-10-14 PROCEDURE — G8427 DOCREV CUR MEDS BY ELIG CLIN: HCPCS | Performed by: INTERNAL MEDICINE

## 2022-10-14 PROCEDURE — G8510 SCR DEP NEG, NO PLAN REQD: HCPCS | Performed by: INTERNAL MEDICINE

## 2022-10-14 PROCEDURE — G8417 CALC BMI ABV UP PARAM F/U: HCPCS | Performed by: INTERNAL MEDICINE

## 2022-10-14 PROCEDURE — 1101F PT FALLS ASSESS-DOCD LE1/YR: CPT | Performed by: INTERNAL MEDICINE

## 2022-10-14 PROCEDURE — G8536 NO DOC ELDER MAL SCRN: HCPCS | Performed by: INTERNAL MEDICINE

## 2022-10-14 PROCEDURE — G9899 SCRN MAM PERF RSLTS DOC: HCPCS | Performed by: INTERNAL MEDICINE

## 2022-10-14 PROCEDURE — 1090F PRES/ABSN URINE INCON ASSESS: CPT | Performed by: INTERNAL MEDICINE

## 2022-10-14 PROCEDURE — G8755 DIAS BP > OR = 90: HCPCS | Performed by: INTERNAL MEDICINE

## 2022-10-14 PROCEDURE — 99214 OFFICE O/P EST MOD 30 MIN: CPT | Performed by: INTERNAL MEDICINE

## 2022-10-14 RX ORDER — AMLODIPINE BESYLATE 5 MG/1
5 TABLET ORAL DAILY
Qty: 90 TABLET | Refills: 1 | Status: SHIPPED | OUTPATIENT
Start: 2022-10-14

## 2022-10-14 RX ORDER — UREA 10 %
100 LOTION (ML) TOPICAL DAILY
COMMUNITY

## 2022-10-14 RX ORDER — METOPROLOL TARTRATE 50 MG/1
75 TABLET ORAL 2 TIMES DAILY
Qty: 3 TABLET | Refills: 0
Start: 2022-10-14

## 2022-10-14 RX ORDER — LEVOTHYROXINE SODIUM 137 UG/1
TABLET ORAL
Qty: 77 TABLET | Refills: 1 | Status: SHIPPED | OUTPATIENT
Start: 2022-10-14

## 2022-10-14 NOTE — PROGRESS NOTES
Jeri Rodriguez is a 79 y.o. female who was seen in clinic today (10/14/2022). Assessment & Plan:   Below is the assessment and plan developed based on review of pertinent history, physical exam, labs, studies, and medications. 1. Pure hypercholesterolemia  Assessment & Plan:  Previously above goal, due for labs since starting medications   Orders:  -     LIPID PANEL; Future  -     METABOLIC PANEL, COMPREHENSIVE; Future  2. Hypothyroidism due to acquired atrophy of thyroid  Assessment & Plan:  at goal, continue current treatment    3. White coat syndrome with diagnosis of hypertension  Assessment & Plan:  Well controlled at home, no changes to medications pending review of labs   Orders:  -     metoprolol tartrate (LOPRESSOR) 50 mg tablet; Take 1.5 Tablets by mouth two (2) times a day.  , No Print, Disp-3 Tablet, R-0  -     METABOLIC PANEL, COMPREHENSIVE; Future  4. Encounter for screening mammogram for malignant neoplasm of breast  -     [facility] 3D KATHLEEN W MAMMO BI SCREENING INCL CAD; Future  5. Colon cancer screening  Comments:  overdue, put on hold b/c her mother was visiting, she will now call to schedule      Follow-up and Dispositions    Return in about 6 months (around 4/14/2023) for FULL PHYSICAL. Subjective/Objective:   Kristi was seen today for Hypertension      Since last visit: her mother was in town visiting. Cardiovascular Review  The patient has hypertension and hyperlipidemia. She is taking metoprolol 1.5 tab twice/day. She reports taking medications as instructed, no medication side effects noted, home BP monitoring in range of 232'P systolic over 38-76'F diastolic. She generally follows a low fat low cholesterol diet, generally follows a low sodium diet, exercises regularly. Endocrine Review  Patient is seen for followup of hypothyroidism. She reports medication compliance: all the time and is taking separate from all other meds.   She reports the following concerns/problems/med side effects: none. Prior to Admission medications    Medication Sig Start Date End Date Taking? Authorizing Provider   cyanocobalamin (Vitamin B-12) 100 mcg tablet Take 100 mcg by mouth daily. Yes Provider, Historical   losartan-hydroCHLOROthiazide (HYZAAR) 100-12.5 mg per tablet TAKE ONE TABLET BY MOUTH DAILY 10/9/22  Yes Ronnie Porras MD   metoprolol tartrate (LOPRESSOR) 50 mg tablet TAKE ONE TABLET BY MOUTH TWICE A DAY 7/2/22  Yes Ronnie Porras MD   levothyroxine (SYNTHROID) 137 mcg tablet TAKE ONE TABLET BY MOUTH DAILY, SKIP DOSE ON SUNDAYS 5/23/22  Yes Ronnie Porras MD   ezetimibe (ZETIA) 10 mg tablet Take 1 Tablet by mouth daily. 3/25/22  Yes Ronnie Porras MD   loratadine (CLARITIN) 10 mg tablet Take 1 Tablet by mouth daily. 9/10/21  Yes Ronnie Porras MD   calcium carbonate/vitamin D3 (CALCIUM 600 + D,3, PO) Take  by mouth every other day. Yes Provider, Historical   diclofenac (VOLTAREN) 1 % gel Apply 2 g to affected area daily. R knee   Yes Provider, Historical   trolamine salicylate (ASPERCREME EX) by Apply Externally route every other day. Yes Provider, Historical   amLODIPine (NORVASC) 5 mg tablet Take 5 mg by mouth daily. Yes Provider, Historical   cyanocobalamin 1,000 mcg tablet Take 1,000 mcg by mouth every other day. Yes Provider, Historical   acetaminophen (TYLENOL) 325 mg tablet Take  by mouth every four (4) hours as needed for Pain. Yes Provider, Historical   aspirin 81 mg tablet Take 1 Tab by mouth daily. 11/18/11  Yes Ronnie Porras MD   multivitamin (ONE A DAY) tablet Take 1 Tab by mouth daily. 5/31/11  Yes Provider, Historical        Review of Systems   Constitutional:  Negative for malaise/fatigue and weight loss. Respiratory:  Negative for cough and shortness of breath. Cardiovascular:  Negative for chest pain, palpitations and leg swelling.    Gastrointestinal:  Negative for abdominal pain, constipation, diarrhea, heartburn, nausea and vomiting. Musculoskeletal:  Negative for joint pain and myalgias. Skin:  Negative for rash. Neurological:  Negative for dizziness and headaches. Psychiatric/Behavioral:  Negative for depression. The patient is not nervous/anxious and does not have insomnia. Physical Exam  Constitutional:       General: She is not in acute distress. Eyes:      Conjunctiva/sclera: Conjunctivae normal.   Neck:      Thyroid: No thyromegaly. Cardiovascular:      Rate and Rhythm: Regular rhythm. Heart sounds: No murmur heard. Pulmonary:      Effort: Pulmonary effort is normal.      Breath sounds: Normal breath sounds. No decreased breath sounds or wheezing. Abdominal:      General: Bowel sounds are normal.      Palpations: Abdomen is soft. There is no hepatomegaly or splenomegaly. Tenderness: no abdominal tenderness   Musculoskeletal:      Right lower leg: No edema. Left lower leg: No edema.         Feet:    Psychiatric:         Mood and Affect: Mood and affect normal.         Behavior: Behavior normal.     Visit Vitals  BP (!) 168/97   Pulse 64   Temp 97.9 °F (36.6 °C) (Temporal)   Resp 18   Ht 5' 2.5\" (1.588 m)   Wt 166 lb (75.3 kg)   LMP 01/01/2003   SpO2 99%   BMI 29.88 kg/m²       Gaylan Spatz, MD well developed

## 2022-10-17 NOTE — PROGRESS NOTES
Results released to patient via [employer]. All labs are stable or at goal for her. BS is non-fasting. Lipids are improved.

## 2022-10-24 ENCOUNTER — TELEPHONE (OUTPATIENT)
Dept: INTERNAL MEDICINE CLINIC | Age: 67
End: 2022-10-24

## 2022-10-24 NOTE — TELEPHONE ENCOUNTER
Jimi Galvez [facility]  Subject: Message to Provider     QUESTIONS   Information for Provider? pt has a cold and would like medication to treat   it. Please contact the pt. Also has a cough   ---------------------------------------------------------------------------   --------------   Jennifer ALVAREZ   [phone]; OK to leave message on voicemail   ---------------------------------------------------------------------------   --------------   SCRIPT ANSWERS   Relationship to Patient?

## 2022-10-25 ENCOUNTER — TELEPHONE (OUTPATIENT)
Dept: INTERNAL MEDICINE CLINIC | Age: 67
End: 2022-10-25

## 2022-10-26 ENCOUNTER — TELEPHONE (OUTPATIENT)
Dept: INTERNAL MEDICINE CLINIC | Age: 67
End: 2022-10-26

## 2022-10-26 NOTE — TELEPHONE ENCOUNTER
Patient report after visit with Dr. iPlar Tarango developed a cough, post nasal drip. She denies any fever or sore throat. No other sx. She has tried Mucinex and Claritin. Will forward to MD. Please advise.

## 2022-10-26 NOTE — TELEPHONE ENCOUNTER
Should take a COVID test.  Otherwise OTC cold medications (Corcidin HBP).   Likely viral but if no improving then will need evaluation in office or at urgent care

## 2022-10-26 NOTE — TELEPHONE ENCOUNTER
Reason for call:  patient spoke with Andre Delgado and wanted to let her know that she has been taken mucinex DM and claritin can Dr Yesenia Goss order something else these are not working    Is this a new problem: yes     Date of last appointment:  10/14/2022     Can we respond via [employer]: no    Best call back number:    Jp Mcclain (Self) [phone] (Home)

## 2022-10-27 NOTE — TELEPHONE ENCOUNTER
[phone]    Pt would like something called in for her cough.  She said she took 2 Covid test and she is negative

## 2022-11-15 ENCOUNTER — OFFICE VISIT (OUTPATIENT)
Dept: INTERNAL MEDICINE CLINIC | Age: 67
End: 2022-11-15
Payer: MEDICARE

## 2022-11-15 VITALS
HEIGHT: 63 IN | RESPIRATION RATE: 18 BRPM | WEIGHT: 167 LBS | DIASTOLIC BLOOD PRESSURE: 86 MMHG | BODY MASS INDEX: 29.59 KG/M2 | HEART RATE: 65 BPM | SYSTOLIC BLOOD PRESSURE: 136 MMHG | OXYGEN SATURATION: 98 % | TEMPERATURE: 98.2 F

## 2022-11-15 DIAGNOSIS — J06.9 VIRAL UPPER RESPIRATORY TRACT INFECTION: Primary | ICD-10-CM

## 2022-11-15 PROCEDURE — 3017F COLORECTAL CA SCREEN DOC REV: CPT | Performed by: INTERNAL MEDICINE

## 2022-11-15 PROCEDURE — 99212 OFFICE O/P EST SF 10 MIN: CPT | Performed by: INTERNAL MEDICINE

## 2022-11-15 PROCEDURE — G8399 PT W/DXA RESULTS DOCUMENT: HCPCS | Performed by: INTERNAL MEDICINE

## 2022-11-15 PROCEDURE — G8510 SCR DEP NEG, NO PLAN REQD: HCPCS | Performed by: INTERNAL MEDICINE

## 2022-11-15 PROCEDURE — G9899 SCRN MAM PERF RSLTS DOC: HCPCS | Performed by: INTERNAL MEDICINE

## 2022-11-15 PROCEDURE — G8417 CALC BMI ABV UP PARAM F/U: HCPCS | Performed by: INTERNAL MEDICINE

## 2022-11-15 PROCEDURE — G8754 DIAS BP LESS 90: HCPCS | Performed by: INTERNAL MEDICINE

## 2022-11-15 PROCEDURE — 1090F PRES/ABSN URINE INCON ASSESS: CPT | Performed by: INTERNAL MEDICINE

## 2022-11-15 PROCEDURE — G8427 DOCREV CUR MEDS BY ELIG CLIN: HCPCS | Performed by: INTERNAL MEDICINE

## 2022-11-15 PROCEDURE — 1101F PT FALLS ASSESS-DOCD LE1/YR: CPT | Performed by: INTERNAL MEDICINE

## 2022-11-15 PROCEDURE — G8536 NO DOC ELDER MAL SCRN: HCPCS | Performed by: INTERNAL MEDICINE

## 2022-11-15 PROCEDURE — G8752 SYS BP LESS 140: HCPCS | Performed by: INTERNAL MEDICINE

## 2022-11-15 NOTE — PROGRESS NOTES
Mitali Carpenter is a 79 y.o. female who was seen in clinic today (11/15/2022) for an acute visit. Assessment & Plan:   Below is the assessment and plan developed based on review of pertinent history, physical exam, labs, studies, and medications. 1. Viral upper respiratory tract infection  Comments:  improving and almost resolved, continue current medications, no indication for antibiotics or steroids. Red flags & expectations reviewed    Follow-up and Dispositions    Return if symptoms worsen or fail to improve. Subjective/Objective:   Kristi was seen today for Follow-up    Initially had sinus congestion, rhinorrhea, post nasal drip, sore throat, and a slightly productive cough. This has mostly resolved but still having some lingering cough and congestion in the upper chest/lower neck area. She feels like her phlegm she can't get up. Mucinex, Clorcidin, Claritin, and cough drops. Prior to Admission medications    Medication Sig Start Date End Date Taking? Authorizing Provider   cyanocobalamin (VITAMIN B12) 100 mcg tablet Take 100 mcg by mouth daily. Yes Provider, Historical   metoprolol tartrate (LOPRESSOR) 50 mg tablet Take 1.5 Tablets by mouth two (2) times a day. 10/14/22  Yes Mateo Gore MD   levothyroxine (SYNTHROID) 137 mcg tablet TAKE ONE TABLET BY MOUTH DAILY, SKIP DOSE ON SUNDAYS 10/14/22  Yes Mateo Gore MD   amLODIPine (NORVASC) 5 mg tablet Take 1 Tablet by mouth daily. 10/14/22  Yes Mateo Gore MD   losartan-hydroCHLOROthiazide [facility]) 100-12.5 mg per tablet TAKE ONE TABLET BY MOUTH DAILY 10/9/22  Yes Mateo Gore MD   ezetimibe (ZETIA) 10 mg tablet Take 1 Tablet by mouth daily. 3/25/22  Yes Mateo Gore MD   loratadine (CLARITIN) 10 mg tablet Take 1 Tablet by mouth daily. 9/10/21  Yes Mateo Gore MD   calcium carbonate/vitamin D3 (CALCIUM 600 + D,3, PO) Take  by mouth every other day.    Yes Provider, Historical diclofenac (VOLTAREN) 1 % gel Apply 2 g to affected area daily. R knee   Yes Provider, Historical   trolamine salicylate (ASPERCREME EX) by Apply Externally route every other day. Yes Provider, Historical   cyanocobalamin 1,000 mcg tablet Take 1,000 mcg by mouth every other day. Yes Provider, Historical   acetaminophen (TYLENOL) 325 mg tablet Take  by mouth every four (4) hours as needed for Pain. Yes Provider, Historical   aspirin 81 mg tablet Take 1 Tab by mouth daily. 11/18/11  Yes Kimberlyn Patino MD   multivitamin (ONE A DAY) tablet Take 1 Tab by mouth daily. 5/31/11  Yes Provider, Historical           Physical Exam  HENT:      Right Ear: Ear canal normal. No middle ear effusion. Tympanic membrane is not erythematous. Left Ear: Ear canal normal.  No middle ear effusion. Tympanic membrane is not erythematous. Nose: No congestion or rhinorrhea. Right Turbinates: Not swollen. Left Turbinates: Not swollen. Right Sinus: No maxillary sinus tenderness or frontal sinus tenderness. Left Sinus: No maxillary sinus tenderness or frontal sinus tenderness. Mouth/Throat:      Mouth: Mucous membranes are moist.      Pharynx: No oropharyngeal exudate or posterior oropharyngeal erythema. Cardiovascular:      Rate and Rhythm: Regular rhythm. Heart sounds: No murmur heard. Pulmonary:      Effort: Pulmonary effort is normal.      Breath sounds: No decreased breath sounds or wheezing. Lymphadenopathy:      Cervical: No cervical adenopathy. Visit Vitals  /86   Pulse 65   Temp 98.2 °F (36.8 °C) (Temporal)   Resp 18   Ht 5' 2.5\" (1.588 m)   Wt 167 lb (75.8 kg)   LMP 01/01/2003   SpO2 98%   BMI 30.06 kg/m²         Advised her to call back or return to office if symptoms worsen/change/persist.  Discussed expected course/resolution/complications of diagnosis in detail with patient.   Medication risks/benefits/costs/interactions/alternatives discussed with patient.     Calin Villela MD

## 2022-11-30 DIAGNOSIS — I10 WHITE COAT SYNDROME WITH DIAGNOSIS OF HYPERTENSION: ICD-10-CM

## 2022-12-01 RX ORDER — METOPROLOL TARTRATE 50 MG/1
75 TABLET ORAL 2 TIMES DAILY
Qty: 270 TABLET | Refills: 1 | Status: SHIPPED | OUTPATIENT
Start: 2022-12-01

## 2022-12-02 DIAGNOSIS — I10 WHITE COAT SYNDROME WITH DIAGNOSIS OF HYPERTENSION: ICD-10-CM

## 2022-12-02 RX ORDER — METOPROLOL TARTRATE 50 MG/1
75 TABLET ORAL 2 TIMES DAILY
Qty: 270 TABLET | Refills: 1 | OUTPATIENT
Start: 2022-12-02

## 2022-12-02 NOTE — TELEPHONE ENCOUNTER
Requested Prescriptions     Pending Prescriptions Disp Refills    metoprolol tartrate (LOPRESSOR) 50 mg tablet 270 Tablet 1     Sig: Take 1.5 Tablets by mouth two (2) times a day.          Chiki Dk [id number] - Darrian Escalera, [address] [facility]  [phone]

## 2022-12-16 ENCOUNTER — PATIENT MESSAGE (OUTPATIENT)
Dept: INTERNAL MEDICINE CLINIC | Age: 67
End: 2022-12-16

## 2023-01-09 DIAGNOSIS — E03.4 HYPOTHYROIDISM DUE TO ACQUIRED ATROPHY OF THYROID: ICD-10-CM

## 2023-01-09 RX ORDER — LEVOTHYROXINE SODIUM 137 UG/1
TABLET ORAL
Qty: 77 TABLET | Refills: 1 | OUTPATIENT
Start: 2023-01-09

## 2023-01-09 NOTE — TELEPHONE ENCOUNTER
Requested Prescriptions     Pending Prescriptions Disp Refills    levothyroxine (SYNTHROID) 137 mcg tablet 77 Tablet 1     Sig: TAKE ONE TABLET BY MOUTH DAILY, SKIP DOSE ON [facility] [id number] - [address] [facility], [address] [place] [address]  [phone]

## 2023-02-02 ENCOUNTER — TELEPHONE (OUTPATIENT)
Dept: INTERNAL MEDICINE CLINIC | Age: 68
End: 2023-02-02

## 2023-02-02 DIAGNOSIS — I10 HYPERTENSION, ESSENTIAL: ICD-10-CM

## 2023-02-02 RX ORDER — LOSARTAN POTASSIUM AND HYDROCHLOROTHIAZIDE 12.5; 1 MG/1; MG/1
1 TABLET ORAL DAILY
Qty: 90 TABLET | Refills: 0 | Status: SHIPPED | OUTPATIENT
Start: 2023-02-02

## 2023-02-02 NOTE — TELEPHONE ENCOUNTER
Requested Prescriptions     Pending Prescriptions Disp Refills    losartan-hydroCHLOROthiazide (HYZAAR) 100-12.5 mg per tablet 90 Tablet 0     Sig: Take 1 Tablet by mouth daily.      Itz Malik [id number] [address]  [phone]

## 2023-02-24 DIAGNOSIS — E03.4 HYPOTHYROIDISM DUE TO ACQUIRED ATROPHY OF THYROID: ICD-10-CM

## 2023-02-24 DIAGNOSIS — I10 WHITE COAT SYNDROME WITH DIAGNOSIS OF HYPERTENSION: ICD-10-CM

## 2023-02-24 RX ORDER — METOPROLOL TARTRATE 50 MG/1
75 TABLET ORAL 2 TIMES DAILY
Qty: 270 TABLET | Refills: 1 | OUTPATIENT
Start: 2023-02-24

## 2023-02-24 RX ORDER — LEVOTHYROXINE SODIUM 137 UG/1
TABLET ORAL
Qty: 77 TABLET | Refills: 1 | OUTPATIENT
Start: 2023-02-24

## 2023-02-24 NOTE — TELEPHONE ENCOUNTER
Requested Prescriptions     Pending Prescriptions Disp Refills    levothyroxine (SYNTHROID) 137 mcg tablet 77 Tablet 1     Sig: TAKE ONE TABLET BY MOUTH DAILY, SKIP DOSE ON SUNDAYS    metoprolol tartrate (LOPRESSOR) 50 mg tablet 270 Tablet 1     Sig: Take 1.5 Tablets by mouth two (2) times a day.      Sneha Nevarez [id number] - Mk Clark, [address] [facility]  [phone]

## 2023-03-02 ENCOUNTER — HOSPITAL ENCOUNTER (OUTPATIENT)
Dept: MAMMOGRAPHY | Age: 68
Discharge: HOME | End: 2023-03-02
Attending: INTERNAL MEDICINE
Payer: MEDICARE

## 2023-03-02 DIAGNOSIS — Z12.31 ENCOUNTER FOR SCREENING MAMMOGRAM FOR MALIGNANT NEOPLASM OF BREAST: ICD-10-CM

## 2023-03-02 PROCEDURE — 77063 BREAST TOMOSYNTHESIS BI: CPT

## 2023-03-23 DIAGNOSIS — E03.4 HYPOTHYROIDISM DUE TO ACQUIRED ATROPHY OF THYROID: ICD-10-CM

## 2023-03-23 DIAGNOSIS — E78.00 PURE HYPERCHOLESTEROLEMIA: ICD-10-CM

## 2023-03-23 NOTE — TELEPHONE ENCOUNTER
Requested Prescriptions     Pending Prescriptions Disp Refills    levothyroxine (SYNTHROID) 137 mcg tablet 77 Tablet 1     Sig: TAKE ONE TABLET BY MOUTH DAILY, SKIP DOSE ON SUNDAYS    ezetimibe (ZETIA) 10 mg tablet 90 Tablet 1     Sig: Take 1 Tablet by mouth daily.      Vitaly Nieto [id number] - Roger Cordova, [address] [facility]  [phone]

## 2023-03-24 RX ORDER — LEVOTHYROXINE SODIUM 137 UG/1
TABLET ORAL
Qty: 77 TABLET | Refills: 0 | Status: SHIPPED | OUTPATIENT
Start: 2023-03-24

## 2023-03-24 RX ORDER — EZETIMIBE 10 MG/1
10 TABLET ORAL DAILY
Qty: 90 TABLET | Refills: 0 | Status: SHIPPED | OUTPATIENT
Start: 2023-03-24

## 2023-03-25 NOTE — TELEPHONE ENCOUNTER
Future Appointments   Date Time Provider Farhana Cunha   4/17/2023  2:40 PM Kaylynn Inman MD [facility] KRISTAL KIM AMB

## 2023-04-17 ENCOUNTER — OFFICE VISIT (OUTPATIENT)
Dept: INTERNAL MEDICINE CLINIC | Age: 68
End: 2023-04-17
Payer: MEDICAID

## 2023-04-17 VITALS
HEIGHT: 63 IN | DIASTOLIC BLOOD PRESSURE: 86 MMHG | RESPIRATION RATE: 18 BRPM | TEMPERATURE: 98.3 F | WEIGHT: 166 LBS | BODY MASS INDEX: 29.41 KG/M2 | SYSTOLIC BLOOD PRESSURE: 129 MMHG | HEART RATE: 74 BPM | OXYGEN SATURATION: 98 %

## 2023-04-17 DIAGNOSIS — E03.4 HYPOTHYROIDISM DUE TO ACQUIRED ATROPHY OF THYROID: Primary | ICD-10-CM

## 2023-04-17 DIAGNOSIS — E78.00 PURE HYPERCHOLESTEROLEMIA: ICD-10-CM

## 2023-04-17 DIAGNOSIS — H35.30 MACULAR DEGENERATION OF LEFT EYE, UNSPECIFIED TYPE: ICD-10-CM

## 2023-04-17 DIAGNOSIS — E03.4 HYPOTHYROIDISM DUE TO ACQUIRED ATROPHY OF THYROID: ICD-10-CM

## 2023-04-17 DIAGNOSIS — I10 WHITE COAT SYNDROME WITH DIAGNOSIS OF HYPERTENSION: ICD-10-CM

## 2023-04-17 PROCEDURE — 3017F COLORECTAL CA SCREEN DOC REV: CPT | Performed by: INTERNAL MEDICINE

## 2023-04-17 PROCEDURE — 99214 OFFICE O/P EST MOD 30 MIN: CPT | Performed by: INTERNAL MEDICINE

## 2023-04-17 PROCEDURE — G8427 DOCREV CUR MEDS BY ELIG CLIN: HCPCS | Performed by: INTERNAL MEDICINE

## 2023-04-17 PROCEDURE — 1101F PT FALLS ASSESS-DOCD LE1/YR: CPT | Performed by: INTERNAL MEDICINE

## 2023-04-17 PROCEDURE — G8399 PT W/DXA RESULTS DOCUMENT: HCPCS | Performed by: INTERNAL MEDICINE

## 2023-04-17 PROCEDURE — 1090F PRES/ABSN URINE INCON ASSESS: CPT | Performed by: INTERNAL MEDICINE

## 2023-04-17 PROCEDURE — G8510 SCR DEP NEG, NO PLAN REQD: HCPCS | Performed by: INTERNAL MEDICINE

## 2023-04-17 PROCEDURE — G8536 NO DOC ELDER MAL SCRN: HCPCS | Performed by: INTERNAL MEDICINE

## 2023-04-17 PROCEDURE — G8417 CALC BMI ABV UP PARAM F/U: HCPCS | Performed by: INTERNAL MEDICINE

## 2023-04-17 PROCEDURE — G9899 SCRN MAM PERF RSLTS DOC: HCPCS | Performed by: INTERNAL MEDICINE

## 2023-04-17 NOTE — ASSESSMENT & PLAN NOTE
New dx, reassured vitamins are the treatment, monitored by specialist. No acute findings meriting change in the plan

## 2023-04-17 NOTE — PROGRESS NOTES
Baljit Pittman is a 79 y.o. female who was seen in clinic today (4/17/2023). Assessment & Plan:   Below is the assessment and plan developed based on review of pertinent history, physical exam, labs, studies, and medications. 1. Hypothyroidism due to acquired atrophy of thyroid  Assessment & Plan:  well controlled, continue current treatment pending review of labs    Orders:  -     TSH 3RD GENERATION; Future  2. White coat syndrome with diagnosis of hypertension  Assessment & Plan:  well controlled, continue current treatment pending review of labs    Orders:  -     METABOLIC PANEL, COMPREHENSIVE; Future  3. Pure hypercholesterolemia  Assessment & Plan:  at goal, continue current treatment pending review of labs    Orders:  -     METABOLIC PANEL, COMPREHENSIVE; Future  4. Macular degeneration of left eye, unspecified type  Assessment & Plan:  New dx, reassured vitamins are the treatment, monitored by specialist. No acute findings meriting change in the plan    Follow-up and Dispositions    Return in about 6 months (around 10/17/2023) for FULL PHYSICAL. Subjective/Objective:   Kristi was seen today for Hypertension    Since last visit: new dx of L sided macular degeneration. She is considering moving to [place] to live with her sister. She also had FIT testing done thru insurance last month. Cardiovascular Review  The patient has hypertension and hyperlipidemia. She reports taking medications as instructed, no medication side effects noted, home BP monitoring in range of 749'C systolic over 04-42'F diastolic. She generally follows a low fat low cholesterol diet, generally follows a low sodium diet, exercises regularly. Endocrine Review  Patient is seen for followup of hypothyroidism. She reports medication compliance: all the time and is taking separate from all other meds. She reports the following concerns/problems/med side effects: none.         Prior to Admission medications Medication Sig Start Date End Date Taking? Authorizing Provider   levothyroxine (SYNTHROID) 137 mcg tablet TAKE ONE TABLET BY MOUTH DAILY, SKIP DOSE ON SUNDAYS 3/24/23  Yes Danae Grullon MD   ezetimibe (ZETIA) 10 mg tablet Take 1 Tablet by mouth daily. 3/24/23  Yes Danae Grullon MD   losartan-hydroCHLOROthiazide [facility]) 100-12.5 mg per tablet Take 1 Tablet by mouth daily. 2/2/23  Yes Danae Grullon MD   metoprolol tartrate (LOPRESSOR) 50 mg tablet Take 1.5 Tablets by mouth two (2) times a day. 12/1/22  Yes Danae Grullon MD   cyanocobalamin (VITAMIN B12) 100 mcg tablet Take 1 Tablet by mouth daily. Yes Provider, Historical   amLODIPine (NORVASC) 5 mg tablet Take 1 Tablet by mouth daily. 10/14/22  Yes Danae Grullon MD   loratadine (CLARITIN) 10 mg tablet Take 1 Tablet by mouth daily. 9/10/21  Yes Danae Grullon MD   calcium carbonate/vitamin D3 (CALCIUM 600 + D,3, PO) Take  by mouth every other day. Yes Provider, Historical   diclofenac (VOLTAREN) 1 % gel Apply 2 g to affected area daily. R knee   Yes Provider, Historical   trolamine salicylate (ASPERCREME EX) by Apply Externally route every other day. Yes Provider, Historical   cyanocobalamin 1,000 mcg tablet Take 1 Tablet by mouth every other day. Yes Provider, Historical   acetaminophen (TYLENOL) 325 mg tablet Take  by mouth every four (4) hours as needed for Pain. Yes Provider, Historical   aspirin 81 mg tablet Take 1 Tablet by mouth daily. 11/18/11  Yes Danae Grullon MD   multivitamin (ONE A DAY) tablet Take 1 Tablet by mouth daily. 5/31/11  Yes Provider, Historical        Review of Systems   Constitutional:  Negative for malaise/fatigue and weight loss. Respiratory:  Negative for cough and shortness of breath. Cardiovascular:  Negative for chest pain and palpitations. Gastrointestinal:  Negative for abdominal pain, constipation, diarrhea, nausea and vomiting.    Psychiatric/Behavioral: Negative for depression. The patient is not nervous/anxious and does not have insomnia. Physical Exam  Constitutional:       General: She is not in acute distress. Eyes:      Conjunctiva/sclera: Conjunctivae normal.   Neck:      Thyroid: No thyroid mass or thyroid tenderness. Cardiovascular:      Rate and Rhythm: Regular rhythm. Heart sounds: No murmur heard. Pulmonary:      Effort: Pulmonary effort is normal.      Breath sounds: Normal breath sounds. No decreased breath sounds or wheezing. Abdominal:      General: Bowel sounds are normal.      Palpations: Abdomen is soft. There is no hepatomegaly or splenomegaly. Tenderness: There is no abdominal tenderness. Musculoskeletal:      Right lower leg: No edema. Left lower leg: No edema. Lymphadenopathy:      Cervical: No cervical adenopathy.    Psychiatric:         Mood and Affect: Mood and affect normal.         Behavior: Behavior normal.     Visit Vitals  /86   Pulse 74   Temp 98.3 °F (36.8 °C) (Temporal)   Resp 18   Ht 5' 2.5\" (1.588 m)   Wt 166 lb (75.3 kg)   LMP 01/01/2003   SpO2 98%   BMI 29.88 kg/m²       Edie Castro MD

## 2023-04-18 LAB
ALBUMIN SERPL-MCNC: 3.9 G/DL (ref 3.5–5)
ALBUMIN/GLOB SERPL: 1.1 (ref 1.1–2.2)
ALP SERPL-CCNC: 119 U/L (ref 45–117)
ALT SERPL-CCNC: 37 U/L (ref 12–78)
ANION GAP SERPL CALC-SCNC: 4 MMOL/L (ref 5–15)
AST SERPL-CCNC: 28 U/L (ref 15–37)
BILIRUB SERPL-MCNC: 0.2 MG/DL (ref 0.2–1)
BUN SERPL-MCNC: 13 MG/DL (ref 6–20)
BUN/CREAT SERPL: 19 (ref 12–20)
CALCIUM SERPL-MCNC: 9.5 MG/DL (ref 8.5–10.1)
CHLORIDE SERPL-SCNC: 103 MMOL/L (ref 97–108)
CO2 SERPL-SCNC: 29 MMOL/L (ref 21–32)
CREAT SERPL-MCNC: 0.67 MG/DL (ref 0.55–1.02)
GLOBULIN SER CALC-MCNC: 3.5 G/DL (ref 2–4)
GLUCOSE SERPL-MCNC: 97 MG/DL (ref 65–100)
POTASSIUM SERPL-SCNC: 4.1 MMOL/L (ref 3.5–5.1)
PROT SERPL-MCNC: 7.4 G/DL (ref 6.4–8.2)
SODIUM SERPL-SCNC: 136 MMOL/L (ref 136–145)
TSH SERPL DL<=0.05 MIU/L-ACNC: 0.13 UIU/ML (ref 0.36–3.74)

## 2023-05-01 DIAGNOSIS — I10 HYPERTENSION, ESSENTIAL: ICD-10-CM

## 2023-05-01 RX ORDER — LOSARTAN POTASSIUM AND HYDROCHLOROTHIAZIDE 12.5; 1 MG/1; MG/1
TABLET ORAL
Qty: 90 TABLET | Refills: 1 | Status: SHIPPED | OUTPATIENT
Start: 2023-05-01
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
## 2023-05-02 NOTE — TELEPHONE ENCOUNTER
Future Appointments   Date Time Provider Farhana Cunha   10/16/2023  2:00 PM Sandra Keith MD [facility] KRISTAL KIM AMB

## 2023-05-29 RX ORDER — METOPROLOL TARTRATE 50 MG/1
TABLET, FILM COATED ORAL
Qty: 270 TABLET | Refills: 1 | Status: SHIPPED | OUTPATIENT
Start: 2023-05-29

## 2023-05-29 NOTE — TELEPHONE ENCOUNTER
Future Appointments   Date Time Provider Sharon Tipton   10/16/2023  2:00 PM Mary Jim MD [facility] DARIELA FRANCIS AMB

## 2023-05-31 RX ORDER — LEVOTHYROXINE SODIUM 137 UG/1
TABLET ORAL
Qty: 77 TABLET | Refills: 1 | Status: SHIPPED | OUTPATIENT
Start: 2023-05-31

## 2023-05-31 NOTE — TELEPHONE ENCOUNTER
Chief Complaint   Patient presents with    Medication Refill     Last Appointment with Dr. Servando Lowe:  4/17/23    Future Appointments   Date Time Provider Sharon Tipton   10/16/2023  2:00 PM MD BRIANNE Roberts

## 2023-06-22 DIAGNOSIS — E78.00 PURE HYPERCHOLESTEROLEMIA: Primary | ICD-10-CM

## 2023-06-22 RX ORDER — EZETIMIBE 10 MG/1
TABLET ORAL
Qty: 90 TABLET | Refills: 1 | Status: SHIPPED | OUTPATIENT
Start: 2023-06-22

## 2023-10-16 ENCOUNTER — OFFICE VISIT (OUTPATIENT)
Age: 68
End: 2023-10-16
Payer: MEDICAID

## 2023-10-16 VITALS
BODY MASS INDEX: 28.35 KG/M2 | RESPIRATION RATE: 16 BRPM | DIASTOLIC BLOOD PRESSURE: 86 MMHG | HEIGHT: 63 IN | HEART RATE: 61 BPM | SYSTOLIC BLOOD PRESSURE: 146 MMHG | WEIGHT: 160 LBS | TEMPERATURE: 97.4 F | OXYGEN SATURATION: 99 %

## 2023-10-16 DIAGNOSIS — I10 WHITE COAT SYNDROME WITH DIAGNOSIS OF HYPERTENSION: ICD-10-CM

## 2023-10-16 DIAGNOSIS — M85.852 OSTEOPENIA OF LEFT HIP: ICD-10-CM

## 2023-10-16 DIAGNOSIS — E78.00 PURE HYPERCHOLESTEROLEMIA: ICD-10-CM

## 2023-10-16 DIAGNOSIS — E03.4 HYPOTHYROIDISM DUE TO ACQUIRED ATROPHY OF THYROID: ICD-10-CM

## 2023-10-16 DIAGNOSIS — Z11.59 NEED FOR HEPATITIS C SCREENING TEST: ICD-10-CM

## 2023-10-16 DIAGNOSIS — Z71.89 ADVANCED CARE PLANNING/COUNSELING DISCUSSION: ICD-10-CM

## 2023-10-16 DIAGNOSIS — Z00.00 MEDICARE ANNUAL WELLNESS VISIT, SUBSEQUENT: Primary | ICD-10-CM

## 2023-10-16 DIAGNOSIS — Z23 ENCOUNTER FOR IMMUNIZATION: ICD-10-CM

## 2023-10-16 PROBLEM — H04.123 DRY EYE SYNDROME OF BOTH EYES: Status: ACTIVE | Noted: 2023-10-16

## 2023-10-16 PROCEDURE — G0439 PPPS, SUBSEQ VISIT: HCPCS | Performed by: INTERNAL MEDICINE

## 2023-10-16 PROCEDURE — 1123F ACP DISCUSS/DSCN MKR DOCD: CPT | Performed by: INTERNAL MEDICINE

## 2023-10-16 PROCEDURE — 3077F SYST BP >= 140 MM HG: CPT | Performed by: INTERNAL MEDICINE

## 2023-10-16 PROCEDURE — 3079F DIAST BP 80-89 MM HG: CPT | Performed by: INTERNAL MEDICINE

## 2023-10-16 RX ORDER — METOPROLOL TARTRATE 100 MG/1
TABLET ORAL
Qty: 180 TABLET | Refills: 1 | Status: SHIPPED | OUTPATIENT
Start: 2023-10-16

## 2023-10-16 SDOH — ECONOMIC STABILITY: FOOD INSECURITY: WITHIN THE PAST 12 MONTHS, THE FOOD YOU BOUGHT JUST DIDN'T LAST AND YOU DIDN'T HAVE MONEY TO GET MORE.: NEVER TRUE

## 2023-10-16 SDOH — ECONOMIC STABILITY: INCOME INSECURITY: HOW HARD IS IT FOR YOU TO PAY FOR THE VERY BASICS LIKE FOOD, HOUSING, MEDICAL CARE, AND HEATING?: NOT HARD AT ALL

## 2023-10-16 SDOH — ECONOMIC STABILITY: HOUSING INSECURITY
IN THE LAST 12 MONTHS, WAS THERE A TIME WHEN YOU DID NOT HAVE A STEADY PLACE TO SLEEP OR SLEPT IN A SHELTER (INCLUDING NOW)?: NO

## 2023-10-16 SDOH — ECONOMIC STABILITY: FOOD INSECURITY: WITHIN THE PAST 12 MONTHS, YOU WORRIED THAT YOUR FOOD WOULD RUN OUT BEFORE YOU GOT MONEY TO BUY MORE.: NEVER TRUE

## 2023-10-16 ASSESSMENT — ENCOUNTER SYMPTOMS
SHORTNESS OF BREATH: 0
ABDOMINAL PAIN: 0
BLOOD IN STOOL: 0
CONSTIPATION: 0
NAUSEA: 0
COUGH: 0
DIARRHEA: 0
VOMITING: 0

## 2023-10-16 ASSESSMENT — LIFESTYLE VARIABLES
HOW MANY STANDARD DRINKS CONTAINING ALCOHOL DO YOU HAVE ON A TYPICAL DAY: PATIENT DOES NOT DRINK
HOW OFTEN DO YOU HAVE A DRINK CONTAINING ALCOHOL: NEVER

## 2023-10-16 ASSESSMENT — PATIENT HEALTH QUESTIONNAIRE - PHQ9
SUM OF ALL RESPONSES TO PHQ9 QUESTIONS 1 & 2: 0
SUM OF ALL RESPONSES TO PHQ QUESTIONS 1-9: 0
SUM OF ALL RESPONSES TO PHQ QUESTIONS 1-9: 0
2. FEELING DOWN, DEPRESSED OR HOPELESS: 0
1. LITTLE INTEREST OR PLEASURE IN DOING THINGS: 0
SUM OF ALL RESPONSES TO PHQ QUESTIONS 1-9: 0
SUM OF ALL RESPONSES TO PHQ QUESTIONS 1-9: 0

## 2023-10-16 NOTE — ACP (ADVANCE CARE PLANNING)
Advance Care Planning   Advance Care Planning (ACP) Physician/NP/PA (Provider) Conversation    Date of ACP Conversation: 10/16/23  Persons included in Conversation:  patient  Length of ACP Conversation in minutes: <16 minutes (Non-Billable)    Has ACP document(s) NOT on file - requested patient to provide.   She elects Full Code (Attempt Resuscitation)    The patient has appointed the following active healthcare agents:    Primary Decision [facility] - [phone]    Secondary Decision Maker: Carmine Espinosa. - Child - [phone]     The Patient has the following current code status:    Code Status: Full Code    Gustavo Grayson MD

## 2023-10-16 NOTE — PROGRESS NOTES
Medicare Annual Wellness Visit    Mikayla Nathan is here for Medicare AWV    Assessment & Plan   Medicare annual wellness visit, subsequent  Advanced care planning/counseling discussion  -     FULL CODE  Encounter for immunization  -     pneumococcal 20-valent conjugat (PREVNAR) 0.5 ML LORNA inj; Inject 0.5 mLs into the muscle once for 1 dose, Disp-0.5 mL, R-0Print  -     Tdap (ADACEL) 5-2-15.5 LF-MCG/0.5 injection; Inject 0.5 mLs into the muscle once for 1 dose, Disp-0.5 mL, R-0Print  Osteopenia of left hip  Assessment & Plan:  Asymptomatic, previous DEXA reviewed, will defer this year and revisit next year. Continue w/ weight bearing exercises. Continue Ca+D  White coat syndrome with diagnosis of hypertension  Assessment & Plan:  Not at goal, reviewed options, will increase metoprolol. Reviewed expectations. Orders:  -     Comprehensive Metabolic Panel; Future  -     CBC; Future  -     metoprolol tartrate (LOPRESSOR) 100 MG tablet; TAKE 1 TABLET BY MOUTH TWO TIMES A DAY, Disp-180 tablet, R-1Normal  Pure hypercholesterolemia  Assessment & Plan:  at goal, continue current treatment pending review of labs  Orders:  -     Comprehensive Metabolic Panel; Future  -     Lipid Panel; Future  -     CBC; Future  Hypothyroidism due to acquired atrophy of thyroid  Assessment & Plan:  Asymptomatic, TSH was low at last visit, no changes pending review of labs   Orders:  -     TSH; Future  -     T4, Free; Future  Need for hepatitis C screening test  -     Hepatitis C Ab, Rflx to Qt by PCR; Future     Recommendations for Preventive Services Due: see orders and patient instructions/AVS.  Recommended screening schedule for the next 5-10 years is provided to the patient in written form: see Patient Instructions/AVS.     Return in about 2 months (around 12/16/2023) for Nurse Visit - BP Check, then 6 months with me if BP is better controlled. .       Subjective   Since last visit: weight has decreased.     See ACP Note for Advanced

## 2023-10-17 NOTE — ASSESSMENT & PLAN NOTE
Asymptomatic, previous DEXA reviewed, will defer this year and revisit next year. Continue w/ weight bearing exercises.   Continue Ca+D

## 2023-10-17 NOTE — PATIENT INSTRUCTIONS
Personalized Preventive Plan for Rebekah Dillon - 10/16/2023  Medicare offers a range of preventive health benefits. Some of the tests and screenings are paid in full while other may be subject to a deductible, co-insurance, and/or copay. Some of these benefits include a comprehensive review of your medical history including lifestyle, illnesses that may run in your family, and various assessments and screenings as appropriate. After reviewing your medical record and screening and assessments performed today your provider may have ordered immunizations, labs, imaging, and/or referrals for you. A list of these orders (if applicable) as well as your Preventive Care list are included within your After Visit Summary for your review. Other Preventive Recommendations:    A preventive eye exam performed by an eye specialist is recommended every 1-2 years to screen for glaucoma; cataracts, macular degeneration, and other eye disorders. A preventive dental visit is recommended every 6 months. Try to get at least 150 minutes of exercise per week or 10,000 steps per day on a pedometer . Order or download the FREE \"Exercise & Physical Activity: Your Everyday Guide\" from [employer]. Call 6-230.165.2969 or search [employer] online. You need 0111-3671 mg of calcium and 4609-8789 IU of vitamin D per day. It is possible to meet your calcium requirement with diet alone, but a vitamin D supplement is usually necessary to meet this goal.  When exposed to the sun, use a sunscreen that protects against both UVA and UVB radiation with an SPF of 30 or greater. Reapply every 2 to 3 hours or after sweating, drying off with a towel, or swimming. Always wear a seat belt when traveling in a car. Always wear a helmet when riding a bicycle or motorcycle.

## 2023-10-22 DIAGNOSIS — E03.4 HYPOTHYROIDISM DUE TO ACQUIRED ATROPHY OF THYROID: Primary | ICD-10-CM

## 2023-10-22 RX ORDER — LEVOTHYROXINE SODIUM 112 UG/1
TABLET ORAL
Qty: 90 TABLET | Refills: 0 | Status: SHIPPED | OUTPATIENT
Start: 2023-10-22

## 2023-10-29 RX ORDER — LOSARTAN POTASSIUM AND HYDROCHLOROTHIAZIDE 12.5; 1 MG/1; MG/1
1 TABLET ORAL DAILY
Qty: 90 TABLET | Refills: 1 | Status: SHIPPED | OUTPATIENT
Start: 2023-10-29

## 2023-12-01 ENCOUNTER — NURSE ONLY (OUTPATIENT)
Age: 68
End: 2023-12-01

## 2023-12-01 VITALS — DIASTOLIC BLOOD PRESSURE: 82 MMHG | HEART RATE: 50 BPM | SYSTOLIC BLOOD PRESSURE: 139 MMHG

## 2023-12-01 DIAGNOSIS — I10 WHITE COAT SYNDROME WITH DIAGNOSIS OF HYPERTENSION: Primary | ICD-10-CM

## 2023-12-01 DIAGNOSIS — E03.4 HYPOTHYROIDISM DUE TO ACQUIRED ATROPHY OF THYROID: ICD-10-CM

## 2023-12-01 LAB — TSH SERPL DL<=0.05 MIU/L-ACNC: 3.06 UIU/ML (ref 0.36–3.74)

## 2023-12-02 NOTE — PROGRESS NOTES
RTC for 6 wk NV and BP check. Improved. No medication changes made. Is in the upper limits of normal.  She is on 4 BP medications and she can't tolerate higher doses of current medications (higher doses amlodipine caused edema and HCTZ caused dizziness). Based on age I would not make any changes but she needs to know she would likely benefit from a another dose of medication but I will hold off for now. .        BP Readings from Last 5 Encounters:   12/01/23 139/82   10/16/23 (!) 146/86   04/17/23 129/86   11/15/22 136/86   10/14/22 (!) 168/97

## 2023-12-29 DIAGNOSIS — E78.00 PURE HYPERCHOLESTEROLEMIA: ICD-10-CM

## 2023-12-29 RX ORDER — EZETIMIBE 10 MG/1
10 TABLET ORAL DAILY
Qty: 90 TABLET | Refills: 1 | Status: SHIPPED | OUTPATIENT
Start: 2023-12-29

## 2023-12-30 DIAGNOSIS — E03.4 HYPOTHYROIDISM DUE TO ACQUIRED ATROPHY OF THYROID: ICD-10-CM

## 2024-01-02 RX ORDER — LEVOTHYROXINE SODIUM 112 UG/1
TABLET ORAL
Qty: 75 TABLET | Refills: 0 | Status: SHIPPED | OUTPATIENT
Start: 2024-01-02

## 2024-01-02 NOTE — TELEPHONE ENCOUNTER
Chief Complaint   Patient presents with    Medication Refill     Last Appointment with Dr. Ryan Christian: 10/16/23    Future Appointments   Date Time Provider Department Center   4/19/2024  8:00 AM [facility]

## 2024-01-19 ENCOUNTER — OFFICE VISIT (OUTPATIENT)
Age: 69
End: 2024-01-19
Payer: MEDICAID

## 2024-01-19 VITALS
BODY MASS INDEX: 29.7 KG/M2 | WEIGHT: 167.6 LBS | RESPIRATION RATE: 16 BRPM | HEART RATE: 66 BPM | HEIGHT: 63 IN | SYSTOLIC BLOOD PRESSURE: 150 MMHG | TEMPERATURE: 98 F | OXYGEN SATURATION: 98 % | DIASTOLIC BLOOD PRESSURE: 82 MMHG

## 2024-01-19 DIAGNOSIS — E03.4 HYPOTHYROIDISM DUE TO ACQUIRED ATROPHY OF THYROID: ICD-10-CM

## 2024-01-19 DIAGNOSIS — K21.9 GASTROESOPHAGEAL REFLUX DISEASE WITHOUT ESOPHAGITIS: Primary | ICD-10-CM

## 2024-01-19 LAB
T4 FREE SERPL-MCNC: 1.2 NG/DL (ref 0.8–1.5)
TSH SERPL DL<=0.05 MIU/L-ACNC: 1.48 UIU/ML (ref 0.36–3.74)

## 2024-01-19 PROCEDURE — 99213 OFFICE O/P EST LOW 20 MIN: CPT | Performed by: NURSE PRACTITIONER

## 2024-01-19 PROCEDURE — 3079F DIAST BP 80-89 MM HG: CPT | Performed by: NURSE PRACTITIONER

## 2024-01-19 PROCEDURE — 3077F SYST BP >= 140 MM HG: CPT | Performed by: NURSE PRACTITIONER

## 2024-01-19 PROCEDURE — 1123F ACP DISCUSS/DSCN MKR DOCD: CPT | Performed by: NURSE PRACTITIONER

## 2024-01-19 RX ORDER — PANTOPRAZOLE SODIUM 40 MG/1
40 TABLET, DELAYED RELEASE ORAL
Qty: 30 TABLET | Refills: 0 | Status: SHIPPED | OUTPATIENT
Start: 2024-01-19

## 2024-01-19 ASSESSMENT — PATIENT HEALTH QUESTIONNAIRE - PHQ9
1. LITTLE INTEREST OR PLEASURE IN DOING THINGS: 0
SUM OF ALL RESPONSES TO PHQ QUESTIONS 1-9: 0
SUM OF ALL RESPONSES TO PHQ QUESTIONS 1-9: 0
SUM OF ALL RESPONSES TO PHQ9 QUESTIONS 1 & 2: 0
SUM OF ALL RESPONSES TO PHQ QUESTIONS 1-9: 0
SUM OF ALL RESPONSES TO PHQ QUESTIONS 1-9: 0
2. FEELING DOWN, DEPRESSED OR HOPELESS: 0

## 2024-01-19 ASSESSMENT — ENCOUNTER SYMPTOMS: ABDOMINAL PAIN: 1

## 2024-01-19 NOTE — PROGRESS NOTES
Nelly Cm (:  1955) is a 68 y.o. female,here for evaluation of the following chief complaint(s):  Abdominal Pain    BP (!) 150/82   Pulse 66   Temp 98 °F (36.7 °C) (Temporal)   Resp 16   Ht 1.6 m (5' 3\")   Wt 76 kg (167 lb 9.6 oz)   SpO2 98%   BMI 29.69 kg/m²       SUBJECTIVE/OBJECTIVE:    HPI: Patient reports right upper quadrant abdominal pain starting 2 months ago, worse in the afternoons. It seems worse after eating. She also notices increased belching. Patient reports some palpitations recently so she wants her thyroid levels checked again. Home BP readings run around 130s/80s. No chest pain or shortness of breath.    Review of Systems   Gastrointestinal:  Positive for abdominal pain.       Physical Exam  Constitutional:       Appearance: Normal appearance.   Cardiovascular:      Rate and Rhythm: Normal rate and regular rhythm.   Pulmonary:      Effort: Pulmonary effort is normal.      Breath sounds: Normal breath sounds.   Abdominal:      General: Abdomen is flat. Bowel sounds are normal. There is no distension.      Palpations: Abdomen is soft.      Tenderness: There is no abdominal tenderness. There is no guarding or rebound.   Musculoskeletal:      Cervical back: Normal range of motion.   Skin:     General: Skin is warm and dry.   Neurological:      General: No focal deficit present.      Mental Status: She is alert and oriented to person, place, and time.   Psychiatric:         Mood and Affect: Mood normal.         Behavior: Behavior normal.          ASSESSMENT/PLAN:  1. Gastroesophageal reflux disease without esophagitis  -     pantoprazole (PROTONIX) 40 MG tablet; Take 1 tablet by mouth every morning (before breakfast), Disp-30 tablet, R-0Normal  2. Hypothyroidism due to acquired atrophy of thyroid  -     TSH + Free T4 Panel; Future    Will start protonix x 1 month  GERD diet precautions  Follow-up if no improvement  Labs ordered  --NIYA Patel - LANEY

## 2024-02-16 NOTE — PATIENT INSTRUCTIONS
Decrease amoxicillin to 1 with breakfast and 1 with supper.  2 only a day.   Insomnia: Care Instructions Your Care Instructions Insomnia is the inability to sleep well. It is a common problem for most people at some time. Insomnia may make it hard for you to get to sleep, stay asleep, or sleep as long as you need to. This can make you tired and grouchy during the day. It can also make you forgetful, less effective at work, and unhappy. Insomnia can be caused by conditions such as depression or anxiety. Pain can also affect your ability to sleep. When these problems are solved, the insomnia usually clears up. But sometimes bad sleep habits can cause insomnia. If insomnia is affecting your work or your enjoyment of life, you can take steps to improve your sleep. Follow-up care is a key part of your treatment and safety. Be sure to make and go to all appointments, and call your doctor if you are having problems. It's also a good idea to know your test results and keep a list of the medicines you take. How can you care for yourself at home? What to avoid · Do not have drinks with caffeine, such as coffee or black tea, for 8 hours before bed. · Do not smoke or use other types of tobacco near bedtime. Nicotine is a stimulant and can keep you awake. · Avoid drinking alcohol late in the evening, because it can cause you to wake in the middle of the night. · Do not eat a big meal close to bedtime. If you are hungry, eat a light snack. · Do not drink a lot of water close to bedtime, because the need to urinate may wake you up during the night. · Do not read or watch TV in bed. Use the bed only for sleeping and sexual activity. What to try · Go to bed at the same time every night, and wake up at the same time every morning. Do not take naps during the day. · Keep your bedroom quiet, dark, and cool. · Sleep on a comfortable pillow and mattress. · If watching the clock makes you anxious, turn it facing away from you so you cannot see the time. · If you worry when you lie down, start a worry book. Well before bedtime, write down your worries, and then set the book and your concerns aside. · Try meditation or other relaxation techniques before you go to bed. · If you cannot fall asleep, get up and go to another room until you feel sleepy. Do something relaxing. Repeat your bedtime routine before you go to bed again. · Make your house quiet and calm about an hour before bedtime. Turn down the lights, turn off the TV, log off the computer, and turn down the volume on music. This can help you relax after a busy day. When should you call for help? Watch closely for changes in your health, and be sure to contact your doctor if: 
  · Your efforts to improve your sleep do not work.  
  · Your insomnia gets worse.  
  · You have been feeling down, depressed, or hopeless or have lost interest in things that you usually enjoy. Where can you learn more? Go to http://www.gray.com/ Enter P513 in the search box to learn more about \"Insomnia: Care Instructions. \" Current as of: December 16, 2019               Content Version: 12.6 © 6980-6915 [employer]. Care instructions adapted under license by [employer] (which disclaims liability or warranty for this information). If you have questions about a medical condition or this instruction, always ask your healthcare professional. [address] any warranty or liability for your use of this information.

## 2024-02-19 ENCOUNTER — TELEPHONE (OUTPATIENT)
Age: 69
End: 2024-02-19

## 2024-02-19 DIAGNOSIS — K21.9 GASTROESOPHAGEAL REFLUX DISEASE WITHOUT ESOPHAGITIS: ICD-10-CM

## 2024-02-19 NOTE — TELEPHONE ENCOUNTER
Medication Refill Request    Nellyterrell Cm is requesting a refill of the following medication(s):   pantoprazole (PROTONIX) 40 MG tablet                   Please send refill to:     [facility] [id number] - [place], [place] - [address] [phone] - F [phone] [address] [address]  Phone: [phone] Fax: [phone]

## 2024-02-20 RX ORDER — PANTOPRAZOLE SODIUM 40 MG/1
40 TABLET, DELAYED RELEASE ORAL
Qty: 30 TABLET | Refills: 1 | Status: SHIPPED | OUTPATIENT
Start: 2024-02-20

## 2024-02-20 NOTE — TELEPHONE ENCOUNTER
Future Appointments   Date Time Provider Department Center   3/6/2024 12:00 PM [facility] 4 [facility]   4/19/2024  8:00 AM Ryan Christian MD [facility]

## 2024-03-06 ENCOUNTER — HOSPITAL ENCOUNTER (OUTPATIENT)
Facility: HOSPITAL | Age: 69
Discharge: HOME | End: 2024-03-09
Attending: INTERNAL MEDICINE
Payer: MEDICAID

## 2024-03-06 VITALS — BODY MASS INDEX: 29.59 KG/M2 | HEIGHT: 63 IN | WEIGHT: 167 LBS

## 2024-03-06 DIAGNOSIS — Z12.31 VISIT FOR SCREENING MAMMOGRAM: ICD-10-CM

## 2024-03-06 PROCEDURE — 77063 BREAST TOMOSYNTHESIS BI: CPT

## 2024-03-28 DIAGNOSIS — E03.4 HYPOTHYROIDISM DUE TO ACQUIRED ATROPHY OF THYROID: ICD-10-CM

## 2024-03-28 RX ORDER — LEVOTHYROXINE SODIUM 112 UG/1
TABLET ORAL
Qty: 75 TABLET | Refills: 0 | Status: SHIPPED | OUTPATIENT
Start: 2024-03-28

## 2024-03-28 NOTE — TELEPHONE ENCOUNTER
Chief Complaint   Patient presents with    Medication Refill     Last Appointment with Dr. Christian:  10/16/2023   Future Appointments   Date Time Provider Department Center   4/19/2024  8:00 AM Ryan Christian MD [facility]

## 2024-04-01 DIAGNOSIS — I10 WHITE COAT SYNDROME WITH DIAGNOSIS OF HYPERTENSION: ICD-10-CM

## 2024-04-01 RX ORDER — METOPROLOL TARTRATE 100 MG/1
TABLET ORAL
Qty: 180 TABLET | Refills: 1 | Status: SHIPPED | OUTPATIENT
Start: 2024-04-01

## 2024-04-23 ENCOUNTER — OFFICE VISIT (OUTPATIENT)
Age: 69
End: 2024-04-23
Payer: MEDICARE

## 2024-04-23 VITALS
TEMPERATURE: 97.4 F | WEIGHT: 169 LBS | DIASTOLIC BLOOD PRESSURE: 93 MMHG | RESPIRATION RATE: 16 BRPM | BODY MASS INDEX: 29.95 KG/M2 | HEIGHT: 63 IN | OXYGEN SATURATION: 99 % | HEART RATE: 59 BPM | SYSTOLIC BLOOD PRESSURE: 157 MMHG

## 2024-04-23 DIAGNOSIS — E78.00 PURE HYPERCHOLESTEROLEMIA: ICD-10-CM

## 2024-04-23 DIAGNOSIS — R30.0 DYSURIA: ICD-10-CM

## 2024-04-23 DIAGNOSIS — I10 WHITE COAT SYNDROME WITH DIAGNOSIS OF HYPERTENSION: Primary | ICD-10-CM

## 2024-04-23 DIAGNOSIS — E03.4 HYPOTHYROIDISM DUE TO ACQUIRED ATROPHY OF THYROID: ICD-10-CM

## 2024-04-23 DIAGNOSIS — R31.29 MICROSCOPIC HEMATURIA: ICD-10-CM

## 2024-04-23 DIAGNOSIS — Z12.11 COLON CANCER SCREENING: ICD-10-CM

## 2024-04-23 DIAGNOSIS — K21.9 GASTROESOPHAGEAL REFLUX DISEASE WITHOUT ESOPHAGITIS: ICD-10-CM

## 2024-04-23 PROBLEM — R92.8 ABNORMAL MAMMOGRAM OF RIGHT BREAST: Status: RESOLVED | Noted: 2017-09-19 | Resolved: 2024-04-23

## 2024-04-23 LAB
BILIRUBIN, URINE, POC: NEGATIVE
BLOOD URINE, POC: NORMAL
GLUCOSE URINE, POC: NEGATIVE
KETONES, URINE, POC: NEGATIVE
LEUKOCYTE ESTERASE, URINE, POC: NEGATIVE
NITRITE, URINE, POC: NEGATIVE
PH, URINE, POC: 6 (ref 4.6–8)
PROTEIN,URINE, POC: NEGATIVE
SPECIFIC GRAVITY, URINE, POC: 1 (ref 1–1.03)
URINALYSIS CLARITY, POC: CLEAR
URINALYSIS COLOR, POC: YELLOW
UROBILINOGEN, POC: NORMAL

## 2024-04-23 PROCEDURE — 1123F ACP DISCUSS/DSCN MKR DOCD: CPT | Performed by: INTERNAL MEDICINE

## 2024-04-23 PROCEDURE — G8427 DOCREV CUR MEDS BY ELIG CLIN: HCPCS | Performed by: INTERNAL MEDICINE

## 2024-04-23 PROCEDURE — 81001 URINALYSIS AUTO W/SCOPE: CPT | Performed by: INTERNAL MEDICINE

## 2024-04-23 PROCEDURE — PBSHW AMB POC URINALYSIS DIP STICK AUTO W/ MICRO: Performed by: INTERNAL MEDICINE

## 2024-04-23 PROCEDURE — 3075F SYST BP GE 130 - 139MM HG: CPT | Performed by: INTERNAL MEDICINE

## 2024-04-23 PROCEDURE — 1036F TOBACCO NON-USER: CPT | Performed by: INTERNAL MEDICINE

## 2024-04-23 PROCEDURE — G8399 PT W/DXA RESULTS DOCUMENT: HCPCS | Performed by: INTERNAL MEDICINE

## 2024-04-23 PROCEDURE — 3017F COLORECTAL CA SCREEN DOC REV: CPT | Performed by: INTERNAL MEDICINE

## 2024-04-23 PROCEDURE — 3079F DIAST BP 80-89 MM HG: CPT | Performed by: INTERNAL MEDICINE

## 2024-04-23 PROCEDURE — 99214 OFFICE O/P EST MOD 30 MIN: CPT | Performed by: INTERNAL MEDICINE

## 2024-04-23 PROCEDURE — 1090F PRES/ABSN URINE INCON ASSESS: CPT | Performed by: INTERNAL MEDICINE

## 2024-04-23 PROCEDURE — G8419 CALC BMI OUT NRM PARAM NOF/U: HCPCS | Performed by: INTERNAL MEDICINE

## 2024-04-23 RX ORDER — FAMOTIDINE 20 MG/1
20 TABLET, FILM COATED ORAL 2 TIMES DAILY
Qty: 180 TABLET | Refills: 3 | Status: SHIPPED | OUTPATIENT
Start: 2024-04-23

## 2024-04-23 ASSESSMENT — PATIENT HEALTH QUESTIONNAIRE - PHQ9
SUM OF ALL RESPONSES TO PHQ QUESTIONS 1-9: 0
SUM OF ALL RESPONSES TO PHQ QUESTIONS 1-9: 0
SUM OF ALL RESPONSES TO PHQ9 QUESTIONS 1 & 2: 0
SUM OF ALL RESPONSES TO PHQ QUESTIONS 1-9: 0
1. LITTLE INTEREST OR PLEASURE IN DOING THINGS: NOT AT ALL
2. FEELING DOWN, DEPRESSED OR HOPELESS: NOT AT ALL
SUM OF ALL RESPONSES TO PHQ QUESTIONS 1-9: 0

## 2024-04-23 ASSESSMENT — ENCOUNTER SYMPTOMS
COUGH: 0
DIARRHEA: 0
ABDOMINAL PAIN: 0
CONSTIPATION: 0
BLOOD IN STOOL: 0
SHORTNESS OF BREATH: 0
VOMITING: 0
NAUSEA: 0

## 2024-04-23 NOTE — ASSESSMENT & PLAN NOTE
Not ideally controlled, did well with PPI.  We reviewed medication options. She was on Famotidine in the past and will restart.  Expectations reviewed

## 2024-04-25 RX ORDER — LOSARTAN POTASSIUM AND HYDROCHLOROTHIAZIDE 12.5; 1 MG/1; MG/1
1 TABLET ORAL DAILY
Qty: 90 TABLET | Refills: 1 | Status: SHIPPED | OUTPATIENT
Start: 2024-04-25

## 2024-04-25 NOTE — TELEPHONE ENCOUNTER
Chief Complaint   Patient presents with    Medication Refill     Last Appointment with Dr. Ryan Christian:  4/23/24    Future Appointments   Date Time Provider Department Center   10/28/2024  1:00 PM [facility]

## 2024-05-08 LAB — NONINV COLON CA DNA+OCC BLD SCRN STL QL: NEGATIVE

## 2024-06-10 ENCOUNTER — TELEPHONE (OUTPATIENT)
Age: 69
End: 2024-06-10

## 2024-06-10 DIAGNOSIS — E03.4 HYPOTHYROIDISM DUE TO ACQUIRED ATROPHY OF THYROID: ICD-10-CM

## 2024-06-10 RX ORDER — LEVOTHYROXINE SODIUM 112 UG/1
TABLET ORAL
Qty: 75 TABLET | Refills: 0 | Status: SHIPPED | OUTPATIENT
Start: 2024-06-10

## 2024-06-10 NOTE — TELEPHONE ENCOUNTER
Medication Refill Request    Nellyterrell Cm is requesting a refill of the following medication(s):   levothyroxine (SYNTHROID) 112 MCG tablet                 Please send refill to:     [facility] [id number] - [place], [place] - [address] [phone] - F [phone] [address] [address]  Phone: [phone] Fax: [phone]

## 2024-07-01 ENCOUNTER — TELEPHONE (OUTPATIENT)
Age: 69
End: 2024-07-01

## 2024-07-01 DIAGNOSIS — E78.00 PURE HYPERCHOLESTEROLEMIA: ICD-10-CM

## 2024-07-01 RX ORDER — EZETIMIBE 10 MG/1
10 TABLET ORAL DAILY
Qty: 90 TABLET | Refills: 0 | Status: SHIPPED | OUTPATIENT
Start: 2024-07-01

## 2024-07-01 NOTE — TELEPHONE ENCOUNTER
Chief Complaint   Patient presents with    Medication Refill     Last Appointment with Dr. Ryan Christian:  4/23/24    Future Appointments   Date Time Provider Department Center   10/28/2024  1:00 PM [facility]

## 2024-07-01 NOTE — TELEPHONE ENCOUNTER
Medication Refill Request    Nelly Cm is requesting a refill of the following medication(s):   ezetimibe (ZETIA) 10 MG tablet               Please send refill to:     [facility] [id number] - [place], [place] - [address] [phone] - F [phone] [address] [address]  Phone: [phone] Fax: [phone]

## 2024-07-25 NOTE — TELEPHONE ENCOUNTER
Call to patient. Advised of Dr. Ana Nowak' note and recommendations. She is to start new medication, HCTZ 25 mg, RX sent to [facility] per VO of Dr. Ana Nowak. Advised to RTC in 6 weeks for NV for BP check. Patient verbalized understanding. What Type Of Note Output Would You Prefer (Optional)?: Bullet Format How Severe Are Your Spot(S)?: mild Have Your Spot(S) Been Treated In The Past?: has not been treated Hpi Title: Evaluation of Skin Lesions Additional History: UBE.

## 2024-08-23 ENCOUNTER — TELEPHONE (OUTPATIENT)
Age: 69
End: 2024-08-23

## 2024-08-26 ENCOUNTER — TELEPHONE (OUTPATIENT)
Age: 69
End: 2024-08-26

## 2024-08-26 NOTE — TELEPHONE ENCOUNTER
Reason for call:  pt is returning a call to edgardo regarding a letter for her son  please call back    Is this a new problem: Yes    Date of last appointment:  4/23/2024     Can we respond via [employer]: No    Best call back number:     Soila Nelly MERYL (Self) [phone] (Home)

## 2024-09-03 DIAGNOSIS — E03.4 HYPOTHYROIDISM DUE TO ACQUIRED ATROPHY OF THYROID: ICD-10-CM

## 2024-09-04 RX ORDER — LEVOTHYROXINE SODIUM 112 UG/1
TABLET ORAL
Qty: 75 TABLET | Refills: 0 | Status: SHIPPED | OUTPATIENT
Start: 2024-09-04

## 2024-09-04 NOTE — TELEPHONE ENCOUNTER
Chief Complaint   Patient presents with    Medication Refill     Last Appointment with Dr. Ryan Christian: 4/23/24    Future Appointments   Date Time Provider Department Center   10/28/2024  1:00 PM Ryan Christian MD [facility] DEP   [facility]

## 2024-09-23 ENCOUNTER — TELEPHONE (OUTPATIENT)
Dept: PHARMACY | Facility: CLINIC | Age: 69
End: 2024-09-23

## 2024-09-28 DIAGNOSIS — I10 WHITE COAT SYNDROME WITH DIAGNOSIS OF HYPERTENSION: ICD-10-CM

## 2024-09-29 RX ORDER — METOPROLOL TARTRATE 100 MG
TABLET ORAL
Qty: 180 TABLET | Refills: 1 | Status: SHIPPED | OUTPATIENT
Start: 2024-09-29

## 2024-09-30 ENCOUNTER — TELEPHONE (OUTPATIENT)
Age: 69
End: 2024-09-30

## 2024-09-30 DIAGNOSIS — E78.00 PURE HYPERCHOLESTEROLEMIA: ICD-10-CM

## 2024-09-30 RX ORDER — EZETIMIBE 10 MG/1
10 TABLET ORAL DAILY
Qty: 90 TABLET | Refills: 0 | Status: SHIPPED | OUTPATIENT
Start: 2024-09-30

## 2024-09-30 NOTE — TELEPHONE ENCOUNTER
Medication Refill Request    Nelly Cm is requesting a refill of the following medication(s):   ezetimibe (ZETIA) 10 MG tablet               Please send refill to:     [facility] [id number] - [place], [place] - [address] [phone] - F [phone] [address] [address]  Phone: [phone] Fax: [phone]

## 2024-10-28 ENCOUNTER — OFFICE VISIT (OUTPATIENT)
Age: 69
End: 2024-10-28
Payer: MEDICARE

## 2024-10-28 VITALS
BODY MASS INDEX: 31.22 KG/M2 | OXYGEN SATURATION: 98 % | WEIGHT: 176.2 LBS | RESPIRATION RATE: 16 BRPM | DIASTOLIC BLOOD PRESSURE: 90 MMHG | TEMPERATURE: 97.3 F | HEART RATE: 54 BPM | HEIGHT: 63 IN | SYSTOLIC BLOOD PRESSURE: 150 MMHG

## 2024-10-28 DIAGNOSIS — E78.00 PURE HYPERCHOLESTEROLEMIA: ICD-10-CM

## 2024-10-28 DIAGNOSIS — Z71.89 ADVANCED CARE PLANNING/COUNSELING DISCUSSION: ICD-10-CM

## 2024-10-28 DIAGNOSIS — M85.852 OSTEOPENIA OF LEFT HIP: ICD-10-CM

## 2024-10-28 DIAGNOSIS — E03.4 HYPOTHYROIDISM DUE TO ACQUIRED ATROPHY OF THYROID: ICD-10-CM

## 2024-10-28 DIAGNOSIS — I47.19 ATRIAL TACHYCARDIA (HCC): ICD-10-CM

## 2024-10-28 DIAGNOSIS — Z00.00 MEDICARE ANNUAL WELLNESS VISIT, SUBSEQUENT: Primary | ICD-10-CM

## 2024-10-28 DIAGNOSIS — I10 WHITE COAT SYNDROME WITH DIAGNOSIS OF HYPERTENSION: ICD-10-CM

## 2024-10-28 DIAGNOSIS — E66.811 OBESITY (BMI 30.0-34.9): ICD-10-CM

## 2024-10-28 PROCEDURE — 99214 OFFICE O/P EST MOD 30 MIN: CPT | Performed by: INTERNAL MEDICINE

## 2024-10-28 RX ORDER — AMLODIPINE BESYLATE 5 MG/1
5 TABLET ORAL DAILY
Qty: 90 TABLET | Refills: 1 | Status: SHIPPED | OUTPATIENT
Start: 2024-10-28

## 2024-10-28 RX ORDER — LOSARTAN POTASSIUM AND HYDROCHLOROTHIAZIDE 12.5; 5 MG/1; MG/1
1 TABLET ORAL DAILY
Qty: 90 TABLET | Refills: 1 | Status: SHIPPED | OUTPATIENT
Start: 2024-10-28

## 2024-10-28 SDOH — ECONOMIC STABILITY: INCOME INSECURITY: HOW HARD IS IT FOR YOU TO PAY FOR THE VERY BASICS LIKE FOOD, HOUSING, MEDICAL CARE, AND HEATING?: NOT HARD AT ALL

## 2024-10-28 SDOH — ECONOMIC STABILITY: FOOD INSECURITY: WITHIN THE PAST 12 MONTHS, THE FOOD YOU BOUGHT JUST DIDN'T LAST AND YOU DIDN'T HAVE MONEY TO GET MORE.: NEVER TRUE

## 2024-10-28 SDOH — ECONOMIC STABILITY: FOOD INSECURITY: WITHIN THE PAST 12 MONTHS, YOU WORRIED THAT YOUR FOOD WOULD RUN OUT BEFORE YOU GOT MONEY TO BUY MORE.: NEVER TRUE

## 2024-10-28 ASSESSMENT — PATIENT HEALTH QUESTIONNAIRE - PHQ9
SUM OF ALL RESPONSES TO PHQ QUESTIONS 1-9: 0
SUM OF ALL RESPONSES TO PHQ QUESTIONS 1-9: 0
2. FEELING DOWN, DEPRESSED OR HOPELESS: NOT AT ALL
SUM OF ALL RESPONSES TO PHQ9 QUESTIONS 1 & 2: 0
SUM OF ALL RESPONSES TO PHQ QUESTIONS 1-9: 0
1. LITTLE INTEREST OR PLEASURE IN DOING THINGS: NOT AT ALL
SUM OF ALL RESPONSES TO PHQ QUESTIONS 1-9: 0

## 2024-10-28 ASSESSMENT — ENCOUNTER SYMPTOMS
ABDOMINAL PAIN: 0
COUGH: 0
CONSTIPATION: 0
SHORTNESS OF BREATH: 0
BLOOD IN STOOL: 0
NAUSEA: 0
DIARRHEA: 0
VOMITING: 0

## 2024-10-28 NOTE — ASSESSMENT & PLAN NOTE
Asymptomatic, previous DEXA reviewed ('20), will defer this year and will plan on revisiting in '25.  Continue w/ weight bearing exercises.  Continue Ca+D

## 2024-10-28 NOTE — PROGRESS NOTES
Medicare Annual Wellness Visit    Nelly Cm is here for Medicare AWV, Medication Refill, and Weight Management    Assessment & Plan   Medicare annual wellness visit, subsequent  Advanced care planning/counseling discussion  Osteopenia of left hip  Assessment & Plan:  Asymptomatic, previous DEXA reviewed ('20), will defer this year and will plan on revisiting in '25.  Continue w/ weight bearing exercises.  Continue Ca+D  White coat syndrome with diagnosis of hypertension  -     Comprehensive Metabolic Panel; Future  -     CBC; Future  -     losartan-hydroCHLOROthiazide (HYZAAR) 50-12.5 MG per tablet; Take 1 tablet by mouth daily, Disp-90 tablet, R-1Normal  -     amLODIPine (NORVASC) 5 MG tablet; Take 1 tablet by mouth daily, Disp-90 tablet, R-1Normal  Pure hypercholesterolemia  Assessment & Plan:   Chronic, at goal (stable), continue current plan pending work up below  Orders:  -     Comprehensive Metabolic Panel; Future  -     Lipid Panel; Future  Hypothyroidism due to acquired atrophy of thyroid  Assessment & Plan:  Previously at goal (stable), no changes pending review of labs, reviewed this could be contributing to her weight gain but unclear at this time.  She will need medication refilled  Orders:  -     TSH; Future  -     T4, Free; Future  Atrial tachycardia (HCC)  Assessment & Plan:  Asymptomatic.  Will monitor.  Continue beta blocker.   Obesity (BMI 30.0-34.9)  Assessment & Plan:  worsening, above goal, and needs improvement. I have recommended the following interventions: dietary management education, guidance, and counseling and encourage exercise.       Recommendations for Preventive Services Due: see orders and patient instructions/AVS.  Recommended screening schedule for the next 5-10 years is provided to the patient in written form: see Patient Instructions/AVS.     Return in 6 months (on 4/28/2025).       Subjective   Since last visit: weight has increased.  She reports eating healthy. She has no

## 2024-10-28 NOTE — ACP (ADVANCE CARE PLANNING)
Advance Care Planning   Advance Care Planning (ACP) Physician/NP/PA (Provider) Conversation    Date of ACP Conversation: 10/28/24  Persons included in Conversation:  patient  Length of ACP Conversation in minutes: <16 minutes (Non-Billable)    We discussed the patient’s choices for care and treatment preferences in case of a health event that adversely affects decision-making abilities or is life-limiting. We discusses the differences between FULL CODE and DNR and when to consider a change in code status.  The limitations with CPR were reviewed.    Has NO ACP documents-Information provided.  She elects Full Code (Attempt Resuscitation)    The patient has appointed the following active healthcare agents:    Primary Decision Maker: Dominick Cm - Child - [phone]    Secondary Decision Maker: Gibson Cm Jr. - Child - [phone]     Ryan Christian MD

## 2024-10-28 NOTE — ASSESSMENT & PLAN NOTE
Previously at goal (stable), no changes pending review of labs, reviewed this could be contributing to her weight gain but unclear at this time.  She will need medication refilled

## 2024-10-28 NOTE — ASSESSMENT & PLAN NOTE
worsening, above goal, and needs improvement. I have recommended the following interventions: dietary management education, guidance, and counseling and encourage exercise.

## 2024-10-28 NOTE — PATIENT INSTRUCTIONS
Recursos de transporte del área de [place]*  (Llame a [facility] si necesita más recursos)    [facility]  Lo que ofrecen: charley transporte público al área [facility] y partes de los condados de [place] y [place].  Sitio web: http://www.Am[employer]ion/.  Número de teléfono: [phone]  Servicios especializados [facility] (CARE & CARE Plus)  Lo que ofrecen: proporciona acceso al transporte público para personas con discapacidades que pueden no ser razonablemente capaces de utilizar el servicio de autobús de vinod fija de [facility]. Proporciona servicios de origen a destino de acuerdo con las pautas de la [place] para Estadounidenses con Discapacidades (Americans with Disabilities Act, ADA).  Sitio web: http://Am[employer]ion/services/specialized-transportation/care.  Número de teléfono: [phone]  [employer] Ride Connection  Lo que ofrecen: Ride Connection ofrece 2 viajes de blessing y vuelta por mes a citas médicas que no marcella de emergencia (deben calificar).  Sitio web: https://seniorconnections-va.org/services/support-to-stay-home/ride-connections/.  Número de teléfono: [phone]  Requisitos de elegibilidad: personas con discapacidades (de 18 años o más) o adultos mayores (de 60 años o más) que viven en la [facility] o en los condados de [place], [place], [place], [place], [place] y [place].  Sistema de tarifas según escala móvil. Debe patricio aviso con 7 padmini de anticipación para programar los traslados, que están sujetos a disponibilidad.    [employer]  Lo que ofrecen: servicios de transporte basados en donaciones para veteranos, familias necesitadas, personas de edad avanzada y personas con discapacidades.  Sitio web: https://www.[employer].org/  Número de teléfono: [phone]. Avise con dos semanas de anticipación para programar los traslados, que están sujetos a disponibilidad.  Información adicional: ofrece transporte a citas, tiendas de

## 2024-10-29 DIAGNOSIS — E03.4 HYPOTHYROIDISM DUE TO ACQUIRED ATROPHY OF THYROID: ICD-10-CM

## 2024-10-29 LAB
ALBUMIN SERPL-MCNC: 4.1 G/DL (ref 3.5–5)
ALBUMIN/GLOB SERPL: 1.1 (ref 1.1–2.2)
ALP SERPL-CCNC: 120 U/L (ref 45–117)
ALT SERPL-CCNC: 27 U/L (ref 12–78)
ANION GAP SERPL CALC-SCNC: 6 MMOL/L (ref 2–12)
AST SERPL-CCNC: 25 U/L (ref 15–37)
BILIRUB SERPL-MCNC: 0.6 MG/DL (ref 0.2–1)
BUN SERPL-MCNC: 12 MG/DL (ref 6–20)
BUN/CREAT SERPL: 18 (ref 12–20)
CALCIUM SERPL-MCNC: 9.8 MG/DL (ref 8.5–10.1)
CHLORIDE SERPL-SCNC: 103 MMOL/L (ref 97–108)
CHOLEST SERPL-MCNC: 240 MG/DL
CO2 SERPL-SCNC: 31 MMOL/L (ref 21–32)
CREAT SERPL-MCNC: 0.66 MG/DL (ref 0.55–1.02)
ERYTHROCYTE [DISTWIDTH] IN BLOOD BY AUTOMATED COUNT: 12.7 % (ref 11.5–14.5)
GLOBULIN SER CALC-MCNC: 3.8 G/DL (ref 2–4)
GLUCOSE SERPL-MCNC: 95 MG/DL (ref 65–100)
HCT VFR BLD AUTO: 44 % (ref 35–47)
HDLC SERPL-MCNC: 64 MG/DL
HDLC SERPL: 3.8 (ref 0–5)
HGB BLD-MCNC: 14.2 G/DL (ref 11.5–16)
LDLC SERPL CALC-MCNC: 147.8 MG/DL (ref 0–100)
MCH RBC QN AUTO: 31.3 PG (ref 26–34)
MCHC RBC AUTO-ENTMCNC: 32.3 G/DL (ref 30–36.5)
MCV RBC AUTO: 97.1 FL (ref 80–99)
NRBC # BLD: 0 K/UL (ref 0–0.01)
NRBC BLD-RTO: 0 PER 100 WBC
PLATELET # BLD AUTO: 339 K/UL (ref 150–400)
PMV BLD AUTO: 9.5 FL (ref 8.9–12.9)
POTASSIUM SERPL-SCNC: 4.3 MMOL/L (ref 3.5–5.1)
PROT SERPL-MCNC: 7.9 G/DL (ref 6.4–8.2)
RBC # BLD AUTO: 4.53 M/UL (ref 3.8–5.2)
SODIUM SERPL-SCNC: 140 MMOL/L (ref 136–145)
T4 FREE SERPL-MCNC: 1.1 NG/DL (ref 0.8–1.5)
TRIGL SERPL-MCNC: 141 MG/DL
TSH SERPL DL<=0.05 MIU/L-ACNC: 6.3 UIU/ML (ref 0.36–3.74)
VLDLC SERPL CALC-MCNC: 28.2 MG/DL
WBC # BLD AUTO: 8.7 K/UL (ref 3.6–11)

## 2024-10-29 RX ORDER — LEVOTHYROXINE SODIUM 100 UG/1
100 TABLET ORAL DAILY
Qty: 90 TABLET | Refills: 0 | Status: SHIPPED | OUTPATIENT
Start: 2024-10-29

## 2024-10-29 NOTE — RESULT ENCOUNTER NOTE
Results released to patient via [employer].  All labs are stable or at goal for her, except for TSH it is high.  Will increase dose from 112mcg (6 days/wk) to 100mcg daily.

## 2024-12-10 ENCOUNTER — OFFICE VISIT (OUTPATIENT)
Age: 69
End: 2024-12-10
Payer: MEDICARE

## 2024-12-10 VITALS
OXYGEN SATURATION: 98 % | SYSTOLIC BLOOD PRESSURE: 154 MMHG | DIASTOLIC BLOOD PRESSURE: 87 MMHG | BODY MASS INDEX: 31.01 KG/M2 | WEIGHT: 175 LBS | TEMPERATURE: 97.2 F | HEIGHT: 63 IN | RESPIRATION RATE: 16 BRPM | HEART RATE: 57 BPM

## 2024-12-10 DIAGNOSIS — L91.8 SKIN TAG: Primary | ICD-10-CM

## 2024-12-10 PROCEDURE — 11200 RMVL SKIN TAGS UP TO&INC 15: CPT | Performed by: INTERNAL MEDICINE

## 2024-12-10 PROCEDURE — 99213 OFFICE O/P EST LOW 20 MIN: CPT | Performed by: INTERNAL MEDICINE

## 2024-12-10 ASSESSMENT — PATIENT HEALTH QUESTIONNAIRE - PHQ9
SUM OF ALL RESPONSES TO PHQ9 QUESTIONS 1 & 2: 0
SUM OF ALL RESPONSES TO PHQ QUESTIONS 1-9: 0
2. FEELING DOWN, DEPRESSED OR HOPELESS: NOT AT ALL
SUM OF ALL RESPONSES TO PHQ QUESTIONS 1-9: 0
1. LITTLE INTEREST OR PLEASURE IN DOING THINGS: NOT AT ALL

## 2024-12-10 NOTE — PROGRESS NOTES
Nelly Cm is a 69 y.o. female who was seen in clinic today (12/10/2024).    Assessment & Plan:   Below is the assessment and plan developed based on review of pertinent history, physical exam, labs, studies, and medications.    1. Skin tag  Comments:  chronic issue, s/p successful removal. Hemostasis obtained. Will remove bandage tomorrow. Can use abx cream as needed. Expectations reviewed     Return if symptoms worsen or fail to improve.   Subjective/Objective:   Nelly was seen today for Skin Tag     She returns to clinic today for skin tag removal. This is catching on her necklace and at times is painful.  She is not on any blood thinners.      Skin Tag Removal    Date/Time: 12/10/2024 2:29 PM    Performed by: Ryan Christian MD  Authorized by: Ryan Christian MD    Number of Lesions: 1  Lesion 1:     Body area: head/neck    Head/neck location: neck (left sided)    Initial size (mm): 2    Comments:  Procedure explained to patient.      Time out performed:  * Patient was identified by name and date of birth   * Agreement on procedure being performed was verified  * Risks and Benefits explained to the patient  * Procedure site verified and marked as necessary  * Patient was positioned for comfort  * Consent was signed and verified    The area was sterilized with Povidone-Iodine.  The site was anesthetized with ethyl chloride. A #10 scalpel was used to remove the skin tag at its base.  Patient tolerated the procedure well.  Hemostasis was obtained and a bandage placed.          Prior to Admission medications    Medication Sig Start Date End Date Taking? Authorizing Provider   levothyroxine (SYNTHROID) 100 MCG tablet Take 1 tablet by mouth Daily 10/29/24  Yes Ryan Christian MD   losartan-hydroCHLOROthiazide (HYZAAR) 50-12.5 MG per tablet Take 1 tablet by mouth daily 10/28/24  Yes Ryan Christian MD   amLODIPine (NORVASC) 5 MG tablet Take 1 tablet by mouth daily 10/28/24  Yes Gino

## 2024-12-23 ENCOUNTER — TELEPHONE (OUTPATIENT)
Age: 69
End: 2024-12-23

## 2024-12-23 NOTE — TELEPHONE ENCOUNTER
Medication Refill Request    Nellyterrell Cm is requesting a refill of the following medication(s):   Ezetimibe    Please send refill to:     [facility] [id number] - [place], [place] - [address] [phone] - F [phone] [address] [address]  Phone: [phone] Fax: [phone]

## 2024-12-24 DIAGNOSIS — E78.00 PURE HYPERCHOLESTEROLEMIA: ICD-10-CM

## 2024-12-24 RX ORDER — EZETIMIBE 10 MG/1
10 TABLET ORAL DAILY
Qty: 90 TABLET | Refills: 1 | Status: SHIPPED | OUTPATIENT
Start: 2024-12-24

## 2025-01-20 ENCOUNTER — TELEPHONE (OUTPATIENT)
Age: 70
End: 2025-01-20

## 2025-01-20 DIAGNOSIS — E03.4 HYPOTHYROIDISM DUE TO ACQUIRED ATROPHY OF THYROID: ICD-10-CM

## 2025-01-20 RX ORDER — LEVOTHYROXINE SODIUM 100 UG/1
100 TABLET ORAL DAILY
Qty: 90 TABLET | Refills: 0 | Status: SHIPPED | OUTPATIENT
Start: 2025-01-20

## 2025-01-20 NOTE — TELEPHONE ENCOUNTER
Medication Refill Request    Nelly MERYL Soila is requesting a refill of the following medication(s):     Levothyroxine    Please send refill to:     [facility] [id number] - [place], [place] - [address] [phone] - F [phone] [address] [address]  Phone: [phone] Fax: [phone]

## 2025-03-18 ENCOUNTER — OFFICE VISIT (OUTPATIENT)
Age: 70
End: 2025-03-18
Payer: MEDICARE

## 2025-03-18 VITALS
SYSTOLIC BLOOD PRESSURE: 159 MMHG | BODY MASS INDEX: 31.71 KG/M2 | DIASTOLIC BLOOD PRESSURE: 94 MMHG | TEMPERATURE: 97.6 F | OXYGEN SATURATION: 99 % | HEART RATE: 61 BPM | HEIGHT: 63 IN | RESPIRATION RATE: 16 BRPM | WEIGHT: 179 LBS

## 2025-03-18 DIAGNOSIS — R10.9 ABDOMINAL DISCOMFORT IN RIGHT FLANK: Primary | ICD-10-CM

## 2025-03-18 DIAGNOSIS — E03.4 HYPOTHYROIDISM DUE TO ACQUIRED ATROPHY OF THYROID: ICD-10-CM

## 2025-03-18 DIAGNOSIS — I10 WHITE COAT SYNDROME WITH DIAGNOSIS OF HYPERTENSION: ICD-10-CM

## 2025-03-18 DIAGNOSIS — M54.31 RIGHT SCIATIC NERVE PAIN: ICD-10-CM

## 2025-03-18 DIAGNOSIS — J06.9 VIRAL UPPER RESPIRATORY TRACT INFECTION: ICD-10-CM

## 2025-03-18 LAB — TSH SERPL DL<=0.05 MIU/L-ACNC: 8.39 UIU/ML (ref 0.36–3.74)

## 2025-03-18 PROCEDURE — G8417 CALC BMI ABV UP PARAM F/U: HCPCS | Performed by: INTERNAL MEDICINE

## 2025-03-18 PROCEDURE — 3078F DIAST BP <80 MM HG: CPT | Performed by: INTERNAL MEDICINE

## 2025-03-18 PROCEDURE — 3017F COLORECTAL CA SCREEN DOC REV: CPT | Performed by: INTERNAL MEDICINE

## 2025-03-18 PROCEDURE — 1123F ACP DISCUSS/DSCN MKR DOCD: CPT | Performed by: INTERNAL MEDICINE

## 2025-03-18 PROCEDURE — 99214 OFFICE O/P EST MOD 30 MIN: CPT | Performed by: INTERNAL MEDICINE

## 2025-03-18 PROCEDURE — 1159F MED LIST DOCD IN RCRD: CPT | Performed by: INTERNAL MEDICINE

## 2025-03-18 PROCEDURE — G8399 PT W/DXA RESULTS DOCUMENT: HCPCS | Performed by: INTERNAL MEDICINE

## 2025-03-18 PROCEDURE — 1036F TOBACCO NON-USER: CPT | Performed by: INTERNAL MEDICINE

## 2025-03-18 PROCEDURE — 1090F PRES/ABSN URINE INCON ASSESS: CPT | Performed by: INTERNAL MEDICINE

## 2025-03-18 PROCEDURE — 3077F SYST BP >= 140 MM HG: CPT | Performed by: INTERNAL MEDICINE

## 2025-03-18 PROCEDURE — 1126F AMNT PAIN NOTED NONE PRSNT: CPT | Performed by: INTERNAL MEDICINE

## 2025-03-18 PROCEDURE — G8427 DOCREV CUR MEDS BY ELIG CLIN: HCPCS | Performed by: INTERNAL MEDICINE

## 2025-03-18 PROCEDURE — 1160F RVW MEDS BY RX/DR IN RCRD: CPT | Performed by: INTERNAL MEDICINE

## 2025-03-18 RX ORDER — DICYCLOMINE HYDROCHLORIDE 10 MG/1
10 CAPSULE ORAL 4 TIMES DAILY PRN
Qty: 20 CAPSULE | Refills: 0 | Status: SHIPPED | OUTPATIENT
Start: 2025-03-18

## 2025-03-18 SDOH — ECONOMIC STABILITY: FOOD INSECURITY: WITHIN THE PAST 12 MONTHS, YOU WORRIED THAT YOUR FOOD WOULD RUN OUT BEFORE YOU GOT MONEY TO BUY MORE.: NEVER TRUE

## 2025-03-18 SDOH — ECONOMIC STABILITY: FOOD INSECURITY: WITHIN THE PAST 12 MONTHS, THE FOOD YOU BOUGHT JUST DIDN'T LAST AND YOU DIDN'T HAVE MONEY TO GET MORE.: NEVER TRUE

## 2025-03-18 ASSESSMENT — PATIENT HEALTH QUESTIONNAIRE - PHQ9
2. FEELING DOWN, DEPRESSED OR HOPELESS: NOT AT ALL
SUM OF ALL RESPONSES TO PHQ QUESTIONS 1-9: 0
SUM OF ALL RESPONSES TO PHQ QUESTIONS 1-9: 0
1. LITTLE INTEREST OR PLEASURE IN DOING THINGS: NOT AT ALL
SUM OF ALL RESPONSES TO PHQ QUESTIONS 1-9: 0
SUM OF ALL RESPONSES TO PHQ QUESTIONS 1-9: 0

## 2025-03-18 NOTE — PROGRESS NOTES
negative bilaterally   Lymphadenopathy:      Cervical: No cervical adenopathy.        Vitals:    03/18/25 1426 03/18/25 1438   BP: (!) 170/77 (!) 159/94   Pulse: 64 61   Resp: 16    Temp: 97.6 °F (36.4 °C)    TempSrc: Temporal    SpO2: 99%    Weight: 81.2 kg (179 lb)    Height: 1.6 m (5' 3\")       Body mass index is 31.71 kg/m².     Prior to Admission medications    Medication Sig Start Date End Date Taking? Authorizing Provider   levothyroxine (SYNTHROID) 100 MCG tablet Take 1 tablet by mouth Daily 1/20/25  Yes Ryan Christian MD   ezetimibe (ZETIA) 10 MG tablet Take 1 tablet by mouth daily 12/24/24  Yes Ryan Christian MD   losartan-hydroCHLOROthiazide (HYZAAR) 50-12.5 MG per tablet Take 1 tablet by mouth daily 10/28/24  Yes Ryan Christian MD   amLODIPine (NORVASC) 5 MG tablet Take 1 tablet by mouth daily 10/28/24  Yes Ryan Christian MD   metoprolol (LOPRESSOR) 100 MG tablet TAKE 1 TABLET BY MOUTH 2 TIMES A DAY 9/29/24  Yes Ryan Christian MD   famotidine (PEPCID) 20 MG tablet Take 1 tablet by mouth 2 times daily 4/23/24  Yes Ryan Christian MD   Multiple Vitamins-Minerals (PRESERVISION AREDS 2 PO) Take by mouth daily   Yes ProviderGiacomo MD   Fexofenadine HCl (ALLEGRA PO) Take by mouth daily   Yes Giacomo Hendricks MD   Calcium-Vitamin D 600-5 MG-MCG TABS Take 1 tablet by mouth every other day   Yes Giacomo Hendricks MD   acetaminophen (TYLENOL) 325 MG tablet Take by mouth every 4 hours as needed   Yes Automatic Reconciliation, Ar   cyanocobalamin 1000 MCG tablet Take by mouth every other day   Yes Automatic Reconciliation, Ar   diclofenac sodium (VOLTAREN) 1 % GEL Apply topically daily   Yes Automatic Reconciliation, Ar         The patient (or guardian, if applicable) and other individuals in attendance with the patient were advised that Artificial Intelligence will be utilized during this visit to record, process the conversation to generate a clinical note,

## 2025-03-19 ENCOUNTER — RESULTS FOLLOW-UP (OUTPATIENT)
Age: 70
End: 2025-03-19

## 2025-03-19 DIAGNOSIS — E03.4 HYPOTHYROIDISM DUE TO ACQUIRED ATROPHY OF THYROID: ICD-10-CM

## 2025-03-19 RX ORDER — LEVOTHYROXINE SODIUM 112 UG/1
112 TABLET ORAL DAILY
Qty: 90 TABLET | Refills: 0 | Status: SHIPPED | OUTPATIENT
Start: 2025-03-19

## 2025-03-19 NOTE — RESULT ENCOUNTER NOTE
Results released to patient via [employer].  TSH is worse. Will increase dose from 100mcg to 112mcg.  She has f/u on 5/5 and will repeat labs then.

## 2025-03-28 ENCOUNTER — TELEPHONE (OUTPATIENT)
Age: 70
End: 2025-03-28

## 2025-03-28 DIAGNOSIS — I10 WHITE COAT SYNDROME WITH DIAGNOSIS OF HYPERTENSION: ICD-10-CM

## 2025-03-28 RX ORDER — METOPROLOL TARTRATE 100 MG/1
TABLET ORAL
Qty: 180 TABLET | Refills: 0 | Status: SHIPPED | OUTPATIENT
Start: 2025-03-28

## 2025-03-28 NOTE — TELEPHONE ENCOUNTER
Medication Refill Request    Nelly Cm is requesting a refill of the following medication(s):     metoprolol (LOPRESSOR) 100 MG tablet   09/29/24  --  Ryan Christian MD     TAKE 1 TABLET BY MOUTH 2 TIMES A DAY       Please send refill to:     [facility] [id number] [place], [place] - [address] [phone] -  [phone] [address] [address]  Phone: [phone] Fax: [phone]

## 2025-04-16 DIAGNOSIS — K21.9 GASTROESOPHAGEAL REFLUX DISEASE WITHOUT ESOPHAGITIS: ICD-10-CM

## 2025-04-17 RX ORDER — FAMOTIDINE 20 MG/1
20 TABLET, FILM COATED ORAL 2 TIMES DAILY
Qty: 180 TABLET | Refills: 3 | Status: SHIPPED | OUTPATIENT
Start: 2025-04-17

## 2025-04-25 DIAGNOSIS — I10 WHITE COAT SYNDROME WITH DIAGNOSIS OF HYPERTENSION: ICD-10-CM

## 2025-04-25 RX ORDER — AMLODIPINE BESYLATE 5 MG/1
5 TABLET ORAL DAILY
Qty: 30 TABLET | Refills: 0 | Status: SHIPPED | OUTPATIENT
Start: 2025-04-25

## 2025-04-28 ENCOUNTER — TELEPHONE (OUTPATIENT)
Age: 70
End: 2025-04-28

## 2025-04-28 DIAGNOSIS — I10 WHITE COAT SYNDROME WITH DIAGNOSIS OF HYPERTENSION: ICD-10-CM

## 2025-04-28 RX ORDER — LOSARTAN POTASSIUM AND HYDROCHLOROTHIAZIDE 12.5; 5 MG/1; MG/1
1 TABLET ORAL DAILY
Qty: 30 TABLET | Refills: 0 | Status: SHIPPED | OUTPATIENT
Start: 2025-04-28

## 2025-04-28 NOTE — TELEPHONE ENCOUNTER
Medication Refill Request    Nelly Cm is requesting a refill of the following medication(s):     losartan-hydroCHLOROthiazide (HYZAAR) 50-12.5 MG per tablet     Please send refill to:     [facility] [id number] - [place], [place] - [address] [phone] - F [phone] [address] [address]  Phone: [phone] Fax: [phone]

## 2025-05-05 ENCOUNTER — OFFICE VISIT (OUTPATIENT)
Age: 70
End: 2025-05-05
Payer: MEDICARE

## 2025-05-05 VITALS
OXYGEN SATURATION: 99 % | DIASTOLIC BLOOD PRESSURE: 90 MMHG | SYSTOLIC BLOOD PRESSURE: 135 MMHG | HEIGHT: 63 IN | HEART RATE: 68 BPM | WEIGHT: 172.4 LBS | RESPIRATION RATE: 16 BRPM | BODY MASS INDEX: 30.55 KG/M2 | TEMPERATURE: 98.1 F

## 2025-05-05 DIAGNOSIS — I10 WHITE COAT SYNDROME WITH DIAGNOSIS OF HYPERTENSION: ICD-10-CM

## 2025-05-05 DIAGNOSIS — I10 WHITE COAT SYNDROME WITH DIAGNOSIS OF HYPERTENSION: Primary | ICD-10-CM

## 2025-05-05 DIAGNOSIS — E78.00 PURE HYPERCHOLESTEROLEMIA: ICD-10-CM

## 2025-05-05 DIAGNOSIS — K21.9 GASTROESOPHAGEAL REFLUX DISEASE WITHOUT ESOPHAGITIS: ICD-10-CM

## 2025-05-05 DIAGNOSIS — E03.4 HYPOTHYROIDISM DUE TO ACQUIRED ATROPHY OF THYROID: ICD-10-CM

## 2025-05-05 PROCEDURE — 1090F PRES/ABSN URINE INCON ASSESS: CPT | Performed by: INTERNAL MEDICINE

## 2025-05-05 PROCEDURE — 1160F RVW MEDS BY RX/DR IN RCRD: CPT | Performed by: INTERNAL MEDICINE

## 2025-05-05 PROCEDURE — 99214 OFFICE O/P EST MOD 30 MIN: CPT | Performed by: INTERNAL MEDICINE

## 2025-05-05 PROCEDURE — G8427 DOCREV CUR MEDS BY ELIG CLIN: HCPCS | Performed by: INTERNAL MEDICINE

## 2025-05-05 PROCEDURE — 1126F AMNT PAIN NOTED NONE PRSNT: CPT | Performed by: INTERNAL MEDICINE

## 2025-05-05 PROCEDURE — G8417 CALC BMI ABV UP PARAM F/U: HCPCS | Performed by: INTERNAL MEDICINE

## 2025-05-05 PROCEDURE — 3080F DIAST BP >= 90 MM HG: CPT | Performed by: INTERNAL MEDICINE

## 2025-05-05 PROCEDURE — 1123F ACP DISCUSS/DSCN MKR DOCD: CPT | Performed by: INTERNAL MEDICINE

## 2025-05-05 PROCEDURE — G8399 PT W/DXA RESULTS DOCUMENT: HCPCS | Performed by: INTERNAL MEDICINE

## 2025-05-05 PROCEDURE — 3017F COLORECTAL CA SCREEN DOC REV: CPT | Performed by: INTERNAL MEDICINE

## 2025-05-05 PROCEDURE — 1036F TOBACCO NON-USER: CPT | Performed by: INTERNAL MEDICINE

## 2025-05-05 PROCEDURE — 3075F SYST BP GE 130 - 139MM HG: CPT | Performed by: INTERNAL MEDICINE

## 2025-05-05 PROCEDURE — 1159F MED LIST DOCD IN RCRD: CPT | Performed by: INTERNAL MEDICINE

## 2025-05-05 RX ORDER — LOSARTAN POTASSIUM AND HYDROCHLOROTHIAZIDE 12.5; 5 MG/1; MG/1
1 TABLET ORAL DAILY
Qty: 90 TABLET | Refills: 3 | Status: SHIPPED | OUTPATIENT
Start: 2025-05-05

## 2025-05-05 RX ORDER — AMLODIPINE BESYLATE 5 MG/1
5 TABLET ORAL DAILY
Qty: 90 TABLET | Refills: 3 | Status: SHIPPED | OUTPATIENT
Start: 2025-05-05

## 2025-05-05 ASSESSMENT — ENCOUNTER SYMPTOMS
SHORTNESS OF BREATH: 0
NAUSEA: 0
BLOOD IN STOOL: 0
DIARRHEA: 0
VOMITING: 0
COUGH: 0
ABDOMINAL PAIN: 0
CONSTIPATION: 0

## 2025-05-05 NOTE — PROGRESS NOTES
Nelly Cm is a 69 y.o. female who was seen in clinic today (5/5/2025).     Assessment & Plan:   Below is the assessment and plan developed based on review of pertinent history, physical exam, labs, studies, and medications.    1. White coat syndrome with diagnosis of hypertension  Assessment & Plan:  At goal at home, elevated in the office, she just took her medications within the last hour, no changes pending review of labs.   Orders:  -     amLODIPine (NORVASC) 5 MG tablet; Take 1 tablet by mouth daily, Disp-90 tablet, R-3Normal  -     losartan-hydroCHLOROthiazide (HYZAAR) 50-12.5 MG per tablet; Take 1 tablet by mouth daily, Disp-90 tablet, R-3Normal  -     Comprehensive Metabolic Panel; Future  2. Pure hypercholesterolemia  Assessment & Plan:   Chronic, at goal (stable), continue current plan pending work up below  Orders:  -     Comprehensive Metabolic Panel; Future  3. Hypothyroidism due to acquired atrophy of thyroid  Assessment & Plan:  Asymptomatic, not at goal, due for labs since dose adjustment   Orders:  -     TSH; Future  4. Gastroesophageal reflux disease without esophagitis  Assessment & Plan:  Improved and well controlled, continue current treatment         Return in about 6 months (around 11/5/2025) for FULL PHYSICAL.      Subjective/Objective:   Nelly was seen today for Follow-up     Since last visit: weight is stable.  She reports previous abdominal has resolved. She was seen at [facility] on 4/25 for R hip pain, this has also resolved.     Cardiovascular Review  The patient has hypertension and hyperlipidemia.  Since last visit no changes.  She reports taking medications as instructed, no medication side effects noted, home BP monitoring is done sporadically. This morning was 125/79.  The previous two days it has been 147/44 and 142/86.  She generally follows a low fat low cholesterol diet, generally follows a low sodium diet, but recently she has had issues due to cost of food. Exercises

## 2025-05-05 NOTE — ASSESSMENT & PLAN NOTE
At goal at home, elevated in the office, she just took her medications within the last hour, no changes pending review of labs.

## 2025-05-06 LAB
ALBUMIN SERPL-MCNC: 3.8 G/DL (ref 3.5–5)
ALBUMIN/GLOB SERPL: 1 (ref 1.1–2.2)
ALP SERPL-CCNC: 121 U/L (ref 45–117)
ALT SERPL-CCNC: 33 U/L (ref 12–78)
ANION GAP SERPL CALC-SCNC: 4 MMOL/L (ref 2–12)
AST SERPL-CCNC: 25 U/L (ref 15–37)
BILIRUB SERPL-MCNC: 0.3 MG/DL (ref 0.2–1)
BUN SERPL-MCNC: 11 MG/DL (ref 6–20)
BUN/CREAT SERPL: 19 (ref 12–20)
CALCIUM SERPL-MCNC: 9.5 MG/DL (ref 8.5–10.1)
CHLORIDE SERPL-SCNC: 103 MMOL/L (ref 97–108)
CO2 SERPL-SCNC: 29 MMOL/L (ref 21–32)
CREAT SERPL-MCNC: 0.58 MG/DL (ref 0.55–1.02)
GLOBULIN SER CALC-MCNC: 3.7 G/DL (ref 2–4)
GLUCOSE SERPL-MCNC: 85 MG/DL (ref 65–100)
POTASSIUM SERPL-SCNC: 4.1 MMOL/L (ref 3.5–5.1)
PROT SERPL-MCNC: 7.5 G/DL (ref 6.4–8.2)
SODIUM SERPL-SCNC: 136 MMOL/L (ref 136–145)
TSH SERPL DL<=0.05 MIU/L-ACNC: 3.36 UIU/ML (ref 0.36–3.74)

## 2025-05-07 ENCOUNTER — RESULTS FOLLOW-UP (OUTPATIENT)
Age: 70
End: 2025-05-07

## 2025-05-07 NOTE — RESULT ENCOUNTER NOTE
Results released to patient via [employer].  All labs are stable or at goal for her. TSH improved, at goal, no changes.

## 2025-05-29 ENCOUNTER — TELEPHONE (OUTPATIENT)
Age: 70
End: 2025-05-29

## 2025-05-29 DIAGNOSIS — E03.4 HYPOTHYROIDISM DUE TO ACQUIRED ATROPHY OF THYROID: ICD-10-CM

## 2025-05-29 RX ORDER — LEVOTHYROXINE SODIUM 112 UG/1
112 TABLET ORAL DAILY
Qty: 90 TABLET | Refills: 1 | Status: SHIPPED | OUTPATIENT
Start: 2025-05-29

## 2025-05-29 NOTE — TELEPHONE ENCOUNTER
Medication Refill Request    Nellyterrell Cm is requesting a refill of the following medication(s):     levothyroxine (SYNTHROID) 112 MCG tablet     Please send refill to:     [facility] [id number] - [place], [place] - [address] [phone] - F [phone] [address] [address]  Phone: [phone] Fax: [phone]

## 2025-06-23 DIAGNOSIS — E78.00 PURE HYPERCHOLESTEROLEMIA: ICD-10-CM

## 2025-06-23 RX ORDER — EZETIMIBE 10 MG/1
10 TABLET ORAL DAILY
Qty: 90 TABLET | Refills: 1 | Status: SHIPPED | OUTPATIENT
Start: 2025-06-23

## 2025-06-25 ENCOUNTER — TRANSCRIBE ORDERS (OUTPATIENT)
Facility: HOSPITAL | Age: 70
End: 2025-06-25

## 2025-06-25 DIAGNOSIS — Z12.31 VISIT FOR SCREENING MAMMOGRAM: Primary | ICD-10-CM

## 2025-06-26 ENCOUNTER — TELEPHONE (OUTPATIENT)
Age: 70
End: 2025-06-26

## 2025-06-26 DIAGNOSIS — I10 WHITE COAT SYNDROME WITH DIAGNOSIS OF HYPERTENSION: ICD-10-CM

## 2025-06-26 RX ORDER — METOPROLOL TARTRATE 100 MG/1
TABLET ORAL
Qty: 180 TABLET | Refills: 1 | Status: SHIPPED | OUTPATIENT
Start: 2025-06-26

## 2025-06-26 NOTE — TELEPHONE ENCOUNTER
Medication Refill Request    Nelly Cm is requesting a refill of the following medication(s):      metoprolol (LOPRESSOR) 100 MG tablet   03/28/25  --  Ryan Christian MD    TAKE 1 TABLET BY MOUTH 2 TIMES A DAY    Notes:  Patient has appt 5/05/2025 for additional refills     Please send refill to:     [facility] [id number] [facility] [place] [address] [place] [address] [phone] -  [phone] [address] [facility] [address]  Phone: [phone] Fax: [phone]

## 2025-07-02 ENCOUNTER — HOSPITAL ENCOUNTER (OUTPATIENT)
Facility: HOSPITAL | Age: 70
Discharge: HOME | End: 2025-07-05
Attending: INTERNAL MEDICINE
Payer: MEDICARE

## 2025-07-02 ENCOUNTER — RESULTS FOLLOW-UP (OUTPATIENT)
Age: 70
End: 2025-07-02

## 2025-07-02 VITALS — HEIGHT: 62 IN | BODY MASS INDEX: 31.65 KG/M2 | WEIGHT: 172 LBS

## 2025-07-02 DIAGNOSIS — Z12.31 VISIT FOR SCREENING MAMMOGRAM: ICD-10-CM

## 2025-07-02 PROCEDURE — 77063 BREAST TOMOSYNTHESIS BI: CPT

## (undated) DEVICE — ANGIOGRAPHIC CATHETER: Brand: IMPULSE™

## (undated) DEVICE — KIT HND CTRL 3 W STPCOCK ROT END 54IN PREM HI PRSS TBNG AT

## (undated) DEVICE — GLIDESHEATH SLENDER STAINLESS STEEL KIT: Brand: GLIDESHEATH SLENDER

## (undated) DEVICE — PACK PROCEDURE SURG HRT CATH

## (undated) DEVICE — KIT MFLD ISOLATN NACL CNTRST PRT TBNG SPIK W/ PRSS TRNSDUC

## (undated) DEVICE — SPECIAL PROCEDURE DRAPE 32" X 34": Brand: SPECIAL PROCEDURE DRAPE

## (undated) DEVICE — TR BAND RADIAL ARTERY COMPRESSION DEVICE: Brand: TR BAND

## (undated) DEVICE — SPLINT WR POS F/ARTERIAL ACC -- BX/10

## (undated) DEVICE — ANGIOGRAPHY KIT

## (undated) DEVICE — KIT MED IMAG CNTRST AGNT W/ IOPAMIDOL REUSE